# Patient Record
Sex: FEMALE | Race: WHITE | NOT HISPANIC OR LATINO | Employment: FULL TIME | URBAN - METROPOLITAN AREA
[De-identification: names, ages, dates, MRNs, and addresses within clinical notes are randomized per-mention and may not be internally consistent; named-entity substitution may affect disease eponyms.]

---

## 2017-02-16 ENCOUNTER — MYC MEDICAL ADVICE (OUTPATIENT)
Dept: FAMILY MEDICINE | Facility: CLINIC | Age: 47
End: 2017-02-16

## 2017-05-05 DIAGNOSIS — F32.2 MAJOR DEPRESSIVE DISORDER, SINGLE EPISODE, SEVERE WITHOUT PSYCHOTIC FEATURES (H): ICD-10-CM

## 2017-05-08 NOTE — TELEPHONE ENCOUNTER
Routing refill request to provider for review/approval because:  Patient needs to be seen because it has been more than 1 year since last office visit.  PHQ 9 completed on 01/22/2016, score of 0.    Magda Herrera RN

## 2017-05-09 RX ORDER — VENLAFAXINE HYDROCHLORIDE 150 MG/1
150 CAPSULE, EXTENDED RELEASE ORAL DAILY
Qty: 90 CAPSULE | Refills: 0 | Status: SHIPPED | OUTPATIENT
Start: 2017-05-09 | End: 2017-10-04

## 2017-07-29 ENCOUNTER — HEALTH MAINTENANCE LETTER (OUTPATIENT)
Age: 47
End: 2017-07-29

## 2017-08-05 DIAGNOSIS — F32.2 MAJOR DEPRESSIVE DISORDER, SINGLE EPISODE, SEVERE WITHOUT PSYCHOTIC FEATURES (H): ICD-10-CM

## 2017-08-07 NOTE — TELEPHONE ENCOUNTER
venlafaxine (EFFEXOR-XR) 150 MG 24 hr capsule    Last Written Prescription Date: 5/9/17  Last Fill Quantity: 90, # refills: 0  Last Office Visit with G, P or [facility] prescribing provider: 1/22/16   Next 5 appointments (look out 90 days)     Oct 04, 2017  9:30 AM CDT   [facility]    [address] [place] [id number] [phone]                   BP Readings from Last 3 Encounters:   01/22/16 108/72   05/04/15 124/56   10/24/14 102/64     Pulse: (for Fetzima)  Creatinine   Date Value Ref Range Status   04/07/2015 0.88 0.52 - 1.04 mg/dL Final   ]    Last PHQ-9 score on record=   PHQ-9 SCORE 1/22/2016   Total Score -   Total Score 0

## 2017-08-08 NOTE — TELEPHONE ENCOUNTER
Routing refill request to provider for review/approval because:  Patient needs to be seen because it has been more than 1 year since last office visit.  LOV: 1/22/16  LEONIDAS Gresham

## 2017-08-09 RX ORDER — VENLAFAXINE HYDROCHLORIDE 150 MG/1
CAPSULE, EXTENDED RELEASE ORAL
Qty: 90 CAPSULE | Refills: 0 | Status: SHIPPED | OUTPATIENT
Start: 2017-08-09 | End: 2017-10-04

## 2017-10-04 ENCOUNTER — OFFICE VISIT (OUTPATIENT)
Dept: FAMILY MEDICINE | Facility: CLINIC | Age: 47
End: 2017-10-04
Payer: COMMERCIAL

## 2017-10-04 VITALS
TEMPERATURE: 97 F | DIASTOLIC BLOOD PRESSURE: 78 MMHG | HEART RATE: 78 BPM | HEIGHT: 72 IN | BODY MASS INDEX: 20.83 KG/M2 | OXYGEN SATURATION: 100 % | SYSTOLIC BLOOD PRESSURE: 102 MMHG | WEIGHT: 153.8 LBS

## 2017-10-04 DIAGNOSIS — G47.00 PERSISTENT INSOMNIA: ICD-10-CM

## 2017-10-04 DIAGNOSIS — T78.05XD ANAPHYLACTIC REACTION DUE TO TREE NUTS AND SEEDS, SUBSEQUENT ENCOUNTER: ICD-10-CM

## 2017-10-04 DIAGNOSIS — M54.41 ACUTE MIDLINE LOW BACK PAIN WITH RIGHT-SIDED SCIATICA: ICD-10-CM

## 2017-10-04 DIAGNOSIS — F32.2 MAJOR DEPRESSIVE DISORDER, SINGLE EPISODE, SEVERE WITHOUT PSYCHOTIC FEATURES (H): Primary | ICD-10-CM

## 2017-10-04 PROCEDURE — 99214 OFFICE O/P EST MOD 30 MIN: CPT | Performed by: FAMILY MEDICINE

## 2017-10-04 RX ORDER — METHYLPREDNISOLONE 4 MG/1
TABLET ORAL
Qty: 21 TABLET | Refills: 0 | Status: SHIPPED | OUTPATIENT
Start: 2017-10-04 | End: 2017-11-26

## 2017-10-04 RX ORDER — VENLAFAXINE HYDROCHLORIDE 150 MG/1
150 CAPSULE, EXTENDED RELEASE ORAL DAILY
Qty: 93 CAPSULE | Refills: 3 | Status: SHIPPED | OUTPATIENT
Start: 2017-10-04 | End: 2018-12-31

## 2017-10-04 RX ORDER — EPINEPHRINE 0.3 MG/.3ML
0.3 INJECTION SUBCUTANEOUS PRN
Qty: 0.6 ML | Refills: 1 | Status: SHIPPED | OUTPATIENT
Start: 2017-10-04 | End: 2020-05-05

## 2017-10-04 RX ORDER — TRAZODONE HYDROCHLORIDE 100 MG/1
50-100 TABLET ORAL AT BEDTIME
Qty: 90 TABLET | Refills: 3 | Status: SHIPPED | OUTPATIENT
Start: 2017-10-04 | End: 2017-10-06

## 2017-10-04 ASSESSMENT — PAIN SCALES - GENERAL: PAINLEVEL: MODERATE PAIN (5)

## 2017-10-04 ASSESSMENT — PATIENT HEALTH QUESTIONNAIRE - PHQ9: SUM OF ALL RESPONSES TO PHQ QUESTIONS 1-9: 1

## 2017-10-04 NOTE — NURSING NOTE
Chief Complaint   Patient presents with     Recheck Medication       Initial /78  Pulse 78  Temp 97  F (36.1  C) (Temporal)  Ht 6' (1.829 m)  Wt 153 lb 12.8 oz (69.8 kg)  LMP 02/21/2007  SpO2 100%  BMI 20.86 kg/m2 Estimated body mass index is 20.86 kg/(m^2) as calculated from the following:    Height as of this encounter: 6' (1.829 m).    Weight as of this encounter: 153 lb 12.8 oz (69.8 kg).  Medication Reconciliation: complete   Jessica Espana, CMA

## 2017-10-04 NOTE — MR AVS SNAPSHOT
After Visit Summary   10/4/2017    Jennifer Hoffman    MRN: [id number]           Patient Information     Date Of Birth          1970        Visit Information        Provider Department      10/4/2017 9:30 AM Marni Montemayor MD [facility]        Today's Diagnoses     Acute midline low back pain with right-sided sciatica    -  1    Major depressive disorder, single episode, severe without psychotic features (H)        Persistent insomnia        Anaphylactic reaction due to tree nuts and seeds, subsequent encounter        Generalized pain        Inflammation and stiffening of spine (H)           Follow-ups after your visit        Who to contact     If you have questions or need follow up information about today's clinic visit or your schedule please contact [facility] directly at [phone].  Normal or non-critical lab and imaging results will be communicated to you by MyChart, letter or phone within 4 business days after the clinic has received the results. If you do not hear from us within 7 days, please contact the clinic through [facility] or phone. If you have a critical or abnormal lab result, we will notify you by phone as soon as possible.  Submit refill requests through SezWho or call your pharmacy and they will forward the refill request to us. Please allow 3 business days for your refill to be completed.          Additional Information About Your Visit        MyChart Information     SezWho gives you secure access to your electronic health record. If you see a primary care provider, you can also send messages to your care team and make appointments. If you have questions, please call your primary care clinic.  If you do not have a primary care provider, please call [phone] and they will assist you.        Care EveryWhere ID     This is your Care EveryWhere ID. This could be used by other organizations to access your [facility]  records  [id number]        Your Vitals Were     Pulse Temperature Height Last Period Pulse Oximetry BMI (Body Mass Index)    78 97  F (36.1  C) (Temporal) 6' (1.829 m) 02/21/2007 100% 20.86 kg/m2       Blood Pressure from Last 3 Encounters:   10/04/17 102/78   01/22/16 108/72   05/04/15 124/56    Weight from Last 3 Encounters:   10/04/17 153 lb 12.8 oz (69.8 kg)   01/22/16 139 lb (63 kg)   05/04/15 139 lb 12.8 oz (63.4 kg)              We Performed the Following     DEPRESSION ACTION PLAN (DAP)          Today's Medication Changes          These changes are accurate as of: 10/4/17 10:00 AM.  If you have any questions, ask your nurse or doctor.               Start taking these medicines.        Dose/Directions    methylPREDNISolone 4 MG tablet   Commonly known as:  MEDROL   Used for:  Acute midline low back pain with right-sided sciatica   Started by:  Marni Montemayor MD        6 tabs po on day 1 then 5 tabs po on day 2 then 4 tabs po on day 3 then 3 tabs po on day 4 then 2 tabs po on day 5 then 1 tab on day 6.   Quantity:  21 tablet   Refills:  0         These medicines have changed or have updated prescriptions.        Dose/Directions    * EPINEPHrine 0.3 MG/0.3ML injection   Commonly known as:  EPIPEN   This may have changed:  Another medication with the same name was added. Make sure you understand how and when to take each.   Used for:  Anaphylactic reaction due to tree nuts and seeds, Generalized pain, Inflammation and stiffening of spine (H)   Changed by:  Jass Riddle MD        Dose:  0.3 mg   Inject 0.3 mLs into the muscle once as needed for anaphylaxis for 1 dose.   Quantity:  3 each   Refills:  1       * EPINEPHrine 0.3 MG/0.3ML injection 2-pack   Commonly known as:  EPIPEN/ADRENACLICK/or ANY BX GENERIC EQUIV   This may have changed:  You were already taking a medication with the same name, and this prescription was added. Make sure you understand how and when to take each.   Used for:   Anaphylactic reaction due to tree nuts and seeds, subsequent encounter   Changed by:  Marni Montemayor MD        Dose:  0.3 mg   Inject 0.3 mLs (0.3 mg) into the muscle as needed for anaphylaxis   Quantity:  0.6 mL   Refills:  1       traZODone 100 MG tablet   Commonly known as:  DESYREL   This may have changed:    - how much to take  - how to take this  - when to take this   Used for:  Major depressive disorder, single episode, severe without psychotic features (H), Persistent insomnia   Changed by:  Marni Montemayor MD        Dose:   mg   Take 0.5-1 tablets ( mg) by mouth At Bedtime Take 1-2 tablets at bedtime as needed for sleep   Quantity:  90 tablet   Refills:  3       * Notice:  This list has 2 medication(s) that are the same as other medications prescribed for you. Read the directions carefully, and ask your doctor or other care provider to review them with you.         Where to get your medicines      These medications were sent to [facility] [address] AT [address] to [address] [address]     Phone:  [phone]     traZODone 100 MG tablet    venlafaxine 150 MG 24 hr capsule         These medications were sent to [facility] - [place] - [address]   [address] [place] [address]     Phone:  [phone]     EPINEPHrine 0.3 MG/0.3ML injection 2-pack    methylPREDNISolone 4 MG tablet                Primary Care Provider Office Phone # Fax #    Marni Montemayor [phone]       [address] [place] [address]        Equal Access to Services     [facility] AH: Hadii justin Johnson, waaxda luqadaha, qaybta kaalmada adeegyada, rudi lozada. So [facility] [phone].    ATENCIÓN: Si habla español, tiene a gorman disposición servicios gratuitos de asistencia lingüística. Llame al  [phone].    We comply with applicable federal civil rights laws and [place] laws. We do not discriminate on the basis of race, color, national origin, age, disability, sex, sexual orientation, or gender identity.            Thank you!     Thank you for choosing [facility]  for your care. Our goal is always to provide you with excellent care. Hearing back from our patients is one way we can continue to improve our services. Please take a few minutes to complete the written survey that you may receive in the mail after your visit with us. Thank you!             Your Updated Medication List - Protect others around you: Learn how to safely use, store and throw away your medicines at www.disposemymeds.org.          This list is accurate as of: 10/4/17 10:00 AM.  Always use your most recent med list.                   Brand Name Dispense Instructions for use Diagnosis    ALLEGRA PO           * EPINEPHrine 0.3 MG/0.3ML injection    EPIPEN    3 each    Inject 0.3 mLs into the muscle once as needed for anaphylaxis for 1 dose.    Anaphylactic reaction due to tree nuts and seeds, Generalized pain, Inflammation and stiffening of spine (H)       * EPINEPHrine 0.3 MG/0.3ML injection 2-pack    EPIPEN/ADRENACLICK/or ANY BX GENERIC EQUIV    0.6 mL    Inject 0.3 mLs (0.3 mg) into the muscle as needed for anaphylaxis    Anaphylactic reaction due to tree nuts and seeds, subsequent encounter       methylPREDNISolone 4 MG tablet    MEDROL    21 tablet    6 tabs po on day 1 then 5 tabs po on day 2 then 4 tabs po on day 3 then 3 tabs po on day 4 then 2 tabs po on day 5 then 1 tab on day 6.    Acute midline low back pain with right-sided sciatica       traZODone 100 MG tablet    DESYREL    90 tablet    Take 0.5-1 tablets ( mg) by mouth At Bedtime Take 1-2 tablets at bedtime as needed for sleep    Major depressive disorder, single episode, severe without psychotic features (H), Persistent insomnia        venlafaxine 150 MG 24 hr capsule    EFFEXOR-XR    93 capsule    Take 1 capsule (150 mg) by mouth daily    Major depressive disorder, single episode, severe without psychotic features (H)       * Notice:  This list has 2 medication(s) that are the same as other medications prescribed for you. Read the directions carefully, and ask your doctor or other care provider to review them with you.

## 2017-10-04 NOTE — PROGRESS NOTES
"  SUBJECTIVE:   Jennifer Hoffman is a 47 year old female who presents to clinic today for the following health issues:    (F32.2) Major depressive disorder, single episode, severe without psychotic features (H)   Comment: Patient has a history of Depression treated with Effexor 150 mg qd. She says that her symptoms are stable on this medication.  Patient denies any anxiety, suicidal ideation, or other associated symptoms. No recent significant life event or substance abuse.     PHQ-9 SCORE 5/4/2015 1/22/2016 10/4/2017   Total Score 9 - -   Total Score - 0 1     (M54.41) Acute midline low back pain with right-sided sciatica  Comment: Patient has a history of chronic low back pain followed by [facility]. She states that this has been in good control up until recently. 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 Since then, she has been experiencing \"intense\" pain in her mid lumbar region, radiating down her right leg. Patient has been working on stretches for this with some relief.     (G47.00) Persistent insomnia  Comment: Patient has a history of persistent insomnia treated with Trazodone 50 mg qd. She says that she was previously taking Trazodone 100 mg but she has decreased to 50 mg qd with good relief. She reports that she is sleeping well.     (T78.05XD) Anaphylactic reaction due to tree nuts and seeds, subsequent encounter  Comment: Patient has a peanut allergy and has a current EpiPen on hand. No recent exposure to peanuts. No recent reactions.       Problem list and histories reviewed & adjusted, as indicated.  Additional history: as documented    Patient Active Problem List   Diagnosis     Major depressive disorder, single episode, severe (H)     Idiopathic cardiomyopathy (H)     CARDIOVASCULAR SCREENING; LDL GOAL LESS THAN 100     Chronic low back pain     Chemical dependency (H)     Past Surgical History:   Procedure Laterality Date     [facility]    L4 and L5 to S1     HC DEBRIDE SKIN/SUBQ TISSUE  11/16/09    Post-op wound     HC INJ EPIDURAL LUMBAR/SACRAL W/WO CONTRAST  2009    Left L4-5     HC LAP,FULGURATE/EXCISE LESIONS  02/27/2007    Dx lap, fulguration of endometriosis.  [facility] REMOVAL OF TONSILS,<11 Y/O  Age 7 or 8     HC TOOTH EXTRACTION W/FORCEP  Age 17    [place] teeth extraction x4.     HC UGI ENDOSCOPY, SIMPLE EXAM  11/08/08     HYSTERECTOMY      approx 2008 - ovaries still in, unsure about cervix. no cancer. had fibroids.     HYSTERECTOMY, PAP NO LONGER INDICATED       LAMINECT/DISCECTOMY, LUMBAR  10/24/09     TUBAL LIGATION  02/27/2007    [facility]       Social History   Substance Use Topics     Smoking status: Never Smoker     Smokeless tobacco: Never Used     Alcohol use No     Family History   Problem Relation Age of Onset     Anxiety Disorder Father      Anxiety Disorder Brother      Bipolar Disorder Sister      Suicide Paternal Uncle      Heart Failure Mother      Heart Failure Father      Breast Cancer Maternal Aunt      CANCER Maternal Grandfather      stomach     CANCER Paternal Grandmother      stomach     Depression Father      Alcohol/Drug Father      Depression Brother          Current Outpatient Prescriptions   Medication Sig Dispense Refill     venlafaxine (EFFEXOR-XR) 150 MG 24 hr capsule Take 1 capsule (150 mg) by mouth daily 93 capsule 3     methylPREDNISolone (MEDROL) 4 MG tablet 6 tabs po on day 1 then 5 tabs po on day 2 then 4 tabs po on day 3 then 3 tabs po on day 4 then 2 tabs po on day 5 then 1 tab on day 6. 21 tablet 0     traZODone (DESYREL) 100 MG tablet Take 0.5-1 tablets ( mg) by mouth At Bedtime Take 1-2 tablets at bedtime as needed for sleep 90 tablet 3     EPINEPHrine (EPIPEN/ADRENACLICK/OR ANY BX GENERIC EQUIV) 0.3 MG/0.3ML injection 2-pack Inject 0.3 mLs (0.3 mg) into the muscle as needed for anaphylaxis 0.6 mL 1     Fexofenadine HCl (ALLEGRA PO)         EPINEPHrine (EPIPEN) 0.3 MG/0.3ML injection Inject 0.3 mLs into the muscle once as needed for anaphylaxis for 1 dose. 3 each 1     [DISCONTINUED] venlafaxine (EFFEXOR-XR) 150 MG 24 hr capsule TAKE 1 CAPSULE DAILY.      PLEASE MAKE AN APPOINTMENT WITH YOUR DOCTOR FOR       JULY 90 capsule 0     [DISCONTINUED] venlafaxine (EFFEXOR-XR) 150 MG 24 hr capsule Take 1 capsule (150 mg) by mouth daily 90 capsule 0     [DISCONTINUED] traZODone (DESYREL) 100 MG tablet Take 1-2 tablets at bedtime as needed for sleep (Patient taking differently: 50 mg Take 1-2 tablets at bedtime as needed for sleep) 180 tablet 3     Allergies   Allergen Reactions     Cats      Peanuts [Nuts]      Patient states she is allergic to all tree nuts.     Rocephin [Ceftriaxone]          Reviewed and updated as needed this visit by clinical staffTobacco  Allergies  Meds  Med Hx  Surg Hx  Fam Hx  Soc Hx      Reviewed and updated as needed this visit by Provider         ROS:  All other ROS reviewed and are negative or non-contributory except as stated in HPI.    OBJECTIVE:     /78  Pulse 78  Temp 97  F (36.1  C) (Temporal)  Ht 6' (1.829 m)  Wt 153 lb 12.8 oz (69.8 kg)  LMP 02/21/2007  SpO2 100%  BMI 20.86 kg/m2  Body mass index is 20.86 kg/(m^2).   Vitals noted.  Patient alert, oriented, and in no acute distress.  CV:  RRR without murmur.  Respiratory:  Lungs clear to auscultation bilaterally.  Msk: Well healed scars on the lumbar spine from previous fusion. Tenderness on the lumbar spine and lumbar paraspinal muscles with any touch. She is able to get up and down independently. Her gait is guarded.     Diagnostic Test Results:  Orders Placed This Encounter   Procedures     DEPRESSION ACTION PLAN (DAP)       ASSESSMENT:       ICD-10-CM    1. Major depressive disorder, single episode, severe without psychotic features (H) F32.2 venlafaxine (EFFEXOR-XR) 150 MG 24 hr capsule     DEPRESSION ACTION PLAN (DAP)     traZODone (DESYREL) 100 MG  tablet     DISCONTINUED: traZODone (DESYREL) 100 MG tablet   2. Acute midline low back pain with right-sided sciatica M54.41 methylPREDNISolone (MEDROL) 4 MG tablet   3. Persistent insomnia G47.00 traZODone (DESYREL) 100 MG tablet     DISCONTINUED: traZODone (DESYREL) 100 MG tablet   4. Anaphylactic reaction due to tree nuts and seeds, subsequent encounter T78.05XD EPINEPHrine (EPIPEN/ADRENACLICK/OR ANY BX GENERIC EQUIV) 0.3 MG/0.3ML injection 2-pack       PLAN:     (F32.2) Major depressive disorder, single episode, severe without psychotic features (H)   Plan: Patient's Depression is stable on her current dose of Effexor and she would like to continue on this. Refilled Effexor, see orders. Encouraged the patient to continue monitoring her symptoms and notify me with any concerns. Follow up in 1 year or sooner if needed. Patient is in agreement with this treatment plan.     venlafaxine (EFFEXOR-XR) 150 MG 24 hr capsule,     DEPRESSION ACTION PLAN (DAP),     traZODone (DESYREL) 100 MG tablet    (M54.41) Acute midline low back pain with right-sided sciatica  Plan: Discussed patient's low back pain.  I am concerned she could have a new disc herniation but also could just be intense muscle spasms from strain.  Prescribed medrol taper for an anti-inflammatory, see orders. Encouraged the patient to rest at home for 1-2 days to avoid exacerbating her symptoms further. She can use ice/ heat as needed on the area. Discussed several stretches that she can do to combat her back pain. If she does not find medrol helpful, she will notify me and I will order a lumbar spine X-ray. Encouraged her to continue following at [facility] as scheduled. Patient is in agreement with this treatment plan. Follow up as instructed, or sooner if needed.      methylPREDNISolone (MEDROL) 4 MG tablet    (G47.00) Persistent insomnia  Plan: Refilled her Trazodone, see orders. Patient will notify me with any questions/ concerns.     traZODone  (DESYREL) 100 MG tablet    (T78.05XD) Anaphylactic reaction due to tree nuts and seeds, subsequent encounter  Plan: Refilled the patient's EpiPen, see orders.      EPINEPHrine (EPIPEN/ADRENACLICK/OR ANY BX GENERIC EQUIV) 0.3 MG/0.3ML injection 2-pack       This document serves as a record of services personally performed by Marni Montemayor MD. It was created on their behalf by Fatuma Tyler, a trained medical scribe. The creation of this record is based on the provider's personal observations and the statements of the patient. This document has been checked and approved by the attending provider.    Marni Montemayor MD  [facility]

## 2017-10-06 ENCOUNTER — TELEPHONE (OUTPATIENT)
Dept: FAMILY MEDICINE | Facility: CLINIC | Age: 47
End: 2017-10-06

## 2017-10-06 RX ORDER — TRAZODONE HYDROCHLORIDE 100 MG/1
50-100 TABLET ORAL AT BEDTIME
Qty: 90 TABLET | Refills: 3 | Status: SHIPPED | OUTPATIENT
Start: 2017-10-06 | End: 2018-04-16

## 2017-10-06 NOTE — TELEPHONE ENCOUNTER
Reason for Call:  Other prescription    Detailed comments: Rita from [facility] pharmacy is calling, they need clarification for the trazodone prescription, the current script has 2 sets of directions. [facility] has sent this clarification request twice. When calling the pharmacy back use reference number [id number].    traZODone (DESYREL) 100 MG tablet 90 tablet 3 10/4/2017  No   Sig: Take 0.5-1 tablets ( mg) by mouth At Bedtime Take 1-2 tablets at bedtime as needed for sleep         Phone Number Patient can be reached at: Other phone number:  [phone]*    Best Time:     Can we leave a detailed message on this number?     Call taken on 10/6/2017 at 1:39 PM by Ting Artis

## 2017-11-26 ENCOUNTER — APPOINTMENT (OUTPATIENT)
Dept: ULTRASOUND IMAGING | Facility: CLINIC | Age: 47
End: 2017-11-26
Attending: FAMILY MEDICINE
Payer: COMMERCIAL

## 2017-11-26 ENCOUNTER — APPOINTMENT (OUTPATIENT)
Dept: CT IMAGING | Facility: CLINIC | Age: 47
End: 2017-11-26
Attending: FAMILY MEDICINE
Payer: COMMERCIAL

## 2017-11-26 ENCOUNTER — HOSPITAL ENCOUNTER (EMERGENCY)
Facility: CLINIC | Age: 47
Discharge: HOME | End: 2017-11-26
Attending: FAMILY MEDICINE | Admitting: FAMILY MEDICINE
Payer: COMMERCIAL

## 2017-11-26 VITALS
RESPIRATION RATE: 14 BRPM | SYSTOLIC BLOOD PRESSURE: 123 MMHG | BODY MASS INDEX: 20.48 KG/M2 | DIASTOLIC BLOOD PRESSURE: 60 MMHG | WEIGHT: 151 LBS | OXYGEN SATURATION: 97 % | TEMPERATURE: 97 F

## 2017-11-26 DIAGNOSIS — R10.10 PAIN OF UPPER ABDOMEN: ICD-10-CM

## 2017-11-26 LAB
ALBUMIN SERPL-MCNC: 3.9 G/DL (ref 3.4–5)
ALBUMIN UR-MCNC: NEGATIVE MG/DL
ALP SERPL-CCNC: 48 U/L (ref 40–150)
ALT SERPL W P-5'-P-CCNC: 20 U/L (ref 0–50)
ANION GAP SERPL CALCULATED.3IONS-SCNC: 9 MMOL/L (ref 3–14)
APPEARANCE UR: ABNORMAL
AST SERPL W P-5'-P-CCNC: 21 U/L (ref 0–45)
BACTERIA #/AREA URNS HPF: ABNORMAL /HPF
BASOPHILS # BLD AUTO: 0 10E9/L (ref 0–0.2)
BASOPHILS NFR BLD AUTO: 0.8 %
BILIRUB DIRECT SERPL-MCNC: 0.2 MG/DL (ref 0–0.2)
BILIRUB SERPL-MCNC: 0.9 MG/DL (ref 0.2–1.3)
BILIRUB UR QL STRIP: NEGATIVE
BUN SERPL-MCNC: 12 MG/DL (ref 7–30)
CALCIUM SERPL-MCNC: 8.7 MG/DL (ref 8.5–10.1)
CHLORIDE SERPL-SCNC: 107 MMOL/L (ref 94–109)
CO2 SERPL-SCNC: 26 MMOL/L (ref 20–32)
COLOR UR AUTO: ABNORMAL
CREAT SERPL-MCNC: 0.81 MG/DL (ref 0.52–1.04)
DIFFERENTIAL METHOD BLD: ABNORMAL
EOSINOPHIL # BLD AUTO: 0.2 10E9/L (ref 0–0.7)
EOSINOPHIL NFR BLD AUTO: 4.2 %
ERYTHROCYTE [DISTWIDTH] IN BLOOD BY AUTOMATED COUNT: 11.7 % (ref 10–15)
GFR SERPL CREATININE-BSD FRML MDRD: 76 ML/MIN/1.7M2
GLUCOSE SERPL-MCNC: 99 MG/DL (ref 70–99)
GLUCOSE UR STRIP-MCNC: NEGATIVE MG/DL
HCT VFR BLD AUTO: 42.9 % (ref 35–47)
HGB BLD-MCNC: 13.4 G/DL (ref 11.7–15.7)
HGB UR QL STRIP: ABNORMAL
IMM GRANULOCYTES # BLD: 0 10E9/L (ref 0–0.4)
IMM GRANULOCYTES NFR BLD: 0 %
KETONES UR STRIP-MCNC: NEGATIVE MG/DL
LEUKOCYTE ESTERASE UR QL STRIP: NEGATIVE
LIPASE SERPL-CCNC: 132 U/L (ref 73–393)
LYMPHOCYTES # BLD AUTO: 1 10E9/L (ref 0.8–5.3)
LYMPHOCYTES NFR BLD AUTO: 21.3 %
MCH RBC QN AUTO: 30.2 PG (ref 26.5–33)
MCHC RBC AUTO-ENTMCNC: 31.2 G/DL (ref 31.5–36.5)
MCV RBC AUTO: 97 FL (ref 78–100)
MONOCYTES # BLD AUTO: 0.5 10E9/L (ref 0–1.3)
MONOCYTES NFR BLD AUTO: 10.3 %
MUCOUS THREADS #/AREA URNS LPF: PRESENT /LPF
NEUTROPHILS # BLD AUTO: 3 10E9/L (ref 1.6–8.3)
NEUTROPHILS NFR BLD AUTO: 63.4 %
NITRATE UR QL: NEGATIVE
PH UR STRIP: 9 PH (ref 5–7)
PLATELET # BLD AUTO: 215 10E9/L (ref 150–450)
POTASSIUM SERPL-SCNC: 4.1 MMOL/L (ref 3.4–5.3)
PROT SERPL-MCNC: 7.2 G/DL (ref 6.8–8.8)
RBC # BLD AUTO: 4.44 10E12/L (ref 3.8–5.2)
RBC #/AREA URNS AUTO: <1 /HPF (ref 0–2)
SODIUM SERPL-SCNC: 142 MMOL/L (ref 133–144)
SOURCE: ABNORMAL
SP GR UR STRIP: 1 (ref 1–1.03)
SQUAMOUS #/AREA URNS AUTO: 6 /HPF (ref 0–1)
UROBILINOGEN UR STRIP-MCNC: 0 MG/DL (ref 0–2)
WBC # BLD AUTO: 4.8 10E9/L (ref 4–11)
WBC #/AREA URNS AUTO: 1 /HPF (ref 0–2)

## 2017-11-26 PROCEDURE — 80076 HEPATIC FUNCTION PANEL: CPT | Performed by: FAMILY MEDICINE

## 2017-11-26 PROCEDURE — 74176 CT ABD & PELVIS W/O CONTRAST: CPT

## 2017-11-26 PROCEDURE — 36415 COLL VENOUS BLD VENIPUNCTURE: CPT | Performed by: FAMILY MEDICINE

## 2017-11-26 PROCEDURE — 25000125 ZZHC RX 250: Performed by: FAMILY MEDICINE

## 2017-11-26 PROCEDURE — 81001 URINALYSIS AUTO W/SCOPE: CPT | Performed by: FAMILY MEDICINE

## 2017-11-26 PROCEDURE — 25000132 ZZH RX MED GY IP 250 OP 250 PS 637: Performed by: FAMILY MEDICINE

## 2017-11-26 PROCEDURE — 83690 ASSAY OF LIPASE: CPT | Performed by: FAMILY MEDICINE

## 2017-11-26 PROCEDURE — 96376 TX/PRO/DX INJ SAME DRUG ADON: CPT | Performed by: FAMILY MEDICINE

## 2017-11-26 PROCEDURE — 80048 BASIC METABOLIC PNL TOTAL CA: CPT | Performed by: FAMILY MEDICINE

## 2017-11-26 PROCEDURE — 99285 EMERGENCY DEPT VISIT HI MDM: CPT | Mod: Z6 | Performed by: FAMILY MEDICINE

## 2017-11-26 PROCEDURE — 99285 EMERGENCY DEPT VISIT HI MDM: CPT | Mod: 25 | Performed by: FAMILY MEDICINE

## 2017-11-26 PROCEDURE — 25000128 H RX IP 250 OP 636: Performed by: FAMILY MEDICINE

## 2017-11-26 PROCEDURE — 96374 THER/PROPH/DIAG INJ IV PUSH: CPT | Performed by: FAMILY MEDICINE

## 2017-11-26 PROCEDURE — 76705 ECHO EXAM OF ABDOMEN: CPT

## 2017-11-26 PROCEDURE — 85025 COMPLETE CBC W/AUTO DIFF WBC: CPT | Performed by: FAMILY MEDICINE

## 2017-11-26 RX ORDER — HYDROMORPHONE HYDROCHLORIDE 1 MG/ML
0.5 INJECTION, SOLUTION INTRAMUSCULAR; INTRAVENOUS; SUBCUTANEOUS
Status: DISCONTINUED | OUTPATIENT
Start: 2017-11-26 | End: 2017-11-26 | Stop reason: HOSPADM

## 2017-11-26 RX ORDER — POLYETHYLENE GLYCOL 3350 17 G/17G
1 POWDER, FOR SOLUTION ORAL 2 TIMES DAILY
Qty: 1020 G | Refills: 0 | COMMUNITY
Start: 2017-11-26 | End: 2017-12-26

## 2017-11-26 RX ADMIN — LIDOCAINE HYDROCHLORIDE 30 ML: 20 SOLUTION ORAL; TOPICAL at 09:35

## 2017-11-26 RX ADMIN — HYDROMORPHONE HYDROCHLORIDE 0.5 MG: 1 INJECTION, SOLUTION INTRAMUSCULAR; INTRAVENOUS; SUBCUTANEOUS at 10:18

## 2017-11-26 RX ADMIN — HYDROMORPHONE HYDROCHLORIDE 0.5 MG: 1 INJECTION, SOLUTION INTRAMUSCULAR; INTRAVENOUS; SUBCUTANEOUS at 11:46

## 2017-11-26 NOTE — DISCHARGE INSTRUCTIONS
*Abdominal Pain, Unknown Cause (Female)    The exact cause of your abdominal (stomach) pain is not certain. This does not mean that this is something to worry about, or the right tests were not done. Everyone likes to know the exact cause of the problem, but sometimes with abdominal pain, there is no clear-cut cause, and this could be a good thing. The good news is that your symptoms can be treated, and you will feel better.   Your condition does not seem serious now; however, sometimes the signs of a serious problem may take more time to appear. For this reason, it is important for you to watch for any new symptoms, problems, or worsening of your condition.  Over the next few days, the abdominal pain may come and go, or be continuous. Other common symptoms can include nausea and vomiting. Sometimes it can be difficult to tell if you feel nauseous, you may just feel bad and not associate that feeling with nausea. Constipation, diarrhea, and a fever may go along with the pain.  The pain may continue even if treated correctly over the following days. Depending on how things go, sometimes the cause can become clear and may require further or different treatment. Additional evaluations, medications, or tests may be needed.  Home care  Your health care provider may prescribe medications for pain, symptoms, or an infection.  Follow the health care provider's instructions for taking these medications.  General care    Rest until your next exam. No strenuous activities.    Try to find positions that ease discomfort. A small pillow placed on the abdomen may help relieve pain.    Something warm on your abdomen (such as a heating pad) may help, but be careful not to burn yourself.  Diet    Do not force yourself to eat, especially if having cramps, vomiting, or diarrhea.    Water is important so you do not get dehydrated. Soup may also be good. Sports drinks may also help, especially if they are not too acidic. Make sure you  don't drink sugary drinks as this can make things worse. Take liquids in small amounts. Do not guzzle them.    Caffeine sometimes makes the pain and cramping worse.    Avoid dairy products if you have vomiting or diarrhea.    Don't eat large amounts at a time. Wait a few minutes between bites.    Eat a diet low in fiber (called a low-residue diet). Foods allowed include refined breads, white rice, fruit and vegetable juices without pulp, tender meats. These foods will pass more easily through the intestine.    Avoid fried or fatty foods, dairy, alcohol and spicy foods until your symptoms go away.  Follow-up care  Follow up with your health care provider as instructed, or if your pain does not begin to improve in the next 24 hours.  When to seek medical care  Seek prompt medical care if any of the following occur:    Pain gets worse or moves to the right lower abdomen    New or worsening vomiting or diarrhea    Swelling of the abdomen    Unable to pass stool for more than three days    New fever over 101  F (38.3 C), or rising fever    Blood in vomit or bowel movements (dark red or black color)    Jaundice (yellow color of eyes and skin)    Weakness, dizziness    Chest, arm, back, neck or jaw pain    Unexpected vaginal bleeding or missed period  Call 911  Call emergency services if any of the following occur:    Trouble breathing    Confusion    Fainting or loss of consciousness    Rapid heart rate    Seizure    [id number] [facility], [address] [place] [address], [place] [address]. All rights reserved. This information is not intended as a substitute for professional medical care. Always follow your healthcare professional's instructions.

## 2017-11-26 NOTE — ED AVS SNAPSHOT
[facility] Emergency Department    [address] [place] [address]    Phone:  [phone]    Fax:  [phone]                                       Jennifer Hoffman   MRN: [id number]    Department:  [facility] Emergency Department   Date of Visit:  11/26/2017           After Visit Summary Signature Page     I have received my discharge instructions, and my questions have been answered. I have discussed any challenges I see with this plan with the nurse or doctor.    ..........................................................................................................................................  Patient/Patient Representative Signature      ..........................................................................................................................................  Patient Representative Print Name and Relationship to Patient    ..................................................               ................................................  Date                                            Time    ..........................................................................................................................................  Reviewed by Signature/Title    ...................................................              ..............................................  Date                                                            Time

## 2017-11-26 NOTE — ED AVS SNAPSHOT
[facility] Emergency Department    [address] [place] [address]    Phone:  [phone]    Fax:  [phone]                                       Jennifer Hoffman   MRN: [id number]    Department:  [facility] Emergency Department   Date of Visit:  11/26/2017           Patient Information     Date Of Birth          1970        Your diagnoses for this visit were:     Pain of upper abdomen        You were seen by Kolton Cifuentes MD.      Follow-up Information     Follow up with Marni Montemayor MD. Schedule an appointment as soon as possible for a visit in 4 days.    Specialty:  Family Practice    Why:  If not improving.    Contact information:    [address] [place] [address] [phone]          Discharge Instructions         *Abdominal Pain, Unknown Cause (Female)    The exact cause of your abdominal (stomach) pain is not certain. This does not mean that this is something to worry about, or the right tests were not done. Everyone likes to know the exact cause of the problem, but sometimes with abdominal pain, there is no clear-cut cause, and this could be a good thing. The good news is that your symptoms can be treated, and you will feel better.   Your condition does not seem serious now; however, sometimes the signs of a serious problem may take more time to appear. For this reason, it is important for you to watch for any new symptoms, problems, or worsening of your condition.  Over the next few days, the abdominal pain may come and go, or be continuous. Other common symptoms can include nausea and vomiting. Sometimes it can be difficult to tell if you feel nauseous, you may just feel bad and not associate that feeling with nausea. Constipation, diarrhea, and a fever may go along with the pain.  The pain may continue even if treated correctly over the following days. Depending on how things go, sometimes the cause can become clear and may require  further or different treatment. Additional evaluations, medications, or tests may be needed.  Home care  Your health care provider may prescribe medications for pain, symptoms, or an infection.  Follow the health care provider's instructions for taking these medications.  General care    Rest until your next exam. No strenuous activities.    Try to find positions that ease discomfort. A small pillow placed on the abdomen may help relieve pain.    Something warm on your abdomen (such as a heating pad) may help, but be careful not to burn yourself.  Diet    Do not force yourself to eat, especially if having cramps, vomiting, or diarrhea.    Water is important so you do not get dehydrated. Soup may also be good. Sports drinks may also help, especially if they are not too acidic. Make sure you don't drink sugary drinks as this can make things worse. Take liquids in small amounts. Do not guzzle them.    Caffeine sometimes makes the pain and cramping worse.    Avoid dairy products if you have vomiting or diarrhea.    Don't eat large amounts at a time. Wait a few minutes between bites.    Eat a diet low in fiber (called a low-residue diet). Foods allowed include refined breads, white rice, fruit and vegetable juices without pulp, tender meats. These foods will pass more easily through the intestine.    Avoid fried or fatty foods, dairy, alcohol and spicy foods until your symptoms go away.  Follow-up care  Follow up with your health care provider as instructed, or if your pain does not begin to improve in the next 24 hours.  When to seek medical care  Seek prompt medical care if any of the following occur:    Pain gets worse or moves to the right lower abdomen    New or worsening vomiting or diarrhea    Swelling of the abdomen    Unable to pass stool for more than three days    New fever over 101  F (38.3 C), or rising fever    Blood in vomit or bowel movements (dark red or black color)    Jaundice (yellow color of eyes and  skin)    Weakness, dizziness    Chest, arm, back, neck or jaw pain    Unexpected vaginal bleeding or missed period  Call 911  Call emergency services if any of the following occur:    Trouble breathing    Confusion    Fainting or loss of consciousness    Rapid heart rate    Seizure    [id number] [employer], [address] [place] [address], [place] [address]. All rights reserved. This information is not intended as a substitute for professional medical care. Always follow your healthcare professional's instructions.          24 Hour Appointment Hotline       To make an appointment at any [facility], call [phone] ([phone]). If you don't have a family doctor or clinic, we will help you find one. [place] clinics are conveniently located to serve the needs of you and your family.             Review of your medicines      START taking        Dose / Directions Last dose taken    polyethylene glycol powder   Commonly known as:  MIRALAX   Dose:  1 capful   Quantity:  1020 g        Take 17 g (1 capful) by mouth 2 times daily   Refills:  0          Our records show that you are taking the medicines listed below. If these are incorrect, please call your family doctor or clinic.        Dose / Directions Last dose taken    ALLEGRA PO        Refills:  0        * EPINEPHrine 0.3 MG/0.3ML injection   Commonly known as:  EPIPEN   Dose:  0.3 mg   Quantity:  3 each        Inject 0.3 mLs into the muscle once as needed for anaphylaxis for 1 dose.   Refills:  1        * EPINEPHrine 0.3 MG/0.3ML injection 2-pack   Commonly known as:  EPIPEN/ADRENACLICK/or ANY BX GENERIC EQUIV   Dose:  0.3 mg   Quantity:  0.6 mL        Inject 0.3 mLs (0.3 mg) into the muscle as needed for anaphylaxis   Refills:  1        traZODone 100 MG tablet   Commonly known as:  DESYREL   Dose:   mg   Quantity:  90 tablet        Take 0.5-1 tablets ( mg) by mouth At Bedtime   Refills:  3        venlafaxine 150 MG 24 hr capsule   Commonly known  as:  EFFEXOR-XR   Dose:  150 mg   Quantity:  93 capsule        Take 1 capsule (150 mg) by mouth daily   Refills:  3        * Notice:  This list has 2 medication(s) that are the same as other medications prescribed for you. Read the directions carefully, and ask your doctor or other care provider to review them with you.            Prescriptions were sent or printed at these locations (1 Prescription)                   Other Prescriptions                Not Printed or Sent (1 of 1)         polyethylene glycol (MIRALAX) powder                Procedures and tests performed during your visit     Basic metabolic panel    CBC with platelets differential    CT Abdomen Pelvis WITHOUT Contrast (stone protocol)    Hepatic panel    Lipase    Peripheral IV: Standard    UA with Microscopic    US Abdomen Limited      Orders Needing Specimen Collection     None      Pending Results     No orders found from 11/24/2017 to 11/27/2017.            Pending Culture Results     No orders found from 11/24/2017 to 11/27/2017.            Pending Results Instructions     If you had any lab results that were not finalized at the time of your Discharge, you can call the ED Lab Result RN at [phone]. You will be contacted by this team for any positive Lab results or changes in treatment. The nurses are available 7 days a week from 10A to 6:30P.  You can leave a message 24 hours per day and they will return your call.        Thank you for choosing [facility]       Thank you for choosing [facility] for your care. Our goal is always to provide you with excellent care. Hearing back from our patients is one way we can continue to improve our services. Please take a few minutes to complete the written survey that you may receive in the mail after you visit with us. Thank you!        [facility] Information     [facility] gives you secure access to your electronic health record. If you see a primary care provider, you can also send messages to your care team  and make appointments. If you have questions, please call your primary care clinic.  If you do not have a primary care provider, please call [phone] and they will assist you.        Care EveryWhere ID     This is your Care EveryWhere ID. This could be used by other organizations to access your [facility] medical records  [id number]        Equal Access to Services     STORMY TYLER : Lilliam Johnson, manuel bailey, allison cortez, rudi dougherty . So [facility] [phone].    ATENCIÓN: Si habla español, tiene a gorman disposición servicios gratuitos de asistencia lingüística. Llame al [phone].    We comply with applicable federal civil rights laws and [place] laws. We do not discriminate on the basis of race, color, national origin, age, disability, sex, sexual orientation, or gender identity.            After Visit Summary       This is your record. Keep this with you and show to your community pharmacist(s) and doctor(s) at your next visit.

## 2017-11-26 NOTE — ED NOTES
Here with mid epigastric pain that radiates into back. States it started about two weeks ago and is usually worse in the morning when she wakes up. She reports a decrease in her appetite.

## 2017-11-26 NOTE — ED PROVIDER NOTES
History     Chief Complaint   Patient presents with     Abdominal Pain     HPI  Jennifer Hoffman is a 47 year old female who presents with abdominal pain this been going on for the past 2 weeks.  This seems to be worse in the morning time.  A lot of time she'll be able to get up walk around the pain will get better.  She states taken a deep breath seems to make the pain worse.  She states eating or drinking is not affected although she has had a decreased appetite.  She denies any dysuria or hematuria.  Bowel movements have been normal, there is no constipation or diarrhea.  Patient denies any fevers or chills.  She did think it could be related to indigestion so she tried some Tums but this did not help much.  Patient is not had any surgeries on her abdomen except for a hiatal hernia repair when she was a kid.    Problem List:    Patient Active Problem List    Diagnosis Date Noted     Chemical dependency (H) 04/06/2015     Priority: Medium     Chronic low back pain 10/24/2014     Priority: Medium     Saturninos MD at [facility] [place].        CARDIOVASCULAR SCREENING; LDL GOAL LESS THAN 100 10/31/2010     Priority: Medium     Idiopathic cardiomyopathy (H) 07/20/2010     Priority: Medium     Major depressive disorder, single episode, severe (H) 11/20/2006     Priority: Medium     Stable on Effexor and Trazodone  Problem list name updated by automated process. Provider to review          Past Medical History:    Past Medical History:   Diagnosis Date     Anemia, unspecified      Anxiety state, unspecified      Apnea      Contact dermatitis and other eczema due to drugs and medicines in contact with skin 05/16/2007     Contact dermatitis and other eczema due to plants (except food)      Depressive disorder, not elsewhere classified      Depressive disorder, not elsewhere classified 11/09/08     Displacement of lumbar intervertebral disc without myelopathy      Duodenitis with hemorrhage 11/09/08     Erythema  multiforme 07/22/2005     Lumbago 02/20/10     Myocardial infarction 2010     Other pulmonary insufficiency, not elsewhere classified      Poisoning and toxic reactions caused by other plants      Pulmonary insufficiency following trauma and surgery 05/28/06     Syncope 7/7/10     Syncope and collapse        Past Surgical History:    Past Surgical History:   Procedure Laterality Date     C LUMBAR SPINE FUSION,ANTER APPRCH  2012    L4 and L5 to S1     HC DEBRIDE SKIN/SUBQ TISSUE  11/16/09    Post-op wound     HC INJ EPIDURAL LUMBAR/SACRAL W/WO CONTRAST  2009    Left L4-5     HC LAP,FULGURATE/EXCISE LESIONS  02/27/2007    Dx lap, fulguration of endometriosis.  [facility]     HC REMOVAL OF TONSILS,<11 Y/O  Age 7 or 8     HC TOOTH EXTRACTION W/FORCEP  Age 17    [place] teeth extraction x4.      UGI ENDOSCOPY, SIMPLE EXAM  11/08/08     HYSTERECTOMY      approx 2008 - ovaries still in, unsure about cervix. no cancer. had fibroids.     HYSTERECTOMY, PAP NO LONGER INDICATED       LAMINECT/DISCECTOMY, LUMBAR  10/24/09     TUBAL LIGATION  02/27/2007    [facility]       Family History:    Family History   Problem Relation Age of Onset     Anxiety Disorder Father      Anxiety Disorder Brother      Bipolar Disorder Sister      Suicide Paternal Uncle      Heart Failure Mother      Heart Failure Father      Breast Cancer Maternal Aunt      CANCER Maternal Grandfather      stomach     CANCER Paternal Grandmother      stomach     Depression Father      Alcohol/Drug Father      Depression Brother        Social History:  Marital Status:   [2]  Social History   Substance Use Topics     Smoking status: Never Smoker     Smokeless tobacco: Never Used     Alcohol use No        Medications:      traZODone (DESYREL) 100 MG tablet   venlafaxine (EFFEXOR-XR) 150 MG 24 hr capsule   Fexofenadine HCl (ALLEGRA PO)   EPINEPHrine (EPIPEN/ADRENACLICK/OR ANY BX GENERIC EQUIV) 0.3 MG/0.3ML injection 2-pack   EPINEPHrine (EPIPEN) 0.3 MG/0.3ML  injection         Review of Systems   All other systems reviewed and are negative.      Physical Exam   BP: 129/71  Heart Rate: 81  Temp: 97  F (36.1  C)  Resp: 14  Weight: 68.5 kg (151 lb)  SpO2: 100 %      Physical Exam   Constitutional: She is oriented to person, place, and time. She appears well-developed and well-nourished. No distress.   HENT:   Head: Normocephalic and atraumatic.   Eyes: Conjunctivae are normal.   Cardiovascular: Normal rate, regular rhythm and normal heart sounds.  Exam reveals no gallop and no friction rub.    No murmur heard.  Pulmonary/Chest: Effort normal and breath sounds normal. No respiratory distress. She has no wheezes. She has no rales.   Abdominal: Soft. Bowel sounds are normal. She exhibits no distension. There is tenderness (ruq/luq). There is no rebound and no guarding.   Musculoskeletal: Normal range of motion. She exhibits no edema.   Neurological: She is alert and oriented to person, place, and time.   Skin: She is not diaphoretic.   Nursing note and vitals reviewed.      ED Course     ED Course     Procedures           Results for orders placed or performed during the hospital encounter of 11/26/17   US Abdomen Limited    Narrative    RIGHT UPPER QUADRANT ULTRASOUND November 26, 2017 10:05 AM    HISTORY: Right upper quadrant pain.    COMPARISON: 3/28/2011.    FINDINGS:    Gallbladder: Normal with no cholelithiasis, wall thickening or focal  tenderness.      Bile ducts: Minimal dilation of the common hepatic duct, measuring 6  mm. No intrahepatic biliary dilatation.    Liver: Probable cyst in the left lobe measuring 1.1 cm. Otherwise  unremarkable.    Pancreas: Partially obscured, but normal where seen.    Right kidney: Normal.       Impression    IMPRESSION:    1. Normal sonographic evaluation of the gallbladder.  2. Minimal dilation of the common hepatic duct. This is unchanged  compared with the prior ultrasound.    KAILEY WRIGHT MD   CT Abdomen Pelvis WITHOUT Contrast  (stone protocol)    Narrative    CT ABDOMEN/PELVIS WITHOUT CONTRAST November 26, 2017 11:23 AM     HISTORY: Right upper back pain and abdomen pain.     TECHNIQUE: No IV contrast material. Radiation dose for this scan was  reduced using automated exposure control, adjustment of the mA and/or  kV according to patient size, or iterative reconstruction technique.    COMPARISON: 3/28/2011    FINDINGS: There is mild atelectasis or scarring in both lung bases. A  small low-attenuation lesion in the left hepatic lobe is unchanged.  Within the limits of noncontrast technique, no acute abnormality is  seen in the liver, spleen, pancreas, or adrenal glands. No renal or  ureteral calculi are seen. There is no hydronephrosis. Lumbar fusion  hardware causes beam hardening artifact in the lower abdomen and  pelvis. Moderate to large amount of stool throughout the colon. No  evidence of small bowel obstruction. Appendix is not identified. There  is a 5.2 cm rounded lesion in the right adnexa, possibly a right  ovarian lesion. There is no free intraperitoneal air or ascites. Small  sclerotic lesion in the left iliac bone is unchanged and likely  represents a bone island. Mild degenerative changes of the sacroiliac  joints.      Impression    IMPRESSION:   1. No evidence of urinary tract calculi or hydronephrosis.  2. 5.2 cm lesion in the right adnexa, possibly related to the right  ovary. This should be further evaluated with pelvic ultrasound.   UA with Microscopic   Result Value Ref Range    Color Urine Straw     Appearance Urine Slightly Cloudy     Glucose Urine Negative NEG^Negative mg/dL    Bilirubin Urine Negative NEG^Negative    Ketones Urine Negative NEG^Negative mg/dL    Specific Gravity Urine 1.003 1.003 - 1.035    Blood Urine Small (A) NEG^Negative    pH Urine 9.0 (H) 5.0 - 7.0 pH    Protein Albumin Urine Negative NEG^Negative mg/dL    Urobilinogen mg/dL 0.0 0.0 - 2.0 mg/dL    Nitrite Urine Negative NEG^Negative     Leukocyte Esterase Urine Negative NEG^Negative    Source Midstream Urine     WBC Urine 1 0 - 2 /HPF    RBC Urine <1 0 - 2 /HPF    Bacteria Urine Few (A) NEG^Negative /HPF    Squamous Epithelial /HPF Urine 6 (H) 0 - 1 /HPF    Mucous Urine Present (A) NEG^Negative /LPF   Basic metabolic panel   Result Value Ref Range    Sodium 142 133 - 144 mmol/L    Potassium 4.1 3.4 - 5.3 mmol/L    Chloride 107 94 - 109 mmol/L    Carbon Dioxide 26 20 - 32 mmol/L    Anion Gap 9 3 - 14 mmol/L    Glucose 99 70 - 99 mg/dL    Urea Nitrogen 12 7 - 30 mg/dL    Creatinine 0.81 0.52 - 1.04 mg/dL    GFR Estimate 76 >60 mL/min/1.7m2    GFR Estimate If Black >90 >60 mL/min/1.7m2    Calcium 8.7 8.5 - 10.1 mg/dL   CBC with platelets differential   Result Value Ref Range    WBC 4.8 4.0 - 11.0 10e9/L    RBC Count 4.44 3.8 - 5.2 10e12/L    Hemoglobin 13.4 11.7 - 15.7 g/dL    Hematocrit 42.9 35.0 - 47.0 %    MCV 97 78 - 100 fl    MCH 30.2 26.5 - 33.0 pg    MCHC 31.2 (L) 31.5 - 36.5 g/dL    RDW 11.7 10.0 - 15.0 %    Platelet Count 215 150 - 450 10e9/L    Diff Method Automated Method     % Neutrophils 63.4 %    % Lymphocytes 21.3 %    % Monocytes 10.3 %    % Eosinophils 4.2 %    % Basophils 0.8 %    % Immature Granulocytes 0.0 %    Absolute Neutrophil 3.0 1.6 - 8.3 10e9/L    Absolute Lymphocytes 1.0 0.8 - 5.3 10e9/L    Absolute Monocytes 0.5 0.0 - 1.3 10e9/L    Absolute Eosinophils 0.2 0.0 - 0.7 10e9/L    Absolute Basophils 0.0 0.0 - 0.2 10e9/L    Abs Immature Granulocytes 0.0 0 - 0.4 10e9/L   Hepatic panel   Result Value Ref Range    Bilirubin Direct 0.2 0.0 - 0.2 mg/dL    Bilirubin Total 0.9 0.2 - 1.3 mg/dL    Albumin 3.9 3.4 - 5.0 g/dL    Protein Total 7.2 6.8 - 8.8 g/dL    Alkaline Phosphatase 48 40 - 150 U/L    ALT 20 0 - 50 U/L    AST 21 0 - 45 U/L   Lipase   Result Value Ref Range    Lipase 132 73 - 393 U/L     Medications   HYDROmorphone (PF) (DILAUDID) injection 0.5 mg (0.5 mg Intravenous Given 11/26/17 1018)   lidocaine (viscous) (XYLOCAINE)  2 % 15 mL, alum & mag hydroxide-simethicone (MYLANTA ES/MAALOX  ES) 15 mL GI Cocktail (30 mLs Oral Given 11/26/17 2401)     labs are reviewed and were pretty much unremarkable.  Initially I was worried about possible gallstones based on her examination but the ultrasound was unremarkable.  I then proceed to do a CT scan of the belly just because she was having worsening pain, to make sure you're missing anything.  This is also unremarkable.  She did have a fair amount of stool retention so this pain could be related to constipation type pain.  They did see a right ovarian cyst but most for pain is upper and on the left and right, this doesn't fit with just the right sided ovarian cyst.  At this point I think is safe to discharge the patient home.  I went to have her take MiraLAX twice a day until she has large bowel movement.  I told her she continues to have pain after this, to follow up with her doctor and clinic.  Shouldn't is safe to be discharged home, I think we've ruled out any emergency medical condition at this point.    Assessments & Plan (with Medical Decision Making)  abdominal pain      I have reviewed the nursing notes.    I have reviewed the findings, diagnosis, plan and need for follow up with the patient.              11/26/2017   [facility] EMERGENCY DEPARTMENT     Kolton Cifuentes MD  11/26/17 6107

## 2018-02-01 DIAGNOSIS — F32.2 MAJOR DEPRESSIVE DISORDER, SINGLE EPISODE, SEVERE WITHOUT PSYCHOTIC FEATURES (H): ICD-10-CM

## 2018-02-01 NOTE — TELEPHONE ENCOUNTER
"Requested Prescriptions   Pending Prescriptions Disp Refills     venlafaxine (EFFEXOR-XR) 150 MG 24 hr capsule [Pharmacy Med Name: VENLAFAXINE  CAP 150MG ER] 90 capsule 0     Sig: FOR DIRECTIONS ON HOW TO   TAKE THIS MEDICINE, READ   THE ENCLOSED MEDICATION    INFORMATION FORM    Serotonin-Norepinephrine Reuptake Inhibitors  Passed    2/1/2018  6:11 AM       Passed - Blood pressure under 140/90    BP Readings from Last 3 Encounters:   11/26/17 123/60   10/04/17 102/78   01/22/16 108/72                Passed - PHQ-9 score of less than 5 in past 6 months    The PHQ-9 criteria is meant to fail. It requires a PHQ-9 score review         Passed - Patient is age 18 or older       Passed - No active pregnancy on record       Passed - Normal serum creatinine on file in past 12 months    Recent Labs   Lab Test  11/26/17   0947   CR  0.81            Passed - No positive pregnancy test in past 12 months       Passed - Recent (6 mo) or future visit with authorizing provider's specialty    Patient had office visit in the last 6 months or has a visit in the next 30 days with authorizing provider.  See \"Patient Info\" tab in inbasket, or \"Choose Columns\" in Meds & Orders section of the refill encounter.            Last Written Prescription Date:  10/4/17  Last Fill Quantity: 93,  # refills: 3   Last Office Visit with [facility] provider:  10/4/17   Future Office Visit:       "

## 2018-02-02 RX ORDER — VENLAFAXINE HYDROCHLORIDE 150 MG/1
CAPSULE, EXTENDED RELEASE ORAL
Qty: 90 CAPSULE | Refills: 0 | OUTPATIENT
Start: 2018-02-02

## 2018-02-02 NOTE — TELEPHONE ENCOUNTER
PHQ-9 SCORE 5/4/2015 1/22/2016 10/4/2017   Total Score 9 - -   Total Score - 0 1     Patient should have refills to 10/4/18.  Please call to confirm.  Denied refill.

## 2018-04-16 DIAGNOSIS — F32.2 MAJOR DEPRESSIVE DISORDER, SINGLE EPISODE, SEVERE WITHOUT PSYCHOTIC FEATURES (H): ICD-10-CM

## 2018-04-16 DIAGNOSIS — G47.00 PERSISTENT INSOMNIA: ICD-10-CM

## 2018-04-16 RX ORDER — TRAZODONE HYDROCHLORIDE 100 MG/1
50-100 TABLET ORAL AT BEDTIME
Qty: 90 TABLET | Refills: 1 | Status: SHIPPED | OUTPATIENT
Start: 2018-04-16 | End: 2018-12-31

## 2018-04-16 NOTE — TELEPHONE ENCOUNTER
Trazodone:  Mail order requesting refill.   Prescription approved per [facility] Refill Protocol.    Faith Rodriguez, RN, BSN

## 2018-06-24 ENCOUNTER — HOSPITAL ENCOUNTER (EMERGENCY)
Facility: CLINIC | Age: 48
Discharge: HOME | End: 2018-06-25
Attending: NURSE PRACTITIONER | Admitting: NURSE PRACTITIONER
Payer: COMMERCIAL

## 2018-06-24 DIAGNOSIS — R19.7 DIARRHEA, UNSPECIFIED TYPE: ICD-10-CM

## 2018-06-24 LAB
ALBUMIN SERPL-MCNC: 3.7 G/DL (ref 3.4–5)
ALBUMIN UR-MCNC: NEGATIVE MG/DL
ALP SERPL-CCNC: 48 U/L (ref 40–150)
ALT SERPL W P-5'-P-CCNC: 24 U/L (ref 0–50)
ANION GAP SERPL CALCULATED.3IONS-SCNC: 8 MMOL/L (ref 3–14)
APPEARANCE UR: CLEAR
AST SERPL W P-5'-P-CCNC: 21 U/L (ref 0–45)
BACTERIA #/AREA URNS HPF: ABNORMAL /HPF
BASOPHILS # BLD AUTO: 0 10E9/L (ref 0–0.2)
BASOPHILS NFR BLD AUTO: 0.4 %
BILIRUB SERPL-MCNC: 0.5 MG/DL (ref 0.2–1.3)
BILIRUB UR QL STRIP: NEGATIVE
BUN SERPL-MCNC: 14 MG/DL (ref 7–30)
CALCIUM SERPL-MCNC: 8.4 MG/DL (ref 8.5–10.1)
CHLORIDE SERPL-SCNC: 108 MMOL/L (ref 94–109)
CO2 SERPL-SCNC: 26 MMOL/L (ref 20–32)
COLOR UR AUTO: YELLOW
CREAT SERPL-MCNC: 0.74 MG/DL (ref 0.52–1.04)
CRP SERPL-MCNC: 4.9 MG/L (ref 0–8)
DIFFERENTIAL METHOD BLD: ABNORMAL
EOSINOPHIL # BLD AUTO: 0.3 10E9/L (ref 0–0.7)
EOSINOPHIL NFR BLD AUTO: 3.8 %
ERYTHROCYTE [DISTWIDTH] IN BLOOD BY AUTOMATED COUNT: 12.4 % (ref 10–15)
GFR SERPL CREATININE-BSD FRML MDRD: 84 ML/MIN/1.7M2
GLUCOSE SERPL-MCNC: 79 MG/DL (ref 70–99)
GLUCOSE UR STRIP-MCNC: NEGATIVE MG/DL
HCT VFR BLD AUTO: 41.4 % (ref 35–47)
HGB BLD-MCNC: 13.9 G/DL (ref 11.7–15.7)
HGB UR QL STRIP: ABNORMAL
KETONES UR STRIP-MCNC: NEGATIVE MG/DL
LEUKOCYTE ESTERASE UR QL STRIP: ABNORMAL
LIPASE SERPL-CCNC: 269 U/L (ref 73–393)
LYMPHOCYTES # BLD AUTO: 0.8 10E9/L (ref 0.8–5.3)
LYMPHOCYTES NFR BLD AUTO: 10.3 %
MCH RBC QN AUTO: 33.2 PG (ref 26.5–33)
MCHC RBC AUTO-ENTMCNC: 33.6 G/DL (ref 31.5–36.5)
MCV RBC AUTO: 99 FL (ref 78–100)
MONOCYTES # BLD AUTO: 0.6 10E9/L (ref 0–1.3)
MONOCYTES NFR BLD AUTO: 8.2 %
MUCOUS THREADS #/AREA URNS LPF: PRESENT /LPF
NEUTROPHILS # BLD AUTO: 5.9 10E9/L (ref 1.6–8.3)
NEUTROPHILS NFR BLD AUTO: 77.3 %
NITRATE UR QL: NEGATIVE
PH UR STRIP: 6 PH (ref 5–7)
PLATELET # BLD AUTO: 191 10E9/L (ref 150–450)
POTASSIUM SERPL-SCNC: 3.8 MMOL/L (ref 3.4–5.3)
PROT SERPL-MCNC: 7.1 G/DL (ref 6.8–8.8)
RBC # BLD AUTO: 4.19 10E12/L (ref 3.8–5.2)
RBC #/AREA URNS AUTO: 3 /HPF (ref 0–2)
SODIUM SERPL-SCNC: 142 MMOL/L (ref 133–144)
SOURCE: ABNORMAL
SP GR UR STRIP: 1.01 (ref 1–1.03)
SQUAMOUS #/AREA URNS AUTO: 5 /HPF (ref 0–1)
UROBILINOGEN UR STRIP-MCNC: 0 MG/DL (ref 0–2)
WBC # BLD AUTO: 7.6 10E9/L (ref 4–11)
WBC #/AREA URNS AUTO: 7 /HPF (ref 0–5)

## 2018-06-24 PROCEDURE — 99284 EMERGENCY DEPT VISIT MOD MDM: CPT | Mod: 25 | Performed by: NURSE PRACTITIONER

## 2018-06-24 PROCEDURE — 96374 THER/PROPH/DIAG INJ IV PUSH: CPT | Performed by: NURSE PRACTITIONER

## 2018-06-24 PROCEDURE — 87086 URINE CULTURE/COLONY COUNT: CPT | Performed by: NURSE PRACTITIONER

## 2018-06-24 PROCEDURE — 96361 HYDRATE IV INFUSION ADD-ON: CPT | Performed by: NURSE PRACTITIONER

## 2018-06-24 PROCEDURE — 85025 COMPLETE CBC W/AUTO DIFF WBC: CPT | Performed by: NURSE PRACTITIONER

## 2018-06-24 PROCEDURE — 80053 COMPREHEN METABOLIC PANEL: CPT | Performed by: NURSE PRACTITIONER

## 2018-06-24 PROCEDURE — 86140 C-REACTIVE PROTEIN: CPT | Performed by: NURSE PRACTITIONER

## 2018-06-24 PROCEDURE — 96375 TX/PRO/DX INJ NEW DRUG ADDON: CPT | Performed by: NURSE PRACTITIONER

## 2018-06-24 PROCEDURE — 83690 ASSAY OF LIPASE: CPT | Performed by: NURSE PRACTITIONER

## 2018-06-24 PROCEDURE — 81001 URINALYSIS AUTO W/SCOPE: CPT | Performed by: EMERGENCY MEDICINE

## 2018-06-24 PROCEDURE — 25000128 H RX IP 250 OP 636: Performed by: NURSE PRACTITIONER

## 2018-06-24 RX ORDER — ONDANSETRON 2 MG/ML
4 INJECTION INTRAMUSCULAR; INTRAVENOUS EVERY 30 MIN PRN
Status: DISCONTINUED | OUTPATIENT
Start: 2018-06-24 | End: 2018-06-25 | Stop reason: HOSPADM

## 2018-06-24 RX ORDER — KETOROLAC TROMETHAMINE 30 MG/ML
30 INJECTION, SOLUTION INTRAMUSCULAR; INTRAVENOUS ONCE
Status: COMPLETED | OUTPATIENT
Start: 2018-06-24 | End: 2018-06-24

## 2018-06-24 RX ADMIN — KETOROLAC TROMETHAMINE 30 MG: 30 INJECTION, SOLUTION INTRAMUSCULAR at 23:27

## 2018-06-24 RX ADMIN — ONDANSETRON 4 MG: 2 INJECTION INTRAMUSCULAR; INTRAVENOUS at 23:27

## 2018-06-24 RX ADMIN — SODIUM CHLORIDE 1000 ML: 9 INJECTION, SOLUTION INTRAVENOUS at 23:10

## 2018-06-24 ASSESSMENT — ENCOUNTER SYMPTOMS
ACTIVITY CHANGE: 1
ABDOMINAL PAIN: 1
DIARRHEA: 1
APPETITE CHANGE: 1
NAUSEA: 1
BLOOD IN STOOL: 1

## 2018-06-24 NOTE — ED AVS SNAPSHOT
[facility] Emergency Department    [address] [place] [address]    Phone:  [phone]    Fax:  [phone]                                       Jennifer Hoffman   MRN: [id number]    Department:  [facility] Emergency Department   Date of Visit:  6/24/2018           Patient Information     Date Of Birth          1970        Your diagnoses for this visit were:     Diarrhea, unspecified type        You were seen by Aracely Mcgraw, DARYL CNP.      Follow-up Information     Follow up with Marni Montemayor MD.    Specialty:  Family Practice    Why:  As needed    Contact information:    [address] [place] [id number] [phone]          Follow up with [facility] Emergency Department.    Specialty:  EMERGENCY MEDICINE    Why:  If symptoms worsen    Contact information:    [address] [place] [id number] [phone]    Additional information:    From [address]: Exit at [employer] on south side of [place]. Turn right on [address]. Turn left at stoplight on [address]. [facility] will be in view two blocks ahead        Discharge Instructions         Diarrhea with Uncertain Cause (Adult)    Diarrhea is when stools are loose and watery. This can be caused by:    Viral infections    Bacterial infections    Food poisoning    Parasites    Irritable bowel syndrome (IBS)    Inflammatory bowel diseases such as ulcerative colitis, Crohn's disease, and celiac disease    Food intolerance, such as to lactose, the sugar found in milk and milk products    Reaction to medicines like antibiotics, laxatives, cancer drugs, and antacids  Along with diarrhea, you may also have:    Abdominal pain and cramping    Nausea and vomiting    Loss of bowel control    Fever and chills    Bloody stools  In some cases, antibiotics may help to treat diarrhea. You may have a stool sample test. This is done to see what is causing your diarrhea,  and if antibiotics will help treat it. The results of a stool sample test may take up to 2 days. The healthcare provider may not give you antibiotics until he or she has the stool test results.  Diarrhea can cause dehydration. This is the loss of too much water and other fluids from the body. When this occurs, body fluid must be replaced. This can be done with oral rehydration solutions. Oral rehydration solutions are available at drugstores and grocery stores without a prescription.  Home care  Follow all instructions given by your healthcare provider. Rest at home for the next 24 hours, or until you feel better. Avoid caffeine, tobacco, and alcohol. These can make diarrhea, cramping, and pain worse.  If taking medicines:    Don t take over-the-counter diarrhea or nausea medicines unless your healthcare provider tells you to.    You may use acetaminophen or NSAID medicines like ibuprofen or naproxen to reduce pain and fever. Don t use these if you have chronic liver or kidney disease, or ever had a stomach ulcer or gastrointestinal bleeding. Don't use NSAID medicines if you are already taking one for another condition (like arthritis) or are on daily aspirin therapy (such as for heart disease or after a stroke). Talk with your healthcare provider first.    If antibiotics were prescribed, be sure you take them until they are finished. Don t stop taking them even when you feel better. Antibiotics must be taken as a full course.  To prevent the spread of illness:    Remember that washing with soap and water and using alcohol-based  is the best way to prevent the spread of infection.    Clean the toilet after each use.    Wash your hands before eating.    Wash your hands before and after preparing food. Keep in mind that people with diarrhea or vomiting should not prepare food for others.    Wash your hands after using cutting boards, countertops, and knives that have been in contact with raw foods.    Wash  and then peel fruits and vegetables.    Keep uncooked meats away from cooked and ready-to-eat foods.    Use a food thermometer when cooking. Cook poultry to at least 165 F (74 C). Cook ground meat (beef, veal, pork, lamb) to at least 160 F (71 C). Cook fresh beef, veal, lamb, and pork to at least 145 F (63 C).    Don t eat raw or undercooked eggs (poached or rambo side up), poultry, meat, or unpasteurized milk and juices.  Food and drinks  The main goal while treating vomiting or diarrhea is to prevent dehydration. This is done by taking small amounts of liquids often.    Keep in mind that liquids are more important than food right now.    Drink only small amounts of liquids at a time.    Don t force yourself to eat, especially if you are having cramping, vomiting, or diarrhea. Don t eat large amounts at a time, even if you are hungry.    If you eat, avoid fatty, greasy, spicy, or fried foods.    Don t eat dairy foods or drink milk if you have diarrhea. These can make diarrhea worse.  During the first 24 hours you can try:    Oral rehydration solutions. Do not use sports drinks. They have too much sugar and not enough electrolytes.    Soft drinks without caffeine    Ginger ale    Water (plain or flavored)    Decaf tea or coffee    Clear broth, consommé, or bouillon    Gelatin, popsicles, or frozen fruit juice bars  The second 24 hours, if you are feeling better, you can add:    Hot cereal, plain toast, bread, rolls, or crackers    Plain noodles, rice, mashed potatoes, chicken noodle soup, or rice soup    Unsweetened canned fruit (no pineapple)    Bananas  As you recover:    Limit fat intake to less than 15 grams per day. Don t eat margarine, butter, oils, mayonnaise, sauces, gravies, fried foods, peanut butter, meat, poultry, or fish.    Limit fiber. Don t eat raw or cooked vegetables, fresh fruits except bananas, or bran cereals.    Limit caffeine and chocolate.    Limit dairy.    Don t use spices or seasonings  except salt.    Go back to your normal diet over time, as you feel better and your symptoms improve.    If the symptoms come back, go back to a simple diet or clear liquids.  Follow-up care  Follow up with your healthcare provider, or as advised. If a stool sample was taken or cultures were done, call the healthcare provider for the results as instructed.  Call 911  Call 911 if you have any of these symptoms:    Trouble breathing    Confusion    Extreme drowsiness or trouble walking    Loss of consciousness    Rapid heart rate    Chest pain    Stiff neck    Seizure  When to seek medical advice  Call your healthcare provider right away if any of these occur:    Abdominal pain that gets worse    Constant lower right abdominal pain    Continued vomiting and inability to keep liquids down    Diarrhea more than 5 times a day    Blood in vomit or stool    Dark urine or no urine for 8 hours, dry mouth and tongue, tiredness, weakness, or dizziness    Drowsiness    New rash    You don t get better in 2 to 3 days    Fever of 100.4 F (38 C) or higher, or as directed by your healthcare provider  Date Last Reviewed: 1/3/2016    5387-7489 [employer]. [address] [place] [address]. All rights reserved. This information is not intended as a substitute for professional medical care. Always follow your healthcare professional's instructions.      I would highly recommend starting a probiotic, such as Culturelle, twice daily until this resolves.  If you continue to have loose stools please obtain a sample and bring them to lab.  Make sure you are staying well-hydrated.  Ibuprofen/Tylenol for stomach cramping.    24 Hour Appointment Hotline       To make an appointment at any [facility] clinic, call [phone] ([phone]). If you don't have a family doctor or clinic, we will help you find one. [facility] clinics are conveniently located to serve the needs of you and your family.          ED Discharge  Orders     Clostridium difficile toxin B           Enteric Bacteria and Virus Panel by JULIÁN Stool           Ova and Parasite Exam Routine                    Review of your medicines      Our records show that you are taking the medicines listed below. If these are incorrect, please call your family doctor or clinic.        Dose / Directions Last dose taken    ALLEGRA PO        Refills:  0        * EPINEPHrine 0.3 MG/0.3ML injection   Commonly known as:  EPIPEN   Dose:  0.3 mg   Quantity:  3 each        Inject 0.3 mLs into the muscle once as needed for anaphylaxis for 1 dose.   Refills:  1        * EPINEPHrine 0.3 MG/0.3ML injection 2-pack   Commonly known as:  EPIPEN/ADRENACLICK/or ANY BX GENERIC EQUIV   Dose:  0.3 mg   Quantity:  0.6 mL        Inject 0.3 mLs (0.3 mg) into the muscle as needed for anaphylaxis   Refills:  1        traZODone 100 MG tablet   Commonly known as:  DESYREL   Dose:   mg   Quantity:  90 tablet        Take 0.5-1 tablets ( mg) by mouth At Bedtime   Refills:  1        venlafaxine 150 MG 24 hr capsule   Commonly known as:  EFFEXOR-XR   Dose:  150 mg   Quantity:  93 capsule        Take 1 capsule (150 mg) by mouth daily   Refills:  3        * Notice:  This list has 2 medication(s) that are the same as other medications prescribed for you. Read the directions carefully, and ask your doctor or other care provider to review them with you.            Procedures and tests performed during your visit     CBC with platelets differential    CRP inflammation    Comprehensive metabolic panel    Lipase    Peripheral IV catheter    Routine UA with microscopic    Urine Culture      Orders Needing Specimen Collection     Ordered          06/24/18 2254  Clostridium difficile toxin B PCR - ROUTINE, Prio: Routine, Needs to be Collected     Scheduled Task Status   06/24/18 7596 Collect Clostridium difficile toxin B PCR Open   Order Class:  PCU Collect                06/24/18 225  Enteric Bacteria and  Virus Panel by JULIÁN Stool - STAT, Prio: STAT, Needs to be Collected     Scheduled Task Status   06/24/18 2255 Collect Enteric Bacteria and Virus Panel by JULIÁN Stool Open   Order Class:  PCU Collect                  Pending Results     Date and Time Order Name Status Description    6/24/2018 2250 Urine Culture In process             Pending Culture Results     Date and Time Order Name Status Description    6/24/2018 2250 Urine Culture In process             Pending Results Instructions     If you had any lab results that were not finalized at the time of your Discharge, you can call the ED Lab Result RN at [phone]. You will be contacted by this team for any positive Lab results or changes in treatment. The nurses are available 7 days a week from 10A to 6:30P.  You can leave a message 24 hours per day and they will return your call.        Thank you for choosing [facility]       Thank you for choosing [facility] for your care. Our goal is always to provide you with excellent care. Hearing back from our patients is one way we can continue to improve our services. Please take a few minutes to complete the written survey that you may receive in the mail after you visit with us. Thank you!        [employer] Information     [employer] gives you secure access to your electronic health record. If you see a primary care provider, you can also send messages to your care team and make appointments. If you have questions, please call your primary care clinic.  If you do not have a primary care provider, please call [phone] and they will assist you.        Care EveryWhere ID     This is your Care EveryWhere ID. This could be used by other organizations to access your [facility] medical records  [id number]        Equal Access to Services     [facility] NAYELI : Hadii justin ferreirao Soangel, waaxda luqadaha, qaybta kaalmada rudi cortez. So [facility] [phone].    ATENCIÓN: maricel Andrews  gorman disposición servicios gratuitos de asistencia lingüística. Lljumana al [phone].    We comply with applicable federal civil rights laws and [place] laws. We do not discriminate on the basis of race, color, national origin, age, disability, sex, sexual orientation, or gender identity.            After Visit Summary       This is your record. Keep this with you and show to your community pharmacist(s) and doctor(s) at your next visit.

## 2018-06-24 NOTE — ED AVS SNAPSHOT
[facility] Emergency Department    [address] [place] [address]    Phone:  [phone]    Fax:  [phone]                                       Jennifer Hoffman   MRN: [id number]    Department:  [facility] Emergency Department   Date of Visit:  6/24/2018           After Visit Summary Signature Page     I have received my discharge instructions, and my questions have been answered. I have discussed any challenges I see with this plan with the nurse or doctor.    ..........................................................................................................................................  Patient/Patient Representative Signature      ..........................................................................................................................................  Patient Representative Print Name and Relationship to Patient    ..................................................               ................................................  Date                                            Time    ..........................................................................................................................................  Reviewed by Signature/Title    ...................................................              ..............................................  Date                                                            Time

## 2018-06-25 VITALS
HEART RATE: 75 BPM | DIASTOLIC BLOOD PRESSURE: 73 MMHG | SYSTOLIC BLOOD PRESSURE: 113 MMHG | OXYGEN SATURATION: 99 % | TEMPERATURE: 97.3 F | RESPIRATION RATE: 20 BRPM

## 2018-06-25 NOTE — ED TRIAGE NOTES
Pt with loose stools x 10 since this AM. Noted some redness and called the nurse line and was told to come in.

## 2018-06-25 NOTE — ED PROVIDER NOTES
History     Chief Complaint   Patient presents with     Diarrhea     HPI  Jennifer Hoffman is a 47 year old female who presents to the emergency department today with several episodes of diarrhea that started after patient woke up this morning.  Patient reports that she took an Imodium, her diarrhea has slowed down but now she complains of lower abdominal cramping.  Patient became concerned because with her last couple episodes of diarrhea she noticed light pink color in the stool and thought it was likely blood.  Patient denies any vomiting, she does endorse nausea when the stomach cramping becomes severe.  Patient is otherwise healthy, she denies any history of abdominal disorders.  Patient denies any recent travel, she denies any recent antibiotic use.  Patient and her  have eaten the same food the last couple days and he has no symptoms.    Problem List:    Patient Active Problem List    Diagnosis Date Noted     Chemical dependency (H) 04/06/2015     Priority: Medium     Chronic low back pain 10/24/2014     Priority: Medium     Nayan UREÑA at [facility].        CARDIOVASCULAR SCREENING; LDL GOAL LESS THAN 100 10/31/2010     Priority: Medium     Idiopathic cardiomyopathy (H) 07/20/2010     Priority: Medium     Major depressive disorder, single episode, severe (H) 11/20/2006     Priority: Medium     Stable on Effexor and Trazodone  Problem list name updated by automated process. Provider to review          Past Medical History:    Past Medical History:   Diagnosis Date     Anemia, unspecified      Anxiety state, unspecified      Apnea      Contact dermatitis and other eczema due to drugs and medicines in contact with skin 05/16/2007     Contact dermatitis and other eczema due to plants (except food)      Depressive disorder, not elsewhere classified      Depressive disorder, not elsewhere classified 11/09/08     Displacement of lumbar intervertebral disc without myelopathy      Duodenitis with  hemorrhage 11/09/08     Erythema multiforme 07/22/2005     Lumbago 02/20/10     Myocardial infarction 2010     Other pulmonary insufficiency, not elsewhere classified      Poisoning and toxic reactions caused by other plants      Pulmonary insufficiency following trauma and surgery 05/28/06     Syncope 7/7/10     Syncope and collapse        Past Surgical History:    Past Surgical History:   Procedure Laterality Date     C LUMBAR SPINE FUSION,ANTER APPRCH  2012    L4 and L5 to S1     HC DEBRIDE SKIN/SUBQ TISSUE  11/16/09    Post-op wound     HC INJ EPIDURAL LUMBAR/SACRAL W/WO CONTRAST  2009    Left L4-5     HC LAP,FULGURATE/EXCISE LESIONS  02/27/2007    Dx lap, fulguration of endometriosis.  [facility]     HC REMOVAL OF TONSILS,<11 Y/O  Age 7 or 8     HC TOOTH EXTRACTION W/FORCEP  Age 17    [place] teeth extraction x4.      UGI ENDOSCOPY, SIMPLE EXAM  11/08/08     HYSTERECTOMY      approx 2008 - ovaries still in, unsure about cervix. no cancer. had fibroids.     HYSTERECTOMY, PAP NO LONGER INDICATED       LAMINECT/DISCECTOMY, LUMBAR  10/24/09     TUBAL LIGATION  02/27/2007    [facility]       Family History:    Family History   Problem Relation Age of Onset     Anxiety Disorder Father      Anxiety Disorder Brother      Bipolar Disorder Sister      Suicide Paternal Uncle      Heart Failure Mother      Heart Failure Father      Breast Cancer Maternal Aunt      Cancer Maternal Grandfather      stomach     Cancer Paternal Grandmother      stomach     Depression Father      Alcohol/Drug Father      Depression Brother        Social History:  Marital Status:   [4]  Social History   Substance Use Topics     Smoking status: Never Smoker     Smokeless tobacco: Never Used     Alcohol use No        Medications:      EPINEPHrine (EPIPEN) 0.3 MG/0.3ML injection   EPINEPHrine (EPIPEN/ADRENACLICK/OR ANY BX GENERIC EQUIV) 0.3 MG/0.3ML injection 2-pack   Fexofenadine HCl (ALLEGRA PO)   traZODone (DESYREL) 100 MG tablet    venlafaxine (EFFEXOR-XR) 150 MG 24 hr capsule         Review of Systems   Constitutional: Positive for activity change and appetite change.   Gastrointestinal: Positive for abdominal pain, blood in stool, diarrhea and nausea.   All other systems reviewed and are negative.      Physical Exam   BP: 113/73  Pulse: 75  Temp: 97.3  F (36.3  C)  Resp: 20  SpO2: 99 %      Physical Exam   Constitutional: She is oriented to person, place, and time. She appears well-developed and well-nourished. No distress.   HENT:   Head: Normocephalic.   Mouth/Throat: Oropharynx is clear and moist.   Eyes: Conjunctivae are normal.   Neck: Normal range of motion.   Cardiovascular: Normal rate, regular rhythm and normal heart sounds.    Pulmonary/Chest: Effort normal and breath sounds normal.   Abdominal: Soft. She exhibits no distension. There is tenderness (Generalized, mild).   Hyperactive bowel sounds   Musculoskeletal: Normal range of motion.   Neurological: She is alert and oriented to person, place, and time.   Skin: Skin is warm and dry. She is not diaphoretic. No pallor.   Psychiatric: She has a normal mood and affect.       ED Course     ED Course     Procedures      Results for orders placed or performed during the hospital encounter of 06/24/18 (from the past 24 hour(s))   Routine UA with microscopic   Result Value Ref Range    Color Urine Yellow     Appearance Urine Clear     Glucose Urine Negative NEG^Negative mg/dL    Bilirubin Urine Negative NEG^Negative    Ketones Urine Negative NEG^Negative mg/dL    Specific Gravity Urine 1.015 1.003 - 1.035    Blood Urine Moderate (A) NEG^Negative    pH Urine 6.0 5.0 - 7.0 pH    Protein Albumin Urine Negative NEG^Negative mg/dL    Urobilinogen mg/dL 0.0 0.0 - 2.0 mg/dL    Nitrite Urine Negative NEG^Negative    Leukocyte Esterase Urine Trace (A) NEG^Negative    Source Midstream Urine     WBC Urine 7 (H) 0 - 5 /HPF    RBC Urine 3 (H) 0 - 2 /HPF    Bacteria Urine Few (A) NEG^Negative /HPF     Squamous Epithelial /HPF Urine 5 (H) 0 - 1 /HPF    Mucous Urine Present (A) NEG^Negative /LPF   CBC with platelets differential   Result Value Ref Range    WBC 7.6 4.0 - 11.0 10e9/L    RBC Count 4.19 3.8 - 5.2 10e12/L    Hemoglobin 13.9 11.7 - 15.7 g/dL    Hematocrit 41.4 35.0 - 47.0 %    MCV 99 78 - 100 fl    MCH 33.2 (H) 26.5 - 33.0 pg    MCHC 33.6 31.5 - 36.5 g/dL    RDW 12.4 10.0 - 15.0 %    Platelet Count 191 150 - 450 10e9/L    Diff Method Automated Method     % Neutrophils 77.3 %    % Lymphocytes 10.3 %    % Monocytes 8.2 %    % Eosinophils 3.8 %    % Basophils 0.4 %    Absolute Neutrophil 5.9 1.6 - 8.3 10e9/L    Absolute Lymphocytes 0.8 0.8 - 5.3 10e9/L    Absolute Monocytes 0.6 0.0 - 1.3 10e9/L    Absolute Eosinophils 0.3 0.0 - 0.7 10e9/L    Absolute Basophils 0.0 0.0 - 0.2 10e9/L   Comprehensive metabolic panel   Result Value Ref Range    Sodium 142 133 - 144 mmol/L    Potassium 3.8 3.4 - 5.3 mmol/L    Chloride 108 94 - 109 mmol/L    Carbon Dioxide 26 20 - 32 mmol/L    Anion Gap 8 3 - 14 mmol/L    Glucose 79 70 - 99 mg/dL    Urea Nitrogen 14 7 - 30 mg/dL    Creatinine 0.74 0.52 - 1.04 mg/dL    GFR Estimate 84 >60 mL/min/1.7m2    GFR Estimate If Black >90 >60 mL/min/1.7m2    Calcium 8.4 (L) 8.5 - 10.1 mg/dL    Bilirubin Total 0.5 0.2 - 1.3 mg/dL    Albumin 3.7 3.4 - 5.0 g/dL    Protein Total 7.1 6.8 - 8.8 g/dL    Alkaline Phosphatase 48 40 - 150 U/L    ALT 24 0 - 50 U/L    AST 21 0 - 45 U/L   Lipase   Result Value Ref Range    Lipase 269 73 - 393 U/L   CRP inflammation   Result Value Ref Range    CRP Inflammation 4.9 0.0 - 8.0 mg/L       Medications   0.9% sodium chloride BOLUS (1,000 mLs Intravenous New Bag 6/24/18 2310)   ondansetron (ZOFRAN) injection 4 mg (4 mg Intravenous Given 6/24/18 2327)   ketorolac (TORADOL) injection 30 mg (30 mg Intravenous Given 6/24/18 2327)       Assessments & Plan (with Medical Decision Making)  Jennifer is a 47 year old female who presents to the ED today with c/o diarrhea  several times since this morning.  Please refer to HPI and focused exam.  Patient arrives here hemodynamically stable, she is well-hydrated, she is nontoxic-appearing.  Exam reveals generalized abdominal tenderness that is mild and is otherwise unremarkable.  It is likely this is of viral etiology.  Patient has not been on any recent antibiotics to put her increased risk for Clostridium difficile.  She has not had any recent travel.  Stool samples were ordered here but patient was not able to provide any.  She was given a liter of fluid.  Blood work was obtained and given her concern for blood in the stool and returns reassuring.  CBC shows a white count of 7.6 with a hemoglobin of 13.9.  CMP is within normal limits.  Lipase is normal at 269, CRP is normal at 4.9.  Urinalysis does return with trace leukocyte esterase and 7 white cells, culture is pending.  Patient is feeling better here after medications and fluids, I feel she is stable to be discharged home with ongoing supportive care.  Patient was encouraged to start a probiotic.  Hydration was encouraged as well.  Patient continues to have loose stools she can provide a sample and bring it to lab at that time.  Patient can follow-up with primary care this next week if her symptoms have not improved.  Reasons to return to the emergency department were discussed.  Patient is agreeable to plan of care and was discharged in stable condition.     I have reviewed the nursing notes.    I have reviewed the findings, diagnosis, plan and need for follow up with the patient.    New Prescriptions    No medications on file       Final diagnoses:   Diarrhea, unspecified type       6/24/2018   [facility] EMERGENCY DEPARTMENT     Aracely Mcgraw, DARYL CNP  06/25/18 0007

## 2018-06-25 NOTE — DISCHARGE INSTRUCTIONS
Diarrhea with Uncertain Cause (Adult)    Diarrhea is when stools are loose and watery. This can be caused by:    Viral infections    Bacterial infections    Food poisoning    Parasites    Irritable bowel syndrome (IBS)    Inflammatory bowel diseases such as ulcerative colitis, Crohn's disease, and celiac disease    Food intolerance, such as to lactose, the sugar found in milk and milk products    Reaction to medicines like antibiotics, laxatives, cancer drugs, and antacids  Along with diarrhea, you may also have:    Abdominal pain and cramping    Nausea and vomiting    Loss of bowel control    Fever and chills    Bloody stools  In some cases, antibiotics may help to treat diarrhea. You may have a stool sample test. This is done to see what is causing your diarrhea, and if antibiotics will help treat it. The results of a stool sample test may take up to 2 days. The healthcare provider may not give you antibiotics until he or she has the stool test results.  Diarrhea can cause dehydration. This is the loss of too much water and other fluids from the body. When this occurs, body fluid must be replaced. This can be done with oral rehydration solutions. Oral rehydration solutions are available at drugstores and grocery stores without a prescription.  Home care  Follow all instructions given by your healthcare provider. Rest at home for the next 24 hours, or until you feel better. Avoid caffeine, tobacco, and alcohol. These can make diarrhea, cramping, and pain worse.  If taking medicines:    Don t take over-the-counter diarrhea or nausea medicines unless your healthcare provider tells you to.    You may use acetaminophen or NSAID medicines like ibuprofen or naproxen to reduce pain and fever. Don t use these if you have chronic liver or kidney disease, or ever had a stomach ulcer or gastrointestinal bleeding. Don't use NSAID medicines if you are already taking one for another condition (like arthritis) or are on daily  aspirin therapy (such as for heart disease or after a stroke). Talk with your healthcare provider first.    If antibiotics were prescribed, be sure you take them until they are finished. Don t stop taking them even when you feel better. Antibiotics must be taken as a full course.  To prevent the spread of illness:    Remember that washing with soap and water and using alcohol-based  is the best way to prevent the spread of infection.    Clean the toilet after each use.    Wash your hands before eating.    Wash your hands before and after preparing food. Keep in mind that people with diarrhea or vomiting should not prepare food for others.    Wash your hands after using cutting boards, countertops, and knives that have been in contact with raw foods.    Wash and then peel fruits and vegetables.    Keep uncooked meats away from cooked and ready-to-eat foods.    Use a food thermometer when cooking. Cook poultry to at least 165 F (74 C). Cook ground meat (beef, veal, pork, lamb) to at least 160 F (71 C). Cook fresh beef, veal, lamb, and pork to at least 145 F (63 C).    Don t eat raw or undercooked eggs (poached or rambo side up), poultry, meat, or unpasteurized milk and juices.  Food and drinks  The main goal while treating vomiting or diarrhea is to prevent dehydration. This is done by taking small amounts of liquids often.    Keep in mind that liquids are more important than food right now.    Drink only small amounts of liquids at a time.    Don t force yourself to eat, especially if you are having cramping, vomiting, or diarrhea. Don t eat large amounts at a time, even if you are hungry.    If you eat, avoid fatty, greasy, spicy, or fried foods.    Don t eat dairy foods or drink milk if you have diarrhea. These can make diarrhea worse.  During the first 24 hours you can try:    Oral rehydration solutions. Do not use sports drinks. They have too much sugar and not enough electrolytes.    Soft drinks without  caffeine    Ginger ale    Water (plain or flavored)    Decaf tea or coffee    Clear broth, consommé, or bouillon    Gelatin, popsicles, or frozen fruit juice bars  The second 24 hours, if you are feeling better, you can add:    Hot cereal, plain toast, bread, rolls, or crackers    Plain noodles, rice, mashed potatoes, chicken noodle soup, or rice soup    Unsweetened canned fruit (no pineapple)    Bananas  As you recover:    Limit fat intake to less than 15 grams per day. Don t eat margarine, butter, oils, mayonnaise, sauces, gravies, fried foods, peanut butter, meat, poultry, or fish.    Limit fiber. Don t eat raw or cooked vegetables, fresh fruits except bananas, or bran cereals.    Limit caffeine and chocolate.    Limit dairy.    Don t use spices or seasonings except salt.    Go back to your normal diet over time, as you feel better and your symptoms improve.    If the symptoms come back, go back to a simple diet or clear liquids.  Follow-up care  Follow up with your healthcare provider, or as advised. If a stool sample was taken or cultures were done, call the healthcare provider for the results as instructed.  Call 911  Call 911 if you have any of these symptoms:    Trouble breathing    Confusion    Extreme drowsiness or trouble walking    Loss of consciousness    Rapid heart rate    Chest pain    Stiff neck    Seizure  When to seek medical advice  Call your healthcare provider right away if any of these occur:    Abdominal pain that gets worse    Constant lower right abdominal pain    Continued vomiting and inability to keep liquids down    Diarrhea more than 5 times a day    Blood in vomit or stool    Dark urine or no urine for 8 hours, dry mouth and tongue, tiredness, weakness, or dizziness    Drowsiness    New rash    You don t get better in 2 to 3 days    Fever of 100.4 F (38 C) or higher, or as directed by your healthcare provider  Date Last Reviewed: 1/3/2016    4648-6453 [employer] [address]  [place] [address]. All rights reserved. This information is not intended as a substitute for professional medical care. Always follow your healthcare professional's instructions.      I would highly recommend starting a probiotic, such as Culturelle, twice daily until this resolves.  If you continue to have loose stools please obtain a sample and bring them to lab.  Make sure you are staying well-hydrated.  Ibuprofen/Tylenol for stomach cramping.

## 2018-06-26 LAB
BACTERIA SPEC CULT: NO GROWTH
SPECIMEN SOURCE: NORMAL

## 2018-12-02 ENCOUNTER — HOSPITAL ENCOUNTER (EMERGENCY)
Facility: CLINIC | Age: 48
Discharge: HOME | End: 2018-12-02
Attending: EMERGENCY MEDICINE | Admitting: EMERGENCY MEDICINE
Payer: COMMERCIAL

## 2018-12-02 VITALS
DIASTOLIC BLOOD PRESSURE: 89 MMHG | SYSTOLIC BLOOD PRESSURE: 135 MMHG | WEIGHT: 155 LBS | RESPIRATION RATE: 16 BRPM | HEIGHT: 72 IN | OXYGEN SATURATION: 99 % | BODY MASS INDEX: 20.99 KG/M2 | TEMPERATURE: 98.9 F

## 2018-12-02 DIAGNOSIS — S63.502D SPRAIN OF LEFT WRIST, SUBSEQUENT ENCOUNTER: ICD-10-CM

## 2018-12-02 DIAGNOSIS — I73.9 VASOSPASM (H): ICD-10-CM

## 2018-12-02 PROCEDURE — 99284 EMERGENCY DEPT VISIT MOD MDM: CPT | Mod: Z6 | Performed by: EMERGENCY MEDICINE

## 2018-12-02 PROCEDURE — 99282 EMERGENCY DEPT VISIT SF MDM: CPT | Performed by: NURSE PRACTITIONER

## 2018-12-02 NOTE — ED PROVIDER NOTES
History     Chief Complaint   Patient presents with     Hand Injury     The history is provided by the patient.     Jennifer Hoffman is a 48 year old female who presents to the ED complaining of a left arm injury. The patient had a fall down four stairs on Friday, injuring her left wrist region. She visited the [place] ED that day, the xray to her hand and wrist was negative. She has had bruising and swelling to that region since the injury and has marked the bruising to have gone farther up her forearm and now into her hand and fingers. About 30-40 minutes prior to arrival, she noticed an increase in pain to the hand and pallor coloring to her fingertips. She has developed progressively worsening numbness to all 4 fingers sine morning to the point where she is unable to feel anything with the fingers. She has noticed all fingers are cold despite her last icing being 1 1/2 hours ago. She denies previous injury to this arm. She has no chronic health problems. Patient has no other complains. She has iced and heated the hand with minimal relief.    Problem List:    Patient Active Problem List    Diagnosis Date Noted     Chemical dependency (H) 04/06/2015     Priority: Medium     Chronic low back pain 10/24/2014     Priority: Medium     Nayan UREÑA at [facility].        CARDIOVASCULAR SCREENING; LDL GOAL LESS THAN 100 10/31/2010     Priority: Medium     Idiopathic cardiomyopathy (H) 07/20/2010     Priority: Medium     Major depressive disorder, single episode, severe (H) 11/20/2006     Priority: Medium     Stable on Effexor and Trazodone  Problem list name updated by automated process. Provider to review          Past Medical History:    Past Medical History:   Diagnosis Date     Anemia, unspecified      Anxiety state, unspecified      Apnea      Contact dermatitis and other eczema due to drugs and medicines in contact with skin 05/16/2007     Contact dermatitis and other eczema due to plants (except  food)      Depressive disorder, not elsewhere classified      Depressive disorder, not elsewhere classified 11/09/08     Displacement of lumbar intervertebral disc without myelopathy      Duodenitis with hemorrhage 11/09/08     Erythema multiforme 07/22/2005     Lumbago 02/20/10     Myocardial infarction (H) 2010     Other pulmonary insufficiency, not elsewhere classified      Poisoning and toxic reactions caused by other plants      Pulmonary insufficiency following trauma and surgery 05/28/06     Syncope 7/7/10     Syncope and collapse        Past Surgical History:    Past Surgical History:   Procedure Laterality Date     C LUMBAR SPINE FUSION,ANTER APPRCH  2012    L4 and L5 to S1     HC DEBRIDE SKIN/SUBQ TISSUE  11/16/09    Post-op wound     HC INJ EPIDURAL LUMBAR/SACRAL W/WO CONTRAST  2009    Left L4-5     HC LAP,FULGURATE/EXCISE LESIONS  02/27/2007    Dx lap, fulguration of endometriosis.  [facility]     HC REMOVAL OF TONSILS,<11 Y/O  Age 7 or 8     HC TOOTH EXTRACTION W/FORCEP  Age 17    [place] teeth extraction x4.      UGI ENDOSCOPY, SIMPLE EXAM  11/08/08     HYSTERECTOMY      approx 2008 - ovaries still in, unsure about cervix. no cancer. had fibroids.     HYSTERECTOMY, PAP NO LONGER INDICATED       LAMINECT/DISCECTOMY, LUMBAR  10/24/09     TUBAL LIGATION  02/27/2007    [facility]       Family History:    Family History   Problem Relation Age of Onset     Anxiety Disorder Father      Anxiety Disorder Brother      Bipolar Disorder Sister      Suicide Paternal Uncle      Heart Failure Mother      Heart Failure Father      Breast Cancer Maternal Aunt      Cancer Maternal Grandfather      stomach     Cancer Paternal Grandmother      stomach     Depression Father      Alcohol/Drug Father      Depression Brother        Social History:  Marital Status:   [4]  Social History   Substance Use Topics     Smoking status: Never Smoker     Smokeless tobacco: Never Used     Alcohol use Yes      Comment: socially         Medications:      EPINEPHrine (EPIPEN) 0.3 MG/0.3ML injection   EPINEPHrine (EPIPEN/ADRENACLICK/OR ANY BX GENERIC EQUIV) 0.3 MG/0.3ML injection 2-pack   Fexofenadine HCl (ALLEGRA PO)   traZODone (DESYREL) 100 MG tablet   venlafaxine (EFFEXOR-XR) 150 MG 24 hr capsule         Review of Systems   All other systems reviewed and are negative.      Physical Exam   BP: 135/89  Heart Rate: 90  Temp: 98.9  F (37.2  C)  Resp: 16  Height: 182.9 cm (6')  Weight: 68 kg (150 lb)  SpO2: 99 %      Physical Exam   Constitutional: She is oriented to person, place, and time. She appears well-developed and well-nourished.   HENT:   Head: Normocephalic and atraumatic.   Eyes: Conjunctivae and EOM are normal.   Neck: Normal range of motion. Neck supple.   Cardiovascular: Normal rate, regular rhythm, normal heart sounds and intact distal pulses.    Palpable left radial pulse.   Pulmonary/Chest: Effort normal and breath sounds normal. No respiratory distress.   Abdominal: Soft. Bowel sounds are normal. There is no tenderness.   Musculoskeletal: Normal range of motion. She exhibits no deformity.   Neurological: She is alert and oriented to person, place, and time.   Skin: Skin is warm. No rash noted.   Ecchymosis to the left hand through the PIP joint. Pallor to all fingers. All fingers have good capillary refill. Ecchymosis and swelling about the distal forearm.   Psychiatric: She has a normal mood and affect. Her behavior is normal.   Nursing note and vitals reviewed.      ED Course     ED Course     Procedures                   No results found for this or any previous visit (from the past 24 hour(s)).    Medications - No data to display    Assessments & Plan (with Medical Decision Making)  Jennifer Hoffman is a 48 year old female who presents to the ED complaining of a left arm injury from a fall on Friday. X-ray reports from Friday at [facility] were negative. Vitals were reviewed and are unremarkable. Upon examination, she  exhibits ecchymosis to the left hand through the PIP joint, pallor to all fingers, normal capillary refill to all fingers, and ecchymosis and swelling about the distal forearm. She has a palpable left radial pulse. Exam is otherwise normal.  Sensation and color improved with a warm pack.  Also seen in consultation by orthopedist, Dr. Leo.   He felt this was vasospasm related to her baseline Raynaud's disease.  Referred to sports medicine if not improving over the next 3-4 days.  Return anytime sooner to the emergency department condition worsens or other concern.     I have reviewed the nursing notes.    I have reviewed the findings, diagnosis, plan and need for follow up with the patient.       Discharge Medication List as of 12/2/2018  4:30 PM          Final diagnoses:   Sprain of left wrist, subsequent encounter   Vasospasm (H)     This document serves as a record of services personally performed by Tan Mcgraw MD. It was created on their behalf by Yaw Lock, a trained medical scribe. The creation of this record is based on the provider's personal observations and the statements of the patient. This document has been checked and approved by the attending provider.    Note: Chart documentation done in part with Dragon Voice Recognition software. Although reviewed after completion, some word and grammatical errors may remain.    12/2/2018   [facility] EMERGENCY DEPARTMENT     Tan Mcgraw MD  12/02/18 3105

## 2018-12-02 NOTE — DISCHARGE INSTRUCTIONS
Wrist Sprain  A sprain is an injury to the ligaments or capsule that holds a joint together. There are no broken bones. Most sprains take about 3 to 6 weeks to heal. If it a severe sprain where the ligament is completely torn, it can take months to recover.  Most wrist sprains are treated with a splint, wrist brace, or elastic wrap for support. Severe sprains may require surgery.  Home care    Keep your arm elevated to reduce pain and swelling. This is very important during the first 48 hours.    Apply an ice pack over the injured area for 15 to 20 minutes every 3 to 6 hours. You should do this for the first 24 to 48 hours. You can make an ice pack by filling a plastic bag that seals at the top with ice cubes and then wrapping it with a thin towel. Continue to use ice packs for relief of pain and swelling as needed. As the ice melts, be careful to avoid getting your wrap, splint, or cast wet. After 48 hours, apply heat (warm shower or warm bath) for 15 to 20 minutes several times a day, or alternate ice and heat.     You may use over-the-counter pain medicine to control pain, unless another pain medicine was prescribed. If you have chronic liver or kidney disease or ever had a stomach ulcer or gastrointestinal bleeding, talk with your doctor before using these medicines.    If you were given a splint or brace, wear it for the time advised by your doctor.  Follow-up care  Follow up with your healthcare provider, or as advised. Any X-rays you had today don t show any broken bones, breaks, or fractures. Sometimes fractures don t show up on the first X-ray. Bruises and sprains can sometimes hurt as much as a fracture. These injuries can take time to heal completely. If your symptoms don t improve or they get worse, talk with your doctor. You may need a repeat X-ray. If X-rays were taken, you will be told of any new findings that may affect your care.  When to seek medical advice  Call your healthcare provider right  away if any of these occur:    Pain or swelling increases    Fingers or hand becomes cold, blue, numb, or tingly   Date Last Reviewed: 5/1/2018 2000-2018 [employer]. [address] [place] [address], [place] [address]. All rights reserved. This information is not intended as a substitute for professional medical care. Always follow your healthcare professional's instructions.

## 2018-12-02 NOTE — ED AVS SNAPSHOT
[facility] Emergency Department    [address] [place] [address]    Phone:  [phone]    Fax:  [phone]                                       Jennifer Hoffman   MRN: [id number]    Department:  [facility] Emergency Department   Date of Visit:  12/2/2018           After Visit Summary Signature Page     I have received my discharge instructions, and my questions have been answered. I have discussed any challenges I see with this plan with the nurse or doctor.    ..........................................................................................................................................  Patient/Patient Representative Signature      ..........................................................................................................................................  Patient Representative Print Name and Relationship to Patient    ..................................................               ................................................  Date                                   Time    ..........................................................................................................................................  Reviewed by Signature/Title    ...................................................              ..............................................  Date                                               Time          22EPIC Rev 08/18

## 2018-12-02 NOTE — ED TRIAGE NOTES
Pt had fall down four stairs on Friday.  Seen at [place] [facility] that day.  Approx 30-40 minutes ago pt had sudden onset of numbness in fingers, tingling in palm, and increased pain in wrist and forearm.  Pt reports increased discoloration.  MD Lucien notified.

## 2018-12-02 NOTE — ED AVS SNAPSHOT
[facility] Emergency Department    [address]     [place] [address]    Phone:  [phone]    Fax:  [phone]                                       Jennifer Hoffman   MRN: [id number]    Department:  [facility] Emergency Department   Date of Visit:  12/2/2018           Patient Information     Date Of Birth          1970        Your diagnoses for this visit were:     Sprain of left wrist, subsequent encounter     Vasospasm (H)        You were seen by Tan Mcgraw MD.      Follow-up Information     Follow up with Yolanda Mendoza MD.    Specialty:  Family Medicine - Sports Medicine    Why:  If not improving in 3 days    Contact information:    [address] [facility] [address]  [facility] [address]  [phone]          Discharge Instructions         Wrist Sprain  A sprain is an injury to the ligaments or capsule that holds a joint together. There are no broken bones. Most sprains take about 3 to 6 weeks to heal. If it a severe sprain where the ligament is completely torn, it can take months to recover.  Most wrist sprains are treated with a splint, wrist brace, or elastic wrap for support. Severe sprains may require surgery.  Home care    Keep your arm elevated to reduce pain and swelling. This is very important during the first 48 hours.    Apply an ice pack over the injured area for 15 to 20 minutes every 3 to 6 hours. You should do this for the first 24 to 48 hours. You can make an ice pack by filling a plastic bag that seals at the top with ice cubes and then wrapping it with a thin towel. Continue to use ice packs for relief of pain and swelling as needed. As the ice melts, be careful to avoid getting your wrap, splint, or cast wet. After 48 hours, apply heat (warm shower or warm bath) for 15 to 20 minutes several times a day, or alternate ice and heat.     You may use over-the-counter pain medicine to control pain, unless another pain medicine was prescribed. If you have  chronic liver or kidney disease or ever had a stomach ulcer or gastrointestinal bleeding, talk with your doctor before using these medicines.    If you were given a splint or brace, wear it for the time advised by your doctor.  Follow-up care  Follow up with your healthcare provider, or as advised. Any X-rays you had today don t show any broken bones, breaks, or fractures. Sometimes fractures don t show up on the first X-ray. Bruises and sprains can sometimes hurt as much as a fracture. These injuries can take time to heal completely. If your symptoms don t improve or they get worse, talk with your doctor. You may need a repeat X-ray. If X-rays were taken, you will be told of any new findings that may affect your care.  When to seek medical advice  Call your healthcare provider right away if any of these occur:    Pain or swelling increases    Fingers or hand becomes cold, blue, numb, or tingly   Date Last Reviewed: 5/1/2018 2000-2018 [employer]. [address] [place] [address]. All rights reserved. This information is not intended as a substitute for professional medical care. Always follow your healthcare professional's instructions.          24 Hour Appointment Hotline       To make an appointment at any [facility], call [phone] ([phone]). If you don't have a family doctor or clinic, we will help you find one. [facility] clinics are conveniently located to serve the needs of you and your family.             Review of your medicines      Our records show that you are taking the medicines listed below. If these are incorrect, please call your family doctor or clinic.        Dose / Directions Last dose taken    ALLEGRA PO        Refills:  0        * EPINEPHrine 0.3 MG/0.3ML injection   Commonly known as:  EPIPEN   Dose:  0.3 mg   Quantity:  3 each        Inject 0.3 mLs into the muscle once as needed for anaphylaxis for 1 dose.   Refills:  1        * EPINEPHrine 0.3  MG/0.3ML injection 2-pack   Commonly known as:  EPIPEN/ADRENACLICK/or ANY BX GENERIC EQUIV   Dose:  0.3 mg   Quantity:  0.6 mL        Inject 0.3 mLs (0.3 mg) into the muscle as needed for anaphylaxis   Refills:  1        traZODone 100 MG tablet   Commonly known as:  DESYREL   Dose:   mg   Quantity:  90 tablet        Take 0.5-1 tablets ( mg) by mouth At Bedtime   Refills:  1        venlafaxine 150 MG 24 hr capsule   Commonly known as:  EFFEXOR-XR   Dose:  150 mg   Quantity:  93 capsule        Take 1 capsule (150 mg) by mouth daily   Refills:  3        * Notice:  This list has 2 medication(s) that are the same as other medications prescribed for you. Read the directions carefully, and ask your doctor or other care provider to review them with you.            Orders Needing Specimen Collection     None      Pending Results     No orders found from 11/30/2018 to 12/3/2018.            Pending Culture Results     No orders found from 11/30/2018 to 12/3/2018.            Pending Results Instructions     If you had any lab results that were not finalized at the time of your Discharge, you can call the ED Lab Result RN at [phone]. You will be contacted by this team for any positive Lab results or changes in treatment. The nurses are available 7 days a week from 10A to 6:30P.  You can leave a message 24 hours per day and they will return your call.        Thank you for choosing [facility]       Thank you for choosing [facility] for your care. Our goal is always to provide you with excellent care. Hearing back from our patients is one way we can continue to improve our services. Please take a few minutes to complete the written survey that you may receive in the mail after you visit with us. Thank you!        [employer] Information     [employer] gives you secure access to your electronic health record. If you see a primary care provider, you can also send messages to your care team and make appointments. If you have  questions, please call your primary care clinic.  If you do not have a primary care provider, please call [phone] and they will assist you.        Care EveryWhere ID     This is your Care EveryWhere ID. This could be used by other organizations to access your [facility] medical records  [id number]        Equal Access to Services     STORMY TYLER : Lilliam Johnson, waimelda bailey, allison chatamnaldelmi cortez, rudi lozada. So [facility] [phone].    ATENCIÓN: Si habla español, tiene a gorman disposición servicios gratuitos de asistencia lingüística. Llame al [phone].    We comply with applicable federal civil rights laws and [place] laws. We do not discriminate on the basis of race, color, national origin, age, disability, sex, sexual orientation, or gender identity.            After Visit Summary       This is your record. Keep this with you and show to your community pharmacist(s) and doctor(s) at your next visit.

## 2018-12-31 ENCOUNTER — MYC REFILL (OUTPATIENT)
Dept: FAMILY MEDICINE | Facility: CLINIC | Age: 48
End: 2018-12-31

## 2018-12-31 DIAGNOSIS — G47.00 PERSISTENT INSOMNIA: ICD-10-CM

## 2018-12-31 DIAGNOSIS — F32.2 MAJOR DEPRESSIVE DISORDER, SINGLE EPISODE, SEVERE WITHOUT PSYCHOTIC FEATURES (H): ICD-10-CM

## 2018-12-31 NOTE — TELEPHONE ENCOUNTER
Jennifer calling wanting to update the pharmacy. Had been sending to [facility] through mail order. They would like them sent now through [facility] in [place].

## 2019-01-02 RX ORDER — VENLAFAXINE HYDROCHLORIDE 150 MG/1
150 CAPSULE, EXTENDED RELEASE ORAL DAILY
Qty: 60 CAPSULE | Refills: 0 | Status: SHIPPED | OUTPATIENT
Start: 2019-01-02 | End: 2019-01-08

## 2019-01-02 RX ORDER — TRAZODONE HYDROCHLORIDE 100 MG/1
50-100 TABLET ORAL AT BEDTIME
Qty: 60 TABLET | Refills: 0 | Status: SHIPPED | OUTPATIENT
Start: 2019-01-02 | End: 2019-01-08

## 2019-01-02 NOTE — TELEPHONE ENCOUNTER
"Requested Prescriptions   Pending Prescriptions Disp Refills     venlafaxine (EFFEXOR-XR) 150 MG 24 hr capsule 93 capsule 3    Last Written Prescription Date:  10/4/17  Last Fill Quantity: 93,  # refills: 3   Last office visit: 10/4/2017 with prescribing provider:     Future Office Visit:   Next 5 appointments (look out 90 days)    Feb 26, 2019  2:00 PM CST  MyChart Long with Marni Montemayor MD  [facility] ([address] [address] [phone]          Sig: Take 1 capsule (150 mg) by mouth daily    Serotonin-Norepinephrine Reuptake Inhibitors  Failed - 12/31/2018  3:31 PM       Failed - PHQ-9 score of less than 5 in past 6 months    Please review last PHQ-9 score.   PHQ-9 SCORE 5/4/2015 1/22/2016 10/4/2017   PHQ-9 Total Score 9 - -   PHQ-9 Total Score - 0 1            Failed - Recent (6 mo) or future (30 days) visit within the authorizing provider's specialty    Patient had office visit in the last 6 months or has a visit in the next 30 days with authorizing provider or within the authorizing provider's specialty.  See \"Patient Info\" tab in inbasket, or \"Choose Columns\" in Meds & Orders section of the refill encounter.           Passed - Blood pressure under 140/90 in past 12 months    BP Readings from Last 3 Encounters:   12/02/18 135/89   06/24/18 113/73   11/26/17 123/60                Passed - Patient is age 18 or older       Passed - No active pregnancy on record       Passed - Normal serum creatinine on file in past 12 months    Recent Labs   Lab Test 06/24/18  2309   CR 0.74            Passed - No positive pregnancy test in past 12 months        traZODone (DESYREL) 100 MG tablet 90 tablet 1    Last Written Prescription Date:  4/16/18  Last Fill Quantity: 90,  # refills: 1   Last office visit: 10/4/2017 with prescribing provider:     Future Office Visit:   Next 5 appointments (look out 90 days)    Feb 26, 2019  2:00 PM CST  Suman" "[facility] with Marn[facility] MD  [facility] [address] [facility] [address] [phone]          Sig: Take 0.5-1 tablets ( mg) by mouth At Bedtime    Serotonin Modulators Failed - 12/31/2018  3:31 PM       Failed - Recent (12 mo) or future (30 days) visit within the authorizing provider's specialty    Patient had office visit in the last 12 months or has a visit in the next 30 days with authorizing provider or within the authorizing provider's specialty.  See \"Patient Info\" tab in inbasket, or \"Choose Columns\" in Meds & Orders section of the refill encounter.             Passed - Patient is age 18 or older       Passed - No active pregnancy on record       Passed - No positive pregnancy test in past 12 months        Routing refill request to provider for review/approval because:  A break in medication  Patient needs to be seen because it has been more than 1 year since last office visit.  T'd up enough to get to her appointment on 2/26/19.  Lynda Winters, VANN, RN          "

## 2019-01-07 NOTE — TELEPHONE ENCOUNTER
Pt calling to check the status of this refill. She would like it re-sent to [facility] [place], not mail order. Also, re-send the Trazodone. She will be out tomorrow.   Thank you,  Brandi Martin- Pt Rep.

## 2019-01-08 RX ORDER — VENLAFAXINE HYDROCHLORIDE 150 MG/1
150 CAPSULE, EXTENDED RELEASE ORAL DAILY
Qty: 60 CAPSULE | Refills: 0 | Status: SHIPPED | OUTPATIENT
Start: 2019-01-08 | End: 2019-02-26

## 2019-01-08 RX ORDER — TRAZODONE HYDROCHLORIDE 100 MG/1
50-100 TABLET ORAL AT BEDTIME
Qty: 60 TABLET | Refills: 0 | Status: SHIPPED | OUTPATIENT
Start: 2019-01-08 | End: 2019-02-26

## 2019-01-08 NOTE — TELEPHONE ENCOUNTER
Please send a new prescrption for the effexor and trazodone to the [facility] pharmacy in [place].  Thank you.     Fatuma Dai, CMA

## 2019-02-26 ENCOUNTER — OFFICE VISIT (OUTPATIENT)
Dept: FAMILY MEDICINE | Facility: CLINIC | Age: 49
End: 2019-02-26
Payer: COMMERCIAL

## 2019-02-26 VITALS
HEIGHT: 72 IN | HEART RATE: 99 BPM | DIASTOLIC BLOOD PRESSURE: 76 MMHG | SYSTOLIC BLOOD PRESSURE: 126 MMHG | TEMPERATURE: 98.1 F | WEIGHT: 155.6 LBS | BODY MASS INDEX: 21.08 KG/M2 | OXYGEN SATURATION: 98 % | RESPIRATION RATE: 18 BRPM

## 2019-02-26 DIAGNOSIS — F32.2 MAJOR DEPRESSIVE DISORDER, SINGLE EPISODE, SEVERE WITHOUT PSYCHOTIC FEATURES (H): Primary | ICD-10-CM

## 2019-02-26 DIAGNOSIS — G47.00 PERSISTENT INSOMNIA: ICD-10-CM

## 2019-02-26 DIAGNOSIS — M25.532 LEFT WRIST PAIN: ICD-10-CM

## 2019-02-26 PROCEDURE — 99214 OFFICE O/P EST MOD 30 MIN: CPT | Performed by: FAMILY MEDICINE

## 2019-02-26 RX ORDER — VENLAFAXINE HYDROCHLORIDE 150 MG/1
150 CAPSULE, EXTENDED RELEASE ORAL DAILY
Qty: 90 CAPSULE | Refills: 3 | Status: SHIPPED | OUTPATIENT
Start: 2019-02-26 | End: 2020-02-10

## 2019-02-26 RX ORDER — TRAZODONE HYDROCHLORIDE 100 MG/1
50-100 TABLET ORAL AT BEDTIME
Qty: 90 TABLET | Refills: 3 | Status: SHIPPED | OUTPATIENT
Start: 2019-02-26 | End: 2019-06-19

## 2019-02-26 ASSESSMENT — PATIENT HEALTH QUESTIONNAIRE - PHQ9
SUM OF ALL RESPONSES TO PHQ QUESTIONS 1-9: 0
5. POOR APPETITE OR OVEREATING: SEVERAL DAYS

## 2019-02-26 ASSESSMENT — ANXIETY QUESTIONNAIRES
1. FEELING NERVOUS, ANXIOUS, OR ON EDGE: MORE THAN HALF THE DAYS
2. NOT BEING ABLE TO STOP OR CONTROL WORRYING: SEVERAL DAYS
7. FEELING AFRAID AS IF SOMETHING AWFUL MIGHT HAPPEN: NOT AT ALL
6. BECOMING EASILY ANNOYED OR IRRITABLE: NOT AT ALL
3. WORRYING TOO MUCH ABOUT DIFFERENT THINGS: SEVERAL DAYS
GAD7 TOTAL SCORE: 5
IF YOU CHECKED OFF ANY PROBLEMS ON THIS QUESTIONNAIRE, HOW DIFFICULT HAVE THESE PROBLEMS MADE IT FOR YOU TO DO YOUR WORK, TAKE CARE OF THINGS AT HOME, OR GET ALONG WITH OTHER PEOPLE: SOMEWHAT DIFFICULT
5. BEING SO RESTLESS THAT IT IS HARD TO SIT STILL: NOT AT ALL

## 2019-02-26 ASSESSMENT — MIFFLIN-ST. JEOR: SCORE: 1457.93

## 2019-02-26 ASSESSMENT — PAIN SCALES - GENERAL: PAINLEVEL: NO PAIN (0)

## 2019-02-27 ASSESSMENT — ANXIETY QUESTIONNAIRES: GAD7 TOTAL SCORE: 5

## 2019-02-28 NOTE — PROGRESS NOTES
Visit Date:   02/26/2019      SUBJECTIVE:  Jennifer is here to follow up on her medications.  Her last visit with me was back in 10/2017.  She does have another family practice provider that she sees for her routine full physicals.  She is currently taking trazodone for sleep and she normally takes half a pill every night.  Sometimes if she wakes up after a few hours she will take another quarter to half a tablet.  She is also taking Effexor 150 mg daily.  She finds that is helpful.  She is still struggling with many issues with her family at home.  She is doing some counseling.  She has had a lot of issues recently with her daughter who is a teenager and has had depression and suicidal behaviors, which has been quite a stress on Jennifer.  She has a blended family and her ex has not always followed the same parenting rules that Jennifer has, and so this has created some division between them, and her daughter has manipulated them to her benefit.  More recently, they have been communicating better and through therapy they have learned to co-parent more effectively and this has helped quite a bit.  Jennifer still worries a lot about her daughter's safety and this clearly affects her anxiety levels, but she feels she is doing everything she can at this time.      Her other concern is left wrist pain.  In November, she fell and landed on her left wrist.  She had a significant Rogers neck type deformity of the left wrist and thought for sure that her arm was broken.  She was seen and on 2 occasions had it x-rayed, and both times it was read as normal without fracture.  She did some home exercises that she learned from her son's arm injury and it has gotten a lot better and the deformity has decreased, but her range of motion is still limited and she has some pain there.  She is wondering what more can be done.      Please see Epic for her histories, which were all reviewed today.  Her medication list was reviewed as well.  Review  of systems is otherwise negative, except as noted above.      OBJECTIVE:   VITAL SIGNS:  Noted.   GENERAL:  The patient is alert, oriented and in no acute distress.  She is pleasant, neatly dressed and well groomed.  She makes good eye contact and has normal speech and affect.  Her left wrist appears in good position.  She has a very slight prominence on the dorsal aspect of the distal radius.  She has slightly limited range of motion of the wrist in dorsiflexion and palmar flexion, but normal  strength and no obvious joint effusion.  No skin abnormalities.   CARDIOVASCULAR:  Regular rate and rhythm without murmur.   NECK:  Supple without lymphadenopathy or thyromegaly.   LUNGS:  Clear to auscultation bilaterally.      ASSESSMENT:   1.  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   2.  Persistent insomnia.   3.  Left wrist pain.      PLAN:  I did agree to refill her medications without changes in the doses today.  She will continue working with her counselor and continue working through family therapy as well.  We talked a lot about stresses that children face and ways to maintain their safety at home.  In addition, I placed an occupational therapy referral for her left wrist.  Looking at the pictures of her wrist from November, I am amazed that it was not broken, but I am unable to view x-rays from that time because they were done elsewhere.  I was able to review the reports from both x-rays and they both state no fracture seen.  The deformity does seem clinically better compared to the picture she showed me, but we can hopefully improve her function and decrease her pain with some occupational therapy.  I told her I would like to see her yearly for medication followup and I would see her more often as needed, but she does have another provider she sees on a regular basis for routine health care.         SAFIA CHRISTIAN MD             D: 02/28/2019   T:  2019   MT: BRIDGETTE      Name:     TAMICA RITTER   MRN:      [id number]        Account:      [id number]   :      1970           Visit Date:   2019      Document: [id number]

## 2019-03-13 ENCOUNTER — HOSPITAL ENCOUNTER (OUTPATIENT)
Dept: OCCUPATIONAL THERAPY | Facility: CLINIC | Age: 49
Setting detail: THERAPIES SERIES
End: 2019-03-13
Attending: FAMILY MEDICINE
Payer: COMMERCIAL

## 2019-03-13 DIAGNOSIS — M25.532 LEFT WRIST PAIN: ICD-10-CM

## 2019-03-13 PROCEDURE — 97535 SELF CARE MNGMENT TRAINING: CPT | Mod: GO

## 2019-03-13 PROCEDURE — 97110 THERAPEUTIC EXERCISES: CPT | Mod: GO

## 2019-03-13 PROCEDURE — 97167 OT EVAL HIGH COMPLEX 60 MIN: CPT | Mod: GO

## 2019-03-13 PROCEDURE — 97035 APP MDLTY 1+ULTRASOUND EA 15: CPT | Mod: GO

## 2019-03-14 NOTE — PROGRESS NOTES
03/13/19 1109   General Information/History   Start Of Care Date 03/13/19   Referring Physician Dr. Marni Montemayor   Orders Evaluate And Treat As Indicated   Orders Date 02/26/19   Medical Diagnosis Left wrist pain (M25.532)   Additional Occupational Profile Info/Pertinent history of current problem Patient is a 48 year old female who sustained trauma to her left wrist after a fall.  She was entering the garage and was tripped by her dog resulting in her falling down 3 stairs into an old .  She had a large hematoma, pain, and limited ROM presenting to the ED that evening.  Her x-rays were negative for a fracture.  She did return to the ED again 2 days later with worsening symptoms with numbness, tingling, and progressive pain.    Patient is very active enjoying going to the gym, cycling, and gardening.  Patient does have 3 children age range of 21-14 and 2 step children in their 20's.     Previous treatment or current condition Patient has had negative xrays for bone fracture/changes.  She did have a large hematoma that has disipated.  Limited ROM and strength in her hand that limits function.    Past medical history She has a prior medical history of depression, anxiety, chronic pain,  syncope, pulmonary insufficiency, myocardial infarction, duodenitis with hemorrhage, displacement of lumbar disc, apnea, and anemia.   How/Where did it occur With a fall   Onset date of current episode/exacerbation 11/30/18   Chronicity Chronic   Hand Dominance Right   Affected side Left   Functional limitations perform activities of daily living;perform required work activities   Reported Symptoms Pain;Loss of Motion/Stiffness;Loss of strength;Tingling;Numbness   Prior level of function Independent ADL;Independent IADL   Important Activities Lift weights/cycling, work    Level of functions comments Patient reports those are the most limiting.    Patient role/Employment history Employed   Occupation Computer     Employment Status Working in normal job with restrictions  (modifies job to be able to complete)   Primary Job Tasks Keyboarding;Prolonged sitting;Repetitive tasks;Using a mouse   Patient/Family goals statement Patient would like less pain and more motion to be able to complete work and leisure tasks.    General observations 67/80 UEFI   Fall Risk Screen   Fall screen completed by OT   Have you fallen 2 or more times in the past year? No   Have you fallen and had an injury in the past year? Yes   Is patient a fall risk? Department fall risk interventions implemented   Fall screen comments Patient tripped over her dog falling down 3 stairs.    ROM   ROM AROM   AROM   AROM Thumb;Wrist   Wrist   Wrist Extension - Left 120   Wrist Flexion- Left 33   Radial Deviation- Left 15   Ulnar Deviation- Left 42   Strength   Strength Strength    Avg - Left 44    Avg - Right 86   Lateral Pinch - Left 10  (pain)   Lateral Pinch - Right 17   3 Point Pinch - Left 12  (pain in wrist)   3 Point Pinch - Right 25   3 Point Pinch Comments 9-hole peg test L 1)27 seconds 2) 19 seconds   Therapy Interventions   Planned Therapy Interventions Ultrasound;Light Therapy;Paraffin;Strengthening;ROM;Stretching;Splinting;Manual Therapy;Education of splint wear, care, fit and precautions;Desensitization;Self Care/Home Management;Ergonomic Patient Education;Joint Protection Instruction;Home Program   Clinical Impression   Criteria for Skilled Therapeutic Interventions Met yes;treatment indicated   OT Diagnosis Decreased ability to complete BADL, IADL, work, and leisure activities due to limitations and pain.    Influenced by the following impairments Pain;Decreased range of motion;Decreased strength   Assessment of Occupational Performance 5 or more Performance Deficits   Identified Performance Deficits dressing, bathing, work, cycling, home management,    Clinical Decision Making (Complexity) High complexity   Therapy Frequency  1x/week   Predicted Duration of Therapy Intervention (days/wks) 8 weeks   Risks and Benefits of Treatment have been explained. Yes   Patient, Family & other staff in agreement with plan of care Yes   Hand Goals   Hand Goals Bathing;Dressing;Household Chores;Work;Sports/Recreation   Bathing   Current Functional Task Squeezing;Holding   Previous Performance Level Independent   Current Performance Level Mild difficulty   Goal Target Task Squeeze shampoo bottle;Hold shampoo/conditioner in palm   Goal Target Performance Level No difficulty   Due date 05/10/19   Dressing   Current Functional Task Dressing LB;Buttoning;Snapping   Previous Performance Level Independent   Current Performance Level Mild difficulty   Goal Target Task Don/Doff pants;Botton pants   Goal Target Performance Level Mild difficulty   Due Date 05/10/19   Household Chores   Current Functional Task Vacuuming;Sweeping;Gripping   Previous Performance Level Independent   Current Performance Level Moderate difficulty   Goal Target Task Sweep floors;Hold and use vacuum ;Carry a shopping bag   Goal Target Performance Level No difficulty   Due Date 05/10/19   Work   Current Functional Task Computer use;Keyboarding;Mousing;Repetitive tasks   Previous Performance Level Independent   Current Performance Level Moderate difficulty   Goal Target Task Type;Mouse;Complete repetitive tasks   Goal Target Performance Level No difficulty   Due Date 05/10/19   Sports/Recreation   Current Functional Task Weight bearing;Holding;Bicycling;Gripping   Previous Performance Level Independent   Curent Performance Level Moderate difficulty   Goal Target Task  bar;Weight bear through hand;Hold &  handlebars   Goal Target Performance Level No difficulty   Due Date 05/10/19   Total Evaluation Time   OT Eval, High Complexity Minutes (92171) 26     SUDARSHAN Owen/L  [facility]  [phone]

## 2019-03-18 ENCOUNTER — HOSPITAL ENCOUNTER (OUTPATIENT)
Dept: OCCUPATIONAL THERAPY | Facility: CLINIC | Age: 49
Setting detail: THERAPIES SERIES
End: 2019-03-18
Attending: FAMILY MEDICINE
Payer: COMMERCIAL

## 2019-03-18 PROCEDURE — 97110 THERAPEUTIC EXERCISES: CPT | Mod: GO

## 2019-03-18 PROCEDURE — 97140 MANUAL THERAPY 1/> REGIONS: CPT | Mod: GO

## 2019-03-18 PROCEDURE — 97035 APP MDLTY 1+ULTRASOUND EA 15: CPT | Mod: GO

## 2019-03-26 ENCOUNTER — HOSPITAL ENCOUNTER (OUTPATIENT)
Dept: OCCUPATIONAL THERAPY | Facility: CLINIC | Age: 49
Setting detail: THERAPIES SERIES
End: 2019-03-26
Attending: FAMILY MEDICINE
Payer: COMMERCIAL

## 2019-03-26 PROCEDURE — 97140 MANUAL THERAPY 1/> REGIONS: CPT | Mod: GO

## 2019-03-26 PROCEDURE — 97018 PARAFFIN BATH THERAPY: CPT | Mod: GO

## 2019-03-26 PROCEDURE — 97535 SELF CARE MNGMENT TRAINING: CPT | Mod: GO

## 2019-03-26 PROCEDURE — 97110 THERAPEUTIC EXERCISES: CPT | Mod: GO

## 2019-04-09 ENCOUNTER — HOSPITAL ENCOUNTER (OUTPATIENT)
Dept: OCCUPATIONAL THERAPY | Facility: CLINIC | Age: 49
Setting detail: THERAPIES SERIES
End: 2019-04-09
Attending: FAMILY MEDICINE
Payer: COMMERCIAL

## 2019-04-09 PROCEDURE — 97110 THERAPEUTIC EXERCISES: CPT | Mod: GO

## 2019-04-09 PROCEDURE — 97140 MANUAL THERAPY 1/> REGIONS: CPT | Mod: GO

## 2019-04-09 PROCEDURE — 97018 PARAFFIN BATH THERAPY: CPT | Mod: GO

## 2019-04-09 PROCEDURE — 97535 SELF CARE MNGMENT TRAINING: CPT | Mod: GO

## 2019-04-16 ENCOUNTER — HOSPITAL ENCOUNTER (OUTPATIENT)
Dept: OCCUPATIONAL THERAPY | Facility: CLINIC | Age: 49
Setting detail: THERAPIES SERIES
End: 2019-04-16
Attending: FAMILY MEDICINE
Payer: COMMERCIAL

## 2019-04-16 PROCEDURE — 97110 THERAPEUTIC EXERCISES: CPT | Mod: GO

## 2019-04-16 PROCEDURE — 97035 APP MDLTY 1+ULTRASOUND EA 15: CPT | Mod: GO

## 2019-04-16 PROCEDURE — 97140 MANUAL THERAPY 1/> REGIONS: CPT | Mod: GO

## 2019-04-23 ENCOUNTER — HOSPITAL ENCOUNTER (OUTPATIENT)
Dept: OCCUPATIONAL THERAPY | Facility: CLINIC | Age: 49
Setting detail: THERAPIES SERIES
End: 2019-04-23
Attending: FAMILY MEDICINE
Payer: COMMERCIAL

## 2019-04-23 ENCOUNTER — OFFICE VISIT (OUTPATIENT)
Dept: FAMILY MEDICINE | Facility: CLINIC | Age: 49
End: 2019-04-23
Payer: COMMERCIAL

## 2019-04-23 VITALS
DIASTOLIC BLOOD PRESSURE: 82 MMHG | TEMPERATURE: 97.3 F | HEART RATE: 68 BPM | BODY MASS INDEX: 20.65 KG/M2 | RESPIRATION RATE: 14 BRPM | OXYGEN SATURATION: 99 % | SYSTOLIC BLOOD PRESSURE: 118 MMHG | WEIGHT: 155 LBS

## 2019-04-23 DIAGNOSIS — M25.532 LEFT WRIST PAIN: Primary | ICD-10-CM

## 2019-04-23 PROCEDURE — 97018 PARAFFIN BATH THERAPY: CPT | Mod: GO

## 2019-04-23 PROCEDURE — 97140 MANUAL THERAPY 1/> REGIONS: CPT | Mod: GO

## 2019-04-23 PROCEDURE — 97110 THERAPEUTIC EXERCISES: CPT | Mod: GO

## 2019-04-23 PROCEDURE — 99213 OFFICE O/P EST LOW 20 MIN: CPT | Performed by: FAMILY MEDICINE

## 2019-04-23 PROCEDURE — 97535 SELF CARE MNGMENT TRAINING: CPT | Mod: GO

## 2019-04-23 ASSESSMENT — PAIN SCALES - GENERAL: PAINLEVEL: MILD PAIN (3)

## 2019-04-23 NOTE — PROGRESS NOTES
SUBJECTIVE:   Jennifer Reyes is a 48 year old female who presents to clinic today for the following   health issues:      Joint Pain    Onset: Nov    Description:   Location: left wrist  Character: Sharp and Dull ache    Intensity: mild    Progression of Symptoms: intermittent, mild improvement    Accompanying Signs & Symptoms:  Other symptoms: none    History:   Previous similar pain: no       Precipitating factors:   Trauma or overuse: no     Alleviating factors:  Improved by: nothing    Therapies Tried and outcome: PT x 5 times.    She was last seen by me in February, had injured her left wrist with a fall in November.     Additional history: she has been doing PT and notes slight improvement, but not enough.  She wants to be able to be active this summer. Her ROM is still very limited. She has her last PT appt later today, wondering what to do after that.     O:  Vitals noted.  Patient alert, oriented, and in no acute distress.     Her left wrist is still slightly deformed, shows a thickness along the dorsal aspect of the distal radius that could be consistent with a previous distal radius fracture.  Her volar flexion is limited to approximately 30 degrees, compared to about 45 on the right.  Her  strength is weaker on the left.  She has normal color and temperature. No edema.      A:      ICD-10-CM    1. Left wrist pain M25.532        P: I'd like to get an update from her occupational therapist.  She has her last appointment this afternoon and will ask for her therapist to send me an update.  If she is making progress then I recommend she continue OT longer.  If not I recommend she consider seeing an orthopedist for second opinion.  May need to do an MRI or other imaging to figure out why she is having so much difficulty.  The other option is to do nothing but that is not acceptable to her nor I.      Marni Montemayor MD

## 2019-04-24 NOTE — PROGRESS NOTES
04/23/19 1438   Notes   Note Type Progress Note   Signing Clinician's Name / Credentials   Signing clinician's name / credentials JAYLYN Owen   Session Number   Session Number 6   Authorization status Limited to 90 COMBINED (PT/OT/SLP) visits per CALENDAR YEAR, HARD MAX; 0 USED   Providers   Referring Physician Dr. Marni Montemayor   Progress Note/Recertification   Progress Note Due Date 05/10/19   General Information   Medical Diagnosis Left wrist pain (M25.532)   Orders Evaluate And Treat As Indicated   Start Of Care Date 03/13/19   Onset date of current episode/exacerbation 11/30/18   Subjective Measures   Subjective Patient reports resting has minimal pain and when doing things pain does increase.  She reports going for a run she will feel increased pain in the wrist to 2-3/10.  She stated she has not been cycling yet.     Initial Pain level 3/10   Current Pain level 1/10   Functional Improvement Reported Self care activities;Upper extremity weight bearing;Carrying   Objective Measures   Objective Measures Strength;ROM   ROM   Location (anatomical) Wrist   Location Left   Motion Ext/Flex   ROM Comments 140/62; Deviation Radial 28/ulnar 42    Strength   Location Left    34   Clark Pinch 11   Lateral Pinch 12   Modalities   Modalities Paraffin   Paraffin - Duration 15 min   Paraffin Minutes (17025) 14 Minutes   Skilled Interventions To Increase Blood Flow For Improved Healing;To Increase Tissue Extensibility;To Enhance Joint Rom;To Decrease Pain;To Decrease Inflammation   Location Left hand/wrist    Set Up dip 5 times and wrapped for time.     Therapeutic Exercise   Therapeutic Exercise ROM/AROM;Other Exercises/Activities   Skilled Interventions To Increase Blood Flow For Improved Healing;To Increase Strength;To Enhance Joint Rom;To Decrease Pain;To Decrease Inflammation   Therapeutic Procedure: strength, endurance, ROM, flexibillity minutes (16460) 10   Other Exer/Activities/Educ   Exercise 1  Reviewed HEP with further education on importance   Description 1 Patient reported increased pain with wrist rotation    Exercise 2 Strength and ROM measurements   Description 2 see above for results   Manual   Manual STM   Manual Therapy Minutes (47901) 15   Skilled Interventions To Increase Blood Flow For Improved Healing;To Increase Tissue Extensibility;To Enhance Tendon Gliding;To Enhance Tissue Repair;To Enhance Joint Rom;To Decrease Pain;To Decrease Hypersensitivity;To Decrease Inflammation   STM Thenars;Flexors;Tool Assisted   Position Sitting   Description/ Technique Light-medium pressure longitudinal distal to proximal, CW, CCW    Self Care/Home Management   Self-Care/Home Mgmt/ADL, Compensatory, Meal Prep Minutes (37949) 9 Minutes   Hand Goals   Hand Goals Bathing;Dressing;Household Chores;Work;Sports/Recreation   Bathing   Current Functional Task Squeezing;Holding   Previous Performance Level Independent   Current Performance Level Mild difficulty   Goal Target Task Squeeze shampoo bottle;Hold shampoo/conditioner in palm   Goal Target Performance Level No difficulty   Due date 05/10/19   Date Goal Met 04/23/19   Dressing   Current Functional Task Dressing LB;Buttoning;Snapping   Previous Performance Level Independent   Current Performance Level Mild difficulty   Goal Target Task Don/Doff pants;Botton pants   Goal Target Performance Level Mild difficulty   Due Date 05/10/19   Date Goal Met 04/23/19   Household Chores   Current Functional Task Vacuuming;Sweeping;Gripping   Previous Performance Level Independent   Current Performance Level Moderate difficulty   Goal Target Task Sweep floors;Hold and use vacuum ;Carry a shopping bag   Goal Target Performance Level No difficulty   Due Date 05/10/19   Work   Current Functional Task Computer use;Keyboarding;Mousing;Repetitive tasks   Previous Performance Level Independent   Current Performance Level Moderate difficulty   Goal Target Task  Type;Mouse;Complete repetitive tasks   Goal Target Performance Level No difficulty   Due Date 05/10/19   Sports/Recreation   Current Functional Task Weight bearing;Holding;Bicycling;Gripping   Previous Performance Level Independent   Curent Performance Level Moderate difficulty   Goal Target Task  bar;Weight bear through hand;Hold &  handlebars   Goal Target Performance Level No difficulty   Due Date 05/10/19   Assessment   Clinical Impression(s) Comments Patient continues to have shooting pain in wrist to the hand.  Her strengthen has decreased with the shooting pain response with she makes fist flexing her fingers.     Response to Therapy: Lack of Improvement Strength   Response to Therapy: Improvements ROM;Flexibility;Pain;Self Care Skills   Education   Learner Patient   Readiness Acceptance   Method Explanation;Demonstration;Booklet/handout   Response Demonstrates understanding;Verbalizes understanding   Education Notes weighted exercises   Plan   Home program Add weight to ROM and self massage   Plan US, parafin, hot pack, ROM, joint protection   Total Session Time   Timed Code Treatment Minutes 49     SUDARSHAN Owen/L  [facility]  [phone]

## 2019-04-24 NOTE — NURSING NOTE
Referral placed and faxed to [facility] for scheduling of an EMG for ongoing left wrist pain. Amairani Josue LPN

## 2019-04-24 NOTE — PROGRESS NOTES
"Here is update from OT:  \"We did an updated progress note today.  She has made good progress with ROM and some functional gains.  She has however decreased her strength in that hand by about 10# to an average of 34# (average for her age is around 60# with left hand).  She does report relief of symptoms after visits although maybe getting frustrated with slowed progress.  She does seem to have irritation or involvement with the radial nerve.  Any other thoughts for me?     I can call to have her schedule further visit to continue with OT treatments for further healing of that area.\"    I spoke to Jennifer and gave her the option of referral to orthopedics, referral for EMG, or continuing with occupational therapy.  We are going to set up the EMG as well as continue OT and then will connect with her after the EMG results to decide if she wants to go on to see Ortho.  Marni Montemayor MD   "

## 2019-05-09 ENCOUNTER — HOSPITAL ENCOUNTER (OUTPATIENT)
Dept: OCCUPATIONAL THERAPY | Facility: CLINIC | Age: 49
Setting detail: THERAPIES SERIES
End: 2019-05-09
Attending: FAMILY MEDICINE
Payer: COMMERCIAL

## 2019-05-09 PROCEDURE — 97535 SELF CARE MNGMENT TRAINING: CPT | Mod: GO

## 2019-05-09 PROCEDURE — 97140 MANUAL THERAPY 1/> REGIONS: CPT | Mod: GO

## 2019-05-09 PROCEDURE — 97035 APP MDLTY 1+ULTRASOUND EA 15: CPT | Mod: GO

## 2019-05-09 PROCEDURE — 97110 THERAPEUTIC EXERCISES: CPT | Mod: GO

## 2019-05-21 ENCOUNTER — TRANSFERRED RECORDS (OUTPATIENT)
Dept: HEALTH INFORMATION MANAGEMENT | Facility: CLINIC | Age: 49
End: 2019-05-21

## 2019-06-02 ENCOUNTER — HOSPITAL ENCOUNTER (EMERGENCY)
Facility: CLINIC | Age: 49
Discharge: HOME | End: 2019-06-02
Attending: EMERGENCY MEDICINE | Admitting: EMERGENCY MEDICINE
Payer: COMMERCIAL

## 2019-06-02 VITALS
RESPIRATION RATE: 17 BRPM | OXYGEN SATURATION: 97 % | BODY MASS INDEX: 20.99 KG/M2 | DIASTOLIC BLOOD PRESSURE: 62 MMHG | TEMPERATURE: 98.2 F | WEIGHT: 155 LBS | SYSTOLIC BLOOD PRESSURE: 128 MMHG | HEART RATE: 76 BPM | HEIGHT: 72 IN

## 2019-06-02 DIAGNOSIS — T65.91XA ACCIDENTAL INGESTION OF SUBSTANCE, INITIAL ENCOUNTER: ICD-10-CM

## 2019-06-02 PROCEDURE — 25000132 ZZH RX MED GY IP 250 OP 250 PS 637: Performed by: EMERGENCY MEDICINE

## 2019-06-02 PROCEDURE — 99283 EMERGENCY DEPT VISIT LOW MDM: CPT | Performed by: EMERGENCY MEDICINE

## 2019-06-02 PROCEDURE — 25000125 ZZHC RX 250: Performed by: EMERGENCY MEDICINE

## 2019-06-02 PROCEDURE — 99284 EMERGENCY DEPT VISIT MOD MDM: CPT | Mod: Z6 | Performed by: EMERGENCY MEDICINE

## 2019-06-02 RX ADMIN — LIDOCAINE HYDROCHLORIDE 30 ML: 20 SOLUTION ORAL; TOPICAL at 10:51

## 2019-06-02 ASSESSMENT — MIFFLIN-ST. JEOR: SCORE: 1445.08

## 2019-06-02 NOTE — ED PROVIDER NOTES
History     Chief Complaint   Patient presents with     Ingestion     The history is provided by the patient and the spouse.     Jennifer Reyes is a 48 year old female w/hx of peanut allergies presents to the ED complaining of accidental chemical ingestion around 9:45 (1 hour ago). Patient's  states that deer eat their garden plants so he mixed some ammonia and water to spray on the rocks to keep them away.  He use a spray bottle that had ZEP shower, tub and tile  and it initially.  He reported that zap into a glass while he was using the spray to save for later, but left that glass on the counter. The patient states she was really thirsty and thought that glass looked like water so she took a big gulp.  Immediately after she took a drink she could taste the chemical taste and shortly after her esophagus started to burn. She drank some milk immediately after and then about 10 minutes later she tried to make herself vomit. The patient had an episode of emesis and it slightly made the burning sensation worse. Currently she states she also has a headache and feels her voice is slightly raspy. She denies SOB, inhaler use, difficulty swallowing, or throat swelling.      Allergies:  Allergies   Allergen Reactions     Banana Hives     Same with kiwi     Cats      Morphine Hives     Peanuts [Nuts]      Patient states she is allergic to all tree nuts.     Rocephin [Ceftriaxone]        Problem List:    Patient Active Problem List    Diagnosis Date Noted     Chemical dependency (H) 04/06/2015     Priority: Medium     Chronic low back pain 10/24/2014     Priority: Medium     [facility] U[facility]A at [facility].        CARDIOVASCULAR SCREENING; LDL GOAL LESS THAN 100 10/31/2010     Priority: Medium     Idiopathic cardiomyopathy (H) 07/20/2010     Priority: Medium     Major depressive disorder, single episode, severe (H) 11/20/2006     Priority: Medium     Stable on Effexor and Trazodone  Problem list name  updated by automated process. 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     Lumbago 02/20/10     Myocardial infarction (H) 2010     Other pulmonary insufficiency, not elsewhere classified      Poisoning and toxic reactions caused by other plants      Pulmonary insufficiency following trauma and surgery 05/28/06     Syncope 7/7/10     Syncope and collapse        Past Surgical History:    Past Surgical History:   Procedure Laterality Date     C LUMBAR SPINE FUSION,ANTER APPRCH  2012    L4 and L5 to S1     HC DEBRIDE SKIN/SUBQ TISSUE  11/16/09    Post-op wound     HC INJ EPIDURAL LUMBAR/SACRAL W/WO CONTRAST  2009    Left L4-5     HC LAP,FULGURATE/EXCISE LESIONS  02/27/2007    Dx lap, fulguration of endometriosis.  [facility]     HC REMOVAL OF TONSILS,<11 Y/O  Age 7 or 8     HC TOOTH EXTRACTION W/FORCEP  Age 17    [place] teeth extraction x4.     HC UGI ENDOSCOPY, SIMPLE EXAM  11/08/08     HYSTERECTOMY      approx 2008 - ovaries still in, unsure about cervix. no cancer. had fibroids.     HYSTERECTOMY, PAP NO LONGER INDICATED       LAMINECT/DISCECTOMY, LUMBAR  10/24/09     TUBAL LIGATION  02/27/2007    [facility]       Family History:    Family History   Problem Relation Age of Onset     Anxiety Disorder Father      Heart Failure Father      Depression Father      Alcohol/Drug Father      Anxiety Disorder Brother      Bipolar Disorder Sister      Suicide Paternal Uncle      Heart Failure  Mother      Breast Cancer Maternal Aunt      Cancer Maternal Grandfather         stomach     Cancer Paternal Grandmother         stomach     Depression Brother        Social History:  Marital Status:   [4]  Social History     Tobacco Use     Smoking status: Never Smoker     Smokeless tobacco: Never Used   Substance Use Topics     Alcohol use: Yes     Comment: socially     Drug use: No        Medications:      EPINEPHrine (EPIPEN/ADRENACLICK/OR ANY BX GENERIC EQUIV) 0.3 MG/0.3ML injection 2-pack   Fexofenadine HCl (ALLEGRA PO)   traZODone (DESYREL) 100 MG tablet   venlafaxine (EFFEXOR-XR) 150 MG 24 hr capsule         Review of Systems  All other ROS reviewed and are negative or non-contributory except as stated in HPI.    Physical Exam   BP: 126/71  Pulse: 77  Temp: 98.2  F (36.8  C)  Resp: 17  Height: 182.9 cm (6')  Weight: 70.3 kg (155 lb)  SpO2: 98 %      Physical Exam   Constitutional: She appears well-developed and well-nourished.   Healthy-appearing female sitting in the bed   HENT:   Head: Normocephalic.   Nose: Nose normal.   Mouth/Throat: Oropharynx is clear and moist.   Very slightly hoarse voice.  No intraoral lesions.  Oropharynx clear   Eyes: Conjunctivae and EOM are normal.   Neck: Normal range of motion. Neck supple.   Cardiovascular: Normal rate, regular rhythm, normal heart sounds and intact distal pulses.   Pulmonary/Chest: Effort normal and breath sounds normal.   Musculoskeletal: Normal range of motion. She exhibits no tenderness.   Neurological: She is alert.   Skin: Skin is warm and dry.   Psychiatric: She has a normal mood and affect. Her behavior is normal.   Vitals reviewed.         ED Course (with Medical Decision Making)    Pt seen and examined by me.  RN and EPIC notes reviewed.      Patient presenting after accidental ingestion of a shower/tub/tile .  She did drink milk and made herself vomit but she notes burning pain in her esophagus.  There are no obvious intraoral  lesions/burns.  Breathing is normal.  Vital signs within normal limits.  Poison center was contacted.  Apparently this has a very low pH.  They recommend that it be diluted out with lots of fluids.  Evaluate for any intraoral burns or concerning esophageal injury.    There are no burns and patient is able to swallow so I think that this will heal.  She was given a GI cocktail with complete resolution of her pain.  She is drinking some diluted apple juice without difficulty.  Recommend continuing lots of fluids.  Return for any worsening.        Procedures      Medications   lidocaine HCl (XYLOCAINE) 2 % 15 mL, alum & mag hydroxide-simethicone (MYLANTA ES/MAALOX  ES) 15 mL GI Cocktail (30 mLs Oral Given 6/2/19 1051)       Assessments & Plan      I have reviewed the findings, diagnosis, plan and need for follow up with the patient.       Medication List      There are no discharge medications for this visit.         Final diagnoses:   Accidental ingestion of substance, initial encounter - ZEP shower, tub and tile    Disposition: Patient discharged home in stable condition.  Plan as above.  Return for concerns.      This document serves as a record of services personally performed by Linda Cummings MD. It was created on their behalf by Cherelle Kirby, a trained medical scribe. The creation of this record is based on the provider's personal observations and the statements of the patient. This document has been checked and approved by the attending provider.    Disclaimer: This note consists of symbols derived from keyboarding, dictation and/or voice recognition software. As a result, there may be errors in the script that have gone undetected. Please consider this when interpreting information found in this chart.    6/2/2019   [facility] EMERGENCY DEPARTMENT     Linda Cummings MD  06/02/19 2355

## 2019-06-02 NOTE — ED NOTES
Ingestion of zep, shower, tub and tile , describes it being 1 big gulp. Pt does report drinking milk after ingestion and then making herself throw up.  Poison control called.  Risk of burns due to Ph of product around 2.  Thorough exam of mouth, PO challenge, if cannot tolerate will need to consult GI for scope.  Supportive care if non-symptomatic.  Watch for burns or ulcers.  Dilution.  Spoke with Diamond

## 2019-06-02 NOTE — ED TRIAGE NOTES
Accidentally ingested tub and tile  that her  had poured in a drinking glass to while exchanging containers.  Thinks she probably swallowed 2-3 ounces.  Did have emesis 10 minutes after ingestion.  Describes her throat as burning right now and has a headache.

## 2019-06-02 NOTE — ED AVS SNAPSHOT
[facility] Emergency Department  [address] [place] [address]  Phone:  [phone]  Fax:  [phone]                                    Jennifer Reyes   MRN: [id number]    Department:  [facility] Emergency Department   Date of Visit:  6/2/2019           After Visit Summary Signature Page    I have received my discharge instructions, and my questions have been answered. I have discussed any challenges I see with this plan with the nurse or doctor.    ..........................................................................................................................................  Patient/Patient Representative Signature      ..........................................................................................................................................  Patient Representative Print Name and Relationship to Patient    ..................................................               ................................................  Date                                   Time    ..........................................................................................................................................  Reviewed by Signature/Title    ...................................................              ..............................................  Date                                               Time          22EPIC Rev 08/18

## 2019-06-02 NOTE — DISCHARGE INSTRUCTIONS
Continue to drink plenty of fluids today.    If you have any worsening, changes or concerns, return at any time.    I hope you have a fun summer!!

## 2019-06-10 ENCOUNTER — HOSPITAL ENCOUNTER (OUTPATIENT)
Dept: OCCUPATIONAL THERAPY | Facility: CLINIC | Age: 49
Setting detail: THERAPIES SERIES
End: 2019-06-10
Attending: FAMILY MEDICINE
Payer: COMMERCIAL

## 2019-06-10 PROCEDURE — 97140 MANUAL THERAPY 1/> REGIONS: CPT | Mod: GO

## 2019-06-10 PROCEDURE — 97110 THERAPEUTIC EXERCISES: CPT | Mod: GO

## 2019-06-10 PROCEDURE — 97035 APP MDLTY 1+ULTRASOUND EA 15: CPT | Mod: GO

## 2019-06-11 NOTE — PROGRESS NOTES
06/10/19 0952   Notes   Note Type Discharge Summary   Signing Clinician's Name / Credentials   Signing clinician's name / credentials SUDARSHAN Owen/L   Session Number   Session Number 8   Authorization status Limited to 90 COMBINED (PT/OT/SLP) visits per CALENDAR YEAR, HARD MAX; 0 USED   Providers   Referring Physician Dr. Marni Montemayor   Progress Note/Recertification   Progress Note Due Date 07/19/19   General Information   Medical Diagnosis Left wrist pain (M25.532)   Orders Evaluate And Treat As Indicated   Start Of Care Date 03/13/19   Onset date of current episode/exacerbation 11/30/18   Subjective Measures   Subjective Reports life stressors at home that has limited therapy attendance.  She does report increased activity level with use of Left arm and decreased pain.    Initial Pain level 3/10   Current Pain level 1/10   Modalities   Modalities Ultrasound   Ultrasound -Type 2 cm sound head;Continuous   Ultrasound, Minutes (61625) 15 Minutes   Skilled Interventions To Increase Blood Flow For Improved Healing;To Increase Tissue Extensibility;To Enhance Tissue Repair;To Enhance Joint Rom;To Decrease Pain;To Decrease Inflammation   Intensity 0.8 w/cm2   Frequency 3 MHz   Location radial aspect of forearm, thumb, thenar eminence   Positioning sitting   Therapeutic Exercise   Therapeutic Exercise ROM/AROM;Other Exercises/Activities   Skilled Interventions To Increase Blood Flow For Improved Healing;To Increase Strength;To Enhance Joint Rom;To Decrease Pain;To Decrease Inflammation   Therapeutic Procedure: strength, endurance, ROM, flexibillity minutes (75988) 15   Other Exer/Activities/Educ   Exercise 1 Strengthening   Description 1 1# weight with ROM exercises   Manual   Manual STM   Manual Therapy Minutes (20704) 10   Skilled Interventions To Increase Blood Flow For Improved Healing;To Increase Tissue Extensibility;To Enhance Tendon Gliding;To Enhance Tissue Repair;To Enhance Joint Rom;To Decrease  Pain;To Decrease Hypersensitivity;To Decrease Inflammation   STM Thenars;Flexors;Tool Assisted   Position Sitting   Description/ Technique medium pressure with use of small gua sha tool assist longitudinal distal to proximal, CW, CCW   Hand Goals   Hand Goals Bathing;Dressing;Household Chores;Work;Sports/Recreation   Bathing   Current Functional Task Squeezing;Holding   Previous Performance Level Independent   Current Performance Level Mild difficulty   Goal Target Task Squeeze shampoo bottle;Hold shampoo/conditioner in palm   Goal Target Performance Level No difficulty   Due date 05/10/19   Date Goal Met 04/23/19   Dressing   Current Functional Task Dressing LB;Buttoning;Snapping   Previous Performance Level Independent   Current Performance Level Mild difficulty   Goal Target Task Don/Doff pants;Botton pants   Goal Target Performance Level Mild difficulty   Due Date 05/10/19   Date Goal Met 04/23/19   Household Chores   Current Functional Task Vacuuming;Sweeping;Gripping   Previous Performance Level Independent   Current Performance Level Moderate difficulty   Goal Target Task Sweep floors;Hold and use vacuum ;Carry a shopping bag   Goal Target Performance Level No difficulty   Due Date 05/10/19   If goal not met, Why? Patient reports mild difficulty.  Slow improvement   Work   Current Functional Task Computer use;Keyboarding;Mousing;Repetitive tasks   Previous Performance Level Independent   Current Performance Level Moderate difficulty   Goal Target Task Type;Mouse;Complete repetitive tasks   Goal Target Performance Level No difficulty   Due Date 05/10/19   Date Goal Met 06/10/19   Sports/Recreation   Current Functional Task Weight bearing;Holding;Bicycling;Gripping   Previous Performance Level Independent   Curent Performance Level Moderate difficulty   Goal Target Task  bar;Weight bear through hand;Hold &  handlebars   Goal Target Performance Level No difficulty   Due Date 05/10/19   If goal not  met, Why? Still mild difficulty although progressing.  Does do some gardenging without difficulty although not back to cycling yet due to increased irritation at wrist with weight bearing.    Assessment   Clinical Impression(s) Comments Patient is demonstrating increasing ROM although some pain.  She continues to make gain with education provided despite not being consistent with therapy appointments. 74/80 UEFI (+3 since eval)   Response to Therapy: Improvements ROM;Flexibility;Pain;Self Care Skills;Strength   Education   Learner Patient   Readiness Acceptance   Method Explanation;Demonstration;Booklet/handout   Response Demonstrates understanding;Verbalizes understanding   Education Notes ergonomincs with work/yardwork   Plan   Home program Add weight to ROM and self massage   Plan Discharge   Total Session Time   Timed Code Treatment Minutes 40   Total Treatment Time (sum of timed and untimed services) 40     Thank you for the referral to Occupational Therapy!    SUDARSHAN Owen/L  [facility]  [phone]

## 2019-06-17 DIAGNOSIS — G47.00 PERSISTENT INSOMNIA: ICD-10-CM

## 2019-06-17 DIAGNOSIS — F32.2 MAJOR DEPRESSIVE DISORDER, SINGLE EPISODE, SEVERE WITHOUT PSYCHOTIC FEATURES (H): ICD-10-CM

## 2019-06-19 RX ORDER — TRAZODONE HYDROCHLORIDE 100 MG/1
50-100 TABLET ORAL AT BEDTIME
Qty: 90 TABLET | Refills: 3 | Status: SHIPPED | OUTPATIENT
Start: 2019-06-19 | End: 2020-05-05

## 2019-06-19 NOTE — TELEPHONE ENCOUNTER
"Last Written Prescription Date:  2/26/19  Last Fill Quantity: 90,  # refills: 3   Last office visit: 4/23/2019 with prescribing provider:  Marni Montemayor   Future Office Visit:      Requested Prescriptions   Pending Prescriptions Disp Refills     traZODone (DESYREL) 100 MG tablet 90 tablet 3     Sig: Take 0.5-1 tablets ( mg) by mouth At Bedtime Appointment needed for additional refills.       Serotonin Modulators Passed - 6/17/2019 11:53 AM        Passed - Recent (12 mo) or future (30 days) visit within the authorizing provider's specialty     Patient had office visit in the last 12 months or has a visit in the next 30 days with authorizing provider or within the authorizing provider's specialty.  See \"Patient Info\" tab in inbasket, or \"Choose Columns\" in Meds & Orders section of the refill encounter.              Passed - Medication is active on med list        Passed - Patient is age 18 or older        Passed - No active pregnancy on record        Passed - No positive pregnancy test in past 12 months        Prescription approved per [facility] Refill Protocol.    Aure Banegas RN on 6/19/2019 at 3:04 PM    "

## 2019-11-04 ENCOUNTER — HEALTH MAINTENANCE LETTER (OUTPATIENT)
Age: 49
End: 2019-11-04

## 2020-02-09 DIAGNOSIS — F32.2 MAJOR DEPRESSIVE DISORDER, SINGLE EPISODE, SEVERE WITHOUT PSYCHOTIC FEATURES (H): ICD-10-CM

## 2020-02-10 RX ORDER — VENLAFAXINE HYDROCHLORIDE 150 MG/1
CAPSULE, EXTENDED RELEASE ORAL
Qty: 90 CAPSULE | Refills: 0 | Status: SHIPPED | OUTPATIENT
Start: 2020-02-10 | End: 2020-05-05

## 2020-02-10 NOTE — TELEPHONE ENCOUNTER
"Venlafaxine  Last Written Prescription Date:  02/26/2019  Last Fill Quantity: 90,  # refills: 3   Last office visit: 4/23/2019 with prescribing provider:  [place]  Future Office Visit:  None    Requested Prescriptions   Pending Prescriptions Disp Refills     venlafaxine (EFFEXOR-XR) 150 MG 24 hr capsule [Pharmacy Med Name: VENLAFAXINE HCL  MG CAP] 90 capsule 3     Sig: TAKE 1 CAPSULE (150 MG) BY MOUTH DAILY       Serotonin-Norepinephrine Reuptake Inhibitors  Failed - 2/9/2020  9:32 AM        Failed - PHQ-9 score of less than 5 in past 6 months     Please review last PHQ-9 score.           Failed - Normal serum creatinine on file in past 12 months     Recent Labs   Lab Test 06/24/18  2309   CR 0.74             Failed - Recent (6 mo) or future (30 days) visit within the authorizing provider's specialty     Patient had office visit in the last 6 months or has a visit in the next 30 days with authorizing provider or within the authorizing provider's specialty.  See \"Patient Info\" tab in inbasket, or \"Choose Columns\" in Meds & Orders section of the refill encounter.            Passed - Blood pressure under 140/90 in past 12 months     BP Readings from Last 3 Encounters:   06/02/19 128/62   04/23/19 118/82   02/26/19 126/76                 Passed - Medication is active on med list        Passed - Patient is age 18 or older        Passed - No active pregnancy on record        Passed - No positive pregnancy test in past 12 months        PHQ-9 score:    PHQ-9 SCORE 2/26/2019   PHQ-9 Total Score -   PHQ-9 Total Score 0     Medication is being filled for 1 time refill only due to:  Patient needs to be seen because PHQ 9 and 6 month follow up due.  Zora Juarez RN BSN    "

## 2020-03-05 NOTE — TELEPHONE ENCOUNTER
venlafaxine (EFFEXOR-XR) 150 MG 24 hr capsule  Last Written Prescription Date: 2/15/2017  Last Fill Quantity: 90, # refills: 0  Last Office Visit with [facility], [facility] or [facility] prescribing provider: 1/22/2016        BP Readings from Last 3 Encounters:   01/22/16 108/72   05/04/15 124/56   10/24/14 102/64     Pulse: (for Fetzima)  Creatinine   Date Value Ref Range Status   04/07/2015 0.88 0.52 - 1.04 mg/dL Final   ]    Last PHQ-9 score on record=   PHQ-9 SCORE 1/22/2016   Total Score -   Total Score 0     Liz Leija [facility]       caffeine

## 2020-05-05 ENCOUNTER — VIRTUAL VISIT (OUTPATIENT)
Dept: FAMILY MEDICINE | Facility: CLINIC | Age: 50
End: 2020-05-05
Payer: COMMERCIAL

## 2020-05-05 DIAGNOSIS — F32.2 MAJOR DEPRESSIVE DISORDER, SINGLE EPISODE, SEVERE WITHOUT PSYCHOTIC FEATURES (H): Primary | ICD-10-CM

## 2020-05-05 DIAGNOSIS — G47.00 PERSISTENT INSOMNIA: ICD-10-CM

## 2020-05-05 DIAGNOSIS — J30.2 SEASONAL ALLERGIC RHINITIS, UNSPECIFIED TRIGGER: ICD-10-CM

## 2020-05-05 DIAGNOSIS — T78.05XD ANAPHYLACTIC REACTION DUE TO TREE NUTS AND SEEDS, SUBSEQUENT ENCOUNTER: ICD-10-CM

## 2020-05-05 PROCEDURE — 99214 OFFICE O/P EST MOD 30 MIN: CPT | Mod: 95 | Performed by: FAMILY MEDICINE

## 2020-05-05 RX ORDER — VENLAFAXINE HYDROCHLORIDE 150 MG/1
CAPSULE, EXTENDED RELEASE ORAL
Qty: 90 CAPSULE | Refills: 3 | Status: SHIPPED | OUTPATIENT
Start: 2020-05-05 | End: 2021-04-12

## 2020-05-05 RX ORDER — TRAZODONE HYDROCHLORIDE 100 MG/1
50-100 TABLET ORAL AT BEDTIME
Qty: 90 TABLET | Refills: 3 | Status: SHIPPED | OUTPATIENT
Start: 2020-05-05 | End: 2021-04-12

## 2020-05-05 RX ORDER — FLUTICASONE PROPIONATE 50 MCG
2 SPRAY, SUSPENSION (ML) NASAL AT BEDTIME
Qty: 16 G | Refills: 5 | Status: SHIPPED | OUTPATIENT
Start: 2020-05-05 | End: 2021-05-10

## 2020-05-05 RX ORDER — EPINEPHRINE 0.3 MG/.3ML
0.3 INJECTION SUBCUTANEOUS PRN
Qty: 0.6 ML | Refills: 1 | Status: SHIPPED | OUTPATIENT
Start: 2020-05-05 | End: 2024-05-06

## 2020-05-05 ASSESSMENT — ANXIETY QUESTIONNAIRES
5. BEING SO RESTLESS THAT IT IS HARD TO SIT STILL: NOT AT ALL
2. NOT BEING ABLE TO STOP OR CONTROL WORRYING: SEVERAL DAYS
1. FEELING NERVOUS, ANXIOUS, OR ON EDGE: SEVERAL DAYS
3. WORRYING TOO MUCH ABOUT DIFFERENT THINGS: SEVERAL DAYS
7. FEELING AFRAID AS IF SOMETHING AWFUL MIGHT HAPPEN: NOT AT ALL
GAD7 TOTAL SCORE: 3
IF YOU CHECKED OFF ANY PROBLEMS ON THIS QUESTIONNAIRE, HOW DIFFICULT HAVE THESE PROBLEMS MADE IT FOR YOU TO DO YOUR WORK, TAKE CARE OF THINGS AT HOME, OR GET ALONG WITH OTHER PEOPLE: SOMEWHAT DIFFICULT
6. BECOMING EASILY ANNOYED OR IRRITABLE: NOT AT ALL

## 2020-05-05 ASSESSMENT — PATIENT HEALTH QUESTIONNAIRE - PHQ9
5. POOR APPETITE OR OVEREATING: NOT AT ALL
SUM OF ALL RESPONSES TO PHQ QUESTIONS 1-9: 1

## 2020-05-05 NOTE — PROGRESS NOTES
"Jennifer Reyes is a 49 year old female who is being evaluated via a billable telephone visit.      Telephone visit performed in lieu of an in-person visit due to current concerns regarding the current COVID-19 situation including social distancing and keeping at risk people safe.  Patient agreed to proceed with this visit due to these circumstances.     The patient has been notified of following:     \"This telephone visit will be conducted via a call between you and your physician/provider. We have found that certain health care needs can be provided without the need for a physical exam.  This service lets us provide the care you need with a short phone conversation.  If a prescription is necessary we can send it directly to your pharmacy.  If lab work is needed we can place an order for that and you can then stop by our lab to have the test done at a later time.    Telephone visits are billed at different rates depending on your insurance coverage. During this emergency period, for some insurers they may be billed the same as an in-person visit.  Please reach out to your insurance provider with any questions.    If during the course of the call the physician/provider feels a telephone visit is not appropriate, you will not be charged for this service.\"    Patient has given verbal consent for Telephone visit?  Yes    What phone number would you like to be contacted at? [phone]    How would you like to obtain your AVS? Suman Cameron     Jennifer Reyes is a 49 year old female who presents via telephone today for the following health issues:    HPI  Anxiety Follow-Up    How are you doing with your anxiety since your last visit? Improved butter during spring and summer months     Are you having other symptoms that might be associated with anxiety? No    Have you had a significant life event? No     Are you feeling depressed? No    Do you have any concerns with your use of alcohol or other drugs? " No    Social History     Tobacco Use     Smoking status: Never Smoker     Smokeless tobacco: Never Used   Substance Use Topics     Alcohol use: Yes     Comment: socially     Drug use: No     MIKE-7 SCORE 2/26/2019 5/5/2020   Total Score 5 3     PHQ 10/4/2017 2/26/2019 5/5/2020   PHQ-9 Total Score 1 0 1   Q9: Thoughts of better off dead/self-harm past 2 weeks Not at all Not at all Not at all     Last PHQ-9 5/5/2020   1.  Little interest or pleasure in doing things 0   2.  Feeling down, depressed, or hopeless 0   3.  Trouble falling or staying asleep, or sleeping too much 0   4.  Feeling tired or having little energy 1   5.  Poor appetite or overeating 0   6.  Feeling bad about yourself 0   7.  Trouble concentrating 0   8.  Moving slowly or restless 0   Q9: Thoughts of better off dead/self-harm past 2 weeks 0   PHQ-9 Total Score 1   Difficulty at work, home, or with people Somewhat difficult         How many servings of fruits and vegetables do you eat daily?  2-3    On average, how many sweetened beverages do you drink each day (Examples: soda, juice, sweet tea, etc.  Do NOT count diet or artificially sweetened beverages)?   1    How many days per week do you exercise enough to make your heart beat faster? 3 or less    How many minutes a day do you exercise enough to make your heart beat faster? 30 - 60    How many days per week do you miss taking your medication? 0        Medication Followup of Trazodone and Effexor     Taking Medication as prescribed: yes    Side Effects:  None    Medication Helping Symptoms:  yes       Patient Active Problem List   Diagnosis     Major depressive disorder, single episode, severe (H)     Idiopathic cardiomyopathy (H)     CARDIOVASCULAR SCREENING; LDL GOAL LESS THAN 100     Chronic low back pain     Chemical dependency (H)     Past Surgical History:   Procedure Laterality Date     C LUMBAR SPINE FUSION,ANTER APPRCH  2012    L4 and L5 to S1     HC DEBRIDE SKIN/SUBQ TISSUE  11/16/09     Post-op wound     HC INJ EPIDURAL LUMBAR/SACRAL W/WO CONTRAST  2009    Left L4-5     HC LAP,FULGURATE/EXCISE LESIONS  02/27/2007    Dx lap, fulguration of endometriosis.  [facility]     HC REMOVAL OF TONSILS,<13 Y/O  Age 7 or 8     HC TOOTH EXTRACTION W/FORCEP  Age 17    Dora teeth extraction x4.     HC UGI ENDOSCOPY, SIMPLE EXAM  11/08/08     HYSTERECTOMY      approx 2008 - ovaries still in, unsure about cervix. no cancer. had fibroids.     HYSTERECTOMY, PAP NO LONGER INDICATED       LAMINECT/DISCECTOMY, LUMBAR  10/24/09     TUBAL LIGATION  02/27/2007    [facility]       Social History     Tobacco Use     Smoking status: Never Smoker     Smokeless tobacco: Never Used   Substance Use Topics     Alcohol use: Yes     Comment: socially     Family History   Problem Relation Age of Onset     Anxiety Disorder Father      Heart Failure Father      Depression Father      Alcohol/Drug Father      Anxiety Disorder Brother      Bipolar Disorder Sister      Suicide Paternal Uncle      Heart Failure Mother      Breast Cancer Maternal Aunt      Cancer Maternal Grandfather         stomach     Cancer Paternal Grandmother         stomach     Depression Brother            Reviewed and updated as needed this visit by Provider  Tobacco  Allergies  Meds  Problems  Med Hx  Surg Hx  Fam Hx         Review of Systems   She is doing well overall.  She has always worked from home, so the COVID-19 shutdown has not affected her too much.  She is taking her Effexor daily and it is working well.  She does not feel she needs a change.  She takes her trazodone either a half or full pill every night.  If she goes to bed early enough she takes a full 1 and if it is too late when she goes to bed she takes 1/2 pill.  It works well.  She also has seasonal allergies and that is been a bother for her lately.  She has a few bad weeks in the spring and she is right in the midst of it now.  She would like a prescription for Flonase.  She is taking  a daily antihistamine, usually over-the-counter Allegra.  Also she keeps EpiPen at home for her history of anaphylaxis and would like a refill on that.  Her current supply is .  She has not needed to use it in quite a while.  7 pt ROS is otherwise negative except as noted in HPI.      Objective   Reported vitals:  LMP 2007 (Exact Date)    healthy, alert and no distress  PSYCH: Alert and oriented times 3; coherent speech, normal   rate and volume, able to articulate logical thoughts, able   to abstract reason, no tangential thoughts, no hallucinations   or delusions  Her affect is pleasant  RESP: No cough, no audible wheezing, able to talk in full sentences  Remainder of exam unable to be completed due to telephone visits            Assessment/Plan:    ICD-10-CM    1. Major depressive disorder, single episode, severe without psychotic features (H)  F32.2 traZODone (DESYREL) 100 MG tablet     venlafaxine (EFFEXOR-XR) 150 MG 24 hr capsule   2. Persistent insomnia  G47.00 traZODone (DESYREL) 100 MG tablet   3. Anaphylactic reaction due to tree nuts and seeds, subsequent encounter  T78.05XD EPINEPHrine (ANY BX GENERIC EQUIV) 0.3 MG/0.3ML injection 2-pack   4. Seasonal allergic rhinitis, unspecified trigger  J30.2 fluticasone (FLONASE) 50 MCG/ACT nasal spray      I agreed to refill her medications for the year.  She is stable and they are working well.  She does have another family physician that she sees yearly for a physical exam, and I recommend she schedule that later this summer or fall when the COVID shutdown has lessened.  From my perspective I need to see her once yearly to refill her medications, or sooner if changes occur.    Return in about 6 months (around 2020) for Physical Exam.      Phone call duration:  7 minutes    Marni Montemayor MD

## 2020-05-05 NOTE — PATIENT INSTRUCTIONS
I have sent in 1 year's worth of refills for the trazodone and the Effexor.  I also sent a new prescription for Flonase and a refill for your EpiPen.  These should last you the year.  I do encourage a yearly physical and otherwise follow-up if you have concerns about your medications or needing a change or any other health concerns.    It was a pleasure speaking with you today.   Stay safe.

## 2020-05-06 ASSESSMENT — ANXIETY QUESTIONNAIRES: GAD7 TOTAL SCORE: 3

## 2020-10-23 ENCOUNTER — TELEPHONE (OUTPATIENT)
Dept: FAMILY MEDICINE | Facility: CLINIC | Age: 50
End: 2020-10-23

## 2020-11-22 ENCOUNTER — HEALTH MAINTENANCE LETTER (OUTPATIENT)
Age: 50
End: 2020-11-22

## 2021-04-09 ENCOUNTER — MYC MEDICAL ADVICE (OUTPATIENT)
Dept: FAMILY MEDICINE | Facility: CLINIC | Age: 51
End: 2021-04-09

## 2021-04-09 DIAGNOSIS — F32.2 MAJOR DEPRESSIVE DISORDER, SINGLE EPISODE, SEVERE WITHOUT PSYCHOTIC FEATURES (H): ICD-10-CM

## 2021-04-09 DIAGNOSIS — G47.00 PERSISTENT INSOMNIA: ICD-10-CM

## 2021-04-09 NOTE — TELEPHONE ENCOUNTER
There is already a refill encounter for these medications.  Patient is informed via MyChart that she will need an appointment for continued refills.  Closing this encounter.  VAN JuniorN, RN

## 2021-04-12 RX ORDER — TRAZODONE HYDROCHLORIDE 100 MG/1
50-100 TABLET ORAL AT BEDTIME
Qty: 30 TABLET | Refills: 0 | Status: SHIPPED | OUTPATIENT
Start: 2021-04-12 | End: 2021-05-10

## 2021-04-12 NOTE — TELEPHONE ENCOUNTER
"Requested Prescriptions   Pending Prescriptions Disp Refills     traZODone (DESYREL) 100 MG tablet [Pharmacy Med Name: TRAZODONE 100 MG TABLET] 90 tablet 3     Sig: TAKE 0.5-1 TABLETS ( MG) BY MOUTH AT BEDTIME. APPOINTMENT NEEDED FOR ADDITIONAL REFILLS.   5/5/2020    Serotonin Modulators Passed - 4/9/2021  9:55 AM        Passed - Recent (12 mo) or future (30 days) visit within the authorizing provider's specialty     Patient has had an office visit with the authorizing provider or a provider within the authorizing providers department within the previous 12 mos or has a future within next 30 days. See \"Patient Info\" tab in inbasket, or \"Choose Columns\" in Meds & Orders section of the refill encounter.            Passed - Medication is active on med list        Passed - Patient is age 18 or older        Passed - No active pregnancy on record        Passed - No positive pregnancy test in past 12 months      Prescription approved per [facility] Refill Protocol.       venlafaxine (EFFEXOR-XR) 150 MG 24 hr capsule [Pharmacy Med Name: VENLAFAXINE HCL  MG CAP] 90 capsule 3     Sig: TAKE 1 CAPSULE BY MOUTH EVERY DAY   Last Written Prescription Date:  5/5/2020  Last Fill Quantity: 90,  # refills: 3   Last office visit: 5/5/2020 with prescribing provider:     Future Office Visit:      Serotonin-Norepinephrine Reuptake Inhibitors  Failed - 4/9/2021  9:55 AM        Failed - Blood pressure under 140/90 in past 12 months     BP Readings from Last 3 Encounters:   06/02/19 128/62   04/23/19 118/82   02/26/19 126/76           Failed - PHQ-9 score of less than 5 in past 6 months     Please review last PHQ-9 score.           Failed - Normal serum creatinine on file in past 12 months     Recent Labs   Lab Test 06/24/18  2309   CR 0.74     Ok to refill medication if creatinine is low          Failed - Recent (6 mo) or future (30 days) visit within the authorizing provider's specialty     Patient had office visit in the last 6 " "months or has a visit in the next 30 days with authorizing provider or within the authorizing provider's specialty.  See \"Patient Info\" tab in inbasket, or \"Choose Columns\" in Meds & Orders section of the refill encounter.            Passed - Medication is active on med list        Passed - Patient is age 18 or older        Passed - No active pregnancy on record        Passed - No positive pregnancy test in past 12 months         Routing refill request to provider for review/approval  Cydney Cross RN          "

## 2021-04-12 NOTE — TELEPHONE ENCOUNTER
Routing refill request to provider for review/approval because:  Labs not current:  CR  BP not current, last taken 6/2/2019  PHQ9 score not current  PHQ-9 score:    PHQ 5/5/2020   PHQ-9 Total Score 1   Q9: Thoughts of better off dead/self-harm past 2 weeks Not at all   T'd up 1 month for provider review.          venlafaxine (EFFEXOR-XR) 150 MG 24 hr capsule [Pharmacy Med Name: VENLAFAXINE HCL  MG CAP] 90 capsule 3     Sig: TAKE 1 CAPSULE BY MOUTH EVERY DAY   Last Written Prescription Date:  5/5/2020  Last Fill Quantity: 90,  # refills: 3   Last office visit: 5/5/2020 with prescribing provider:     Future Office Visit:       Cydney Cross RN

## 2021-04-13 ENCOUNTER — MYC MEDICAL ADVICE (OUTPATIENT)
Dept: FAMILY MEDICINE | Facility: CLINIC | Age: 51
End: 2021-04-13

## 2021-04-13 NOTE — TELEPHONE ENCOUNTER
This refill has already been sent.  It is now sent high priority to PCP.  This is a duplicate encounter.  Closing this encounter.  VAN JuniorN, RN

## 2021-04-14 RX ORDER — VENLAFAXINE HYDROCHLORIDE 150 MG/1
CAPSULE, EXTENDED RELEASE ORAL
Qty: 90 CAPSULE | Refills: 0 | Status: SHIPPED | OUTPATIENT
Start: 2021-04-14 | End: 2021-05-10

## 2021-05-07 DIAGNOSIS — G47.00 PERSISTENT INSOMNIA: ICD-10-CM

## 2021-05-07 DIAGNOSIS — F32.2 MAJOR DEPRESSIVE DISORDER, SINGLE EPISODE, SEVERE WITHOUT PSYCHOTIC FEATURES (H): ICD-10-CM

## 2021-05-07 NOTE — TELEPHONE ENCOUNTER
Routing refill request to provider for review/approval because:  Drug interaction warning        Teresa Cm RN

## 2021-05-10 ENCOUNTER — VIRTUAL VISIT (OUTPATIENT)
Dept: FAMILY MEDICINE | Facility: CLINIC | Age: 51
End: 2021-05-10
Payer: COMMERCIAL

## 2021-05-10 DIAGNOSIS — E55.9 VITAMIN D DEFICIENCY: ICD-10-CM

## 2021-05-10 DIAGNOSIS — Z12.31 ENCOUNTER FOR SCREENING MAMMOGRAM FOR MALIGNANT NEOPLASM OF BREAST: ICD-10-CM

## 2021-05-10 DIAGNOSIS — F32.2 MAJOR DEPRESSIVE DISORDER, SINGLE EPISODE, SEVERE WITHOUT PSYCHOTIC FEATURES (H): ICD-10-CM

## 2021-05-10 DIAGNOSIS — J30.1 SEASONAL ALLERGIC RHINITIS DUE TO POLLEN: Primary | ICD-10-CM

## 2021-05-10 DIAGNOSIS — G47.00 PERSISTENT INSOMNIA: ICD-10-CM

## 2021-05-10 PROCEDURE — 99214 OFFICE O/P EST MOD 30 MIN: CPT | Mod: GT | Performed by: FAMILY MEDICINE

## 2021-05-10 RX ORDER — TRAZODONE HYDROCHLORIDE 100 MG/1
50-100 TABLET ORAL
Qty: 90 TABLET | Refills: 3 | Status: SHIPPED | OUTPATIENT
Start: 2021-05-10 | End: 2022-04-26

## 2021-05-10 RX ORDER — VENLAFAXINE HYDROCHLORIDE 150 MG/1
CAPSULE, EXTENDED RELEASE ORAL
Qty: 90 CAPSULE | Refills: 3 | Status: SHIPPED | OUTPATIENT
Start: 2021-05-10 | End: 2022-06-14

## 2021-05-10 RX ORDER — FLUTICASONE PROPIONATE 50 MCG
2 SPRAY, SUSPENSION (ML) NASAL AT BEDTIME
Qty: 16 G | Refills: 5 | Status: SHIPPED | OUTPATIENT
Start: 2021-05-10 | End: 2021-10-20

## 2021-05-10 RX ORDER — CHOLECALCIFEROL (VITAMIN D3) 50 MCG
1 TABLET ORAL DAILY
COMMUNITY
Start: 2021-05-10 | End: 2022-10-23

## 2021-05-10 RX ORDER — METHYLPREDNISOLONE 4 MG/1
TABLET ORAL
Qty: 120 TABLET | Refills: 1 | Status: SHIPPED | OUTPATIENT
Start: 2021-05-10 | End: 2022-09-19

## 2021-05-10 ASSESSMENT — PATIENT HEALTH QUESTIONNAIRE - PHQ9: SUM OF ALL RESPONSES TO PHQ QUESTIONS 1-9: 1

## 2021-05-10 NOTE — PROGRESS NOTES
Jennifer is a 50 year old who is being evaluated via a billable video visit.      Video visit performed in lieu of an in-person visit due to current concerns regarding social distancing and keeping people safe.  Physician and patient agreed this was appropriate for concerns addressed.    How would you like to obtain your AVS? MyChart  If the video visit is dropped, the invitation should be resent by: Text to cell phone: [phone]  Will anyone else be joining your video visit? No    Video Start Time: 3:50 pm    Assessment & Plan       ICD-10-CM    1. Seasonal allergic rhinitis due to pollen  J30.1 methylPREDNISolone (MEDROL) 4 MG tablet     fluticasone (FLONASE) 50 MCG/ACT nasal spray   2. Major depressive disorder, single episode, severe without psychotic features (H)  F32.2 traZODone (DESYREL) 100 MG tablet     venlafaxine (EFFEXOR-XR) 150 MG 24 hr capsule   3. Persistent insomnia  G47.00 traZODone (DESYREL) 100 MG tablet   4. Encounter for screening mammogram for malignant neoplasm of breast  Z12.31 MA Screen Bilateral w/Denilson   5. Vitamin D deficiency  E55.9 vitamin D3 (CHOLECALCIFEROL) 50 mcg (2000 units) tablet      I recommend she stay on her flonase and increase her antihistamine to twice daily or double the OTC dose once daily for a trial. I also agreed to a short course of oral steroid, see orders for methylprednisolone taper. I advised her that I would reach out to allergist and discuss whether oral/sublingual/buccal desensitization is an option since she didn't tolerate shots. I sent a message to Dr. Lyn on this, will notify her with his response.   She does have refills of her Epi Pen.     I agreed to refill her effexor and her trazodone, they are working well. No change in treatment.     She gets her routine preventive care through her OB/GYN and is up to date except for her mammo, which I agreed to order. We discussed recommendations for mammo every 1-2 years. She does not enjoy getting mammo, but  agrees to schedule later this year. She is up to date on colonoscopy.     I reviewed her previous labs and her Vitamin D has been very low in the past. I recommend a vitamin D supplement of 2000 units daily. She plans to start this OTC. We discussed that evidence suggests that vitamin D may play a role in immune health.     I will plan to follow up with her yearly for medication refills, and am happy to follow with her sooner as needed.     30 minutes spent on the date of the encounter doing chart review, history and exam, documentation and further activities per the note      No follow-ups on file.    Marni Montemayor MD  [facility] STEPHON Rodriguez is a 50 year old who presents for the following health issues     HPI     Medication Followup of Effexor and trazodone    Taking Medication as prescribed: yes    Side Effects:  None    Medication Helping Symptoms:  yes     She is doing well on both of these (effexor/trazodone). She recently traveled to [place] to visit her family. Her mother is 88 and in failing health, which was a stress on her, but she was very happy to have the time to visit her and the family.     She has been struggling with seasonal allergies, lately have been terrible. She feels inflamed and swollen, inside and up in head, neck, sinuses, etc. She is using patanol for her eyes and flonase for the nose, oral antihistamine and still no benefit. She has done allergy shots in the past and they didn't work well. She is wondering about oral allergy desensitization drops. She is also wondering about using oral steroids. She did very well in [place] but they returned very severely on returning to [place].     Also she was hospitalized with a severe anaphylactic reaction in December, intubated in ICU. She has recovered well from that. She thinks she had an accidental ingestion of food allergen.     She follows with an OB/GYN for her annual preventive physical,  is up to date except mammogram, which she had in 2019.     Review of Systems   Constitutional, HEENT, cardiovascular, pulmonary, gi and gu systems are negative, except as otherwise noted.      Objective           Vitals:  No vitals were obtained today due to virtual visit.    Physical Exam   GENERAL: Healthy, alert and no distress  EYES: Eyes grossly normal to inspection.  No discharge or erythema, or obvious scleral/conjunctival abnormalities.  RESP: No audible wheeze, cough, or visible cyanosis.  No visible retractions or increased work of breathing.    SKIN: Visible skin clear. No significant rash, abnormal pigmentation or lesions.  NEURO: Cranial nerves grossly intact.  Mentation and speech appropriate for age.  PSYCH: Mentation appears normal, affect normal/bright, judgement and insight intact, normal speech and appearance well-groomed.            Video-Visit Details    Type of service:  Video Visit    Video End Time:4:15 pm    Originating Location (pt. Location): Home    Distant Location (provider location):  [facility]     Platform used for Video Visit: [employer]

## 2021-05-11 ENCOUNTER — TELEPHONE (OUTPATIENT)
Dept: FAMILY MEDICINE | Facility: CLINIC | Age: 51
End: 2021-05-11

## 2021-05-11 NOTE — PATIENT INSTRUCTIONS
I refilled your medications and I sent in the prescription for the methylprednisolone (steroids) for your allergies.     I will let you know if/when I hear back from the allergist.     I ordered a mammogram for you, you can call to schedule that anytime. Your last one was January 2019 so you're due anytime.     I recommend you start on a Vitamin D supplement of 2000 units (50 mcg) daily.     I also recommend you try increasing your allergy dose to twice the usual dose, either all at once or 1 pill twice daily, while your allergies are so bad.     There is another medication called Singulair that we didn't discuss. This is something you can take once daily for allergies in addition to antihistamines. If you still don't get good relief with what we're doing, let me know and we'll try you on Singulair.     Your colonoscopy report shows that you need to have this repeated in 3 years, so that will be due in 2023.

## 2021-05-12 RX ORDER — TRAZODONE HYDROCHLORIDE 100 MG/1
TABLET ORAL
Qty: 30 TABLET | Refills: 0 | OUTPATIENT
Start: 2021-05-12

## 2021-07-06 ENCOUNTER — OFFICE VISIT (OUTPATIENT)
Dept: ALLERGY | Facility: OTHER | Age: 51
End: 2021-07-06
Payer: COMMERCIAL

## 2021-07-06 VITALS
DIASTOLIC BLOOD PRESSURE: 77 MMHG | HEART RATE: 86 BPM | WEIGHT: 151 LBS | BODY MASS INDEX: 20.45 KG/M2 | HEIGHT: 72 IN | SYSTOLIC BLOOD PRESSURE: 137 MMHG | OXYGEN SATURATION: 98 %

## 2021-07-06 DIAGNOSIS — H10.9 RHINOCONJUNCTIVITIS: Primary | ICD-10-CM

## 2021-07-06 DIAGNOSIS — T78.1XXD PFAS (POLLEN-FOOD ALLERGY SYNDROME), SUBSEQUENT ENCOUNTER: ICD-10-CM

## 2021-07-06 DIAGNOSIS — J31.0 RHINOCONJUNCTIVITIS: Primary | ICD-10-CM

## 2021-07-06 DIAGNOSIS — T78.1XXA ALLERGIC REACTION TO TREE NUT: ICD-10-CM

## 2021-07-06 PROCEDURE — 86008 ALLG SPEC IGE RECOMB EA: CPT | Mod: 59 | Performed by: ALLERGY & IMMUNOLOGY

## 2021-07-06 PROCEDURE — 86003 ALLG SPEC IGE CRUDE XTRC EA: CPT | Performed by: ALLERGY & IMMUNOLOGY

## 2021-07-06 PROCEDURE — 36415 COLL VENOUS BLD VENIPUNCTURE: CPT | Performed by: ALLERGY & IMMUNOLOGY

## 2021-07-06 PROCEDURE — 99204 OFFICE O/P NEW MOD 45 MIN: CPT | Performed by: ALLERGY & IMMUNOLOGY

## 2021-07-06 RX ORDER — AZELASTINE 1 MG/ML
2 SPRAY, METERED NASAL 2 TIMES DAILY
Qty: 30 ML | Refills: 3 | Status: SHIPPED | OUTPATIENT
Start: 2021-07-06 | End: 2022-08-05

## 2021-07-06 ASSESSMENT — MIFFLIN-ST. JEOR: SCORE: 1416.93

## 2021-07-06 NOTE — LETTER
7/6/2021         RE: Jennifer Reyes  [address] [facility] [id number]        Dear Colleague,    Thank you for referring your patient, Jennifer Reyes, to the [facility]. Please see a copy of my visit note below.    Jennifer Reyes is a 50 year old White female with previous medical history significant for allergic rhinitis, oral allergy syndrome, and tree nut allergies. Jennifer Reyes is being seen today for evaluation of allergies to food and seasonal allergies.     The patient reports that she has a history of spring and summer allergy symptoms including rhinorrhea, sneezing, congestion, post nasal drip, ocular itching, ocular watering and ocular redness. Increased symptoms around cats. Allergy testing years ago but she does not recall results. She was on allergy shots for a short period of time. Unclear if effective. Symptoms in spring are significant and persist despite Flonase 2 sprays/nostril daily, Patanol 1 drop/eye daily and oral antihistamine.     She reports that with raw carrots, apples, cherries, pears she will develop scalp itching, mouth itching, throat itching and ear itching. Symptoms will persist for 2 hours and resolve.     Patient reports that with tree nuts she has developed shortness of breath and throat tightness. She has had to be intubated secondary to tree nut allergy in the past. She does carry an EpiPen. Avoiding peanuts as well.     ENVIRONMENTAL HISTORY: [place] The home is heated with a forced air and gas furnace. They do have central air conditioning. The patient's bedroom is furnished with carpeting in bedroom.  Pets inside the house include 2 dog(s). There is no history of cockroach or mice infestation. There is/are 0 smokers in the house.  The house does not have a damp basement.       Past Medical History:   Diagnosis Date     Anemia, unspecified      Anxiety state, unspecified       Apnea      Contact dermatitis and other eczema due to drugs and medicines in contact with skin 05/16/2007    Allergic reaction to lavendar aroma oil.     Contact dermatitis and other eczema due to plants (except food)      Depressive disorder, not elsewhere classified     had since 15, well controlled     Depressive disorder, not elsewhere classified 11/09/08    Suicidal ideation - [facility] admission     Displacement of lumbar intervertebral disc without myelopathy     L4-5     Duodenitis with hemorrhage 11/09/08     Erythema multiforme 07/22/2005    With secondary cellulitis, poison ivy rxn.  [facility] admit.     Lumbago 02/20/10    Transfer to [facility]     Myocardial infarction (H) 2010    idiopathic cardiomyopathy     Other pulmonary insufficiency, not elsewhere classified     Vocal cord dysfunction     Poisoning and toxic reactions caused by other plants      Pulmonary insufficiency following trauma and surgery 05/28/06    Admit.Discharged 06/01/06     Syncope 7/7/10    Elevated troponin, transfer to [facility]     Syncope and collapse     due to hypotension     Family History   Problem Relation Age of Onset     Anxiety Disorder Father      Heart Failure Father      Depression Father      Alcohol/Drug Father      Anxiety Disorder Brother      Bipolar Disorder Sister      Suicide Paternal Uncle      Heart Failure Mother      Breast Cancer Maternal Aunt      Cancer Maternal Grandfather         stomach     Cancer Paternal Grandmother         stomach     Depression Brother      Past Surgical History:   Procedure Laterality Date     C LUMBAR SPINE FUSION,ANTER APPRCH  2012    L4 and L5 to S1     COLONOSCOPY  10/2020    Large sessile serrated adenoma, repeat 3 years     HC DEBRIDE SKIN/SUBQ TISSUE  11/16/2009    Post-op wound     HC INJ EPIDURAL LUMBAR/SACRAL W/WO CONTRAST  2009    Left L4-5     HC LAP,FULGURATE/EXCISE LESIONS  02/27/2007    Dx lap, fulguration of endometriosis.  [facility]     HC REMOVAL OF  TONSILS,<11 Y/O  Age 7 or 8     HC TOOTH EXTRACTION W/FORCEP  Age 17    [place] teeth extraction x4.     HYSTERECTOMY      approx 2008 - ovaries still in, unsure about cervix. no cancer. had fibroids.     HYSTERECTOMY, PAP NO LONGER INDICATED       LAMINECT/DISCECTOMY, LUMBAR  10/24/2009     TUBAL LIGATION  02/27/2007    [facility] UGI ENDOSCOPY, SIMPLE EXAM  11/08/2008       REVIEW OF SYSTEMS:  General: negative for weight gain. negative for weight loss. negative for changes in sleep.   Ears: negative for fullness. negative for hearing loss. negative for dizziness.   Nose: negative for snoring.negative for changes in smell. positive  for drainage.   Eyes: positive  for eye watering. positive  for eye itching. negative for vision changes. negative for eye redness.  Throat: negative for hoarseness. negative for sore throat. negative for trouble swallowing.   Lungs: negative for shortness of breath.negative for wheezing. negative for sputum production.   Cardiovascular: negative for chest pain. negative for swelling of ankles. negative for fast or irregular heartbeat.   Gastrointestinal: negative for nausea. negative for heartburn. negative for acid reflux.   Musculoskeletal: negative for joint pain. negative for joint stiffness. negative for joint swelling.   Neurologic: negative for seizures. negative for fainting. negative for weakness.   Psychiatric: negative for changes in mood. negative for anxiety.   Endocrine: negative for cold intolerance. negative for heat intolerance. negative for tremors.   Lymphatic: negative for lower extremity swelling. negative for lymph node swelling.   Hematologic: negative for easy bruising. negative for easy bleeding.  Integumentary: positive  for rash. negative for scaling. negative for nail changes.       Current Outpatient Medications:      azelastine (ASTELIN) 0.1 % nasal spray, Spray 2 sprays into both nostrils 2 times daily, Disp: 30 mL, Rfl: 3     EPINEPHrine (ANY BX  GENERIC EQUIV) 0.3 MG/0.3ML injection 2-pack, Inject 0.3 mLs (0.3 mg) into the muscle as needed for anaphylaxis, Disp: 0.6 mL, Rfl: 1     Fexofenadine HCl (ALLEGRA PO), , Disp: , Rfl:      fluticasone (FLONASE) 50 MCG/ACT nasal spray, Spray 2 sprays into both nostrils At Bedtime, Disp: 16 g, Rfl: 5     methylPREDNISolone (MEDROL) 4 MG tablet, Take 6 tablets by mouth daily for 2 days, then 5 tablets daily by mouth for 2 days, then 4 tablets by mouth daily for 2 days, then 3 tablets daily, Disp: 120 tablet, Rfl: 1     traZODone (DESYREL) 100 MG tablet, Take 0.5-1 tablets ( mg) by mouth nightly as needed for sleep Appointment needed for additional refills., Disp: 90 tablet, Rfl: 3     venlafaxine (EFFEXOR-XR) 150 MG 24 hr capsule, TAKE 1 CAPSULE BY MOUTH EVERY DAY, Disp: 90 capsule, Rfl: 3     vitamin D3 (CHOLECALCIFEROL) 50 mcg (2000 units) tablet, Take 1 tablet (50 mcg) by mouth daily, Disp:  , Rfl:   Immunization History   Administered Date(s) Administered     COVID-19,PF,Pfizer 04/13/2021, 05/13/2021     Influenza (H1N1) 01/26/2010     Influenza (IIV3) PF 11/09/2005, 11/14/2007, 10/22/2009, 01/02/2013     Influenza Vaccine IM > 6 months Valent IIV4 10/24/2014, 10/02/2015, 10/05/2018     TD (ADULT, 7+) 02/01/2005     TDAP Vaccine (Adacel) 06/25/2008     Td (Adult), Adsorbed 02/01/2005, 10/05/2018     Allergies   Allergen Reactions     Banana Hives     Same with kiwi     Cats      Morphine Hives     Peanuts [Nuts]      Patient states she is allergic to all tree nuts.     Rocephin [Ceftriaxone]          EXAM:   Constitutional:  Appears well-developed and well-nourished. No distress.   HEENT:   Head: Normocephalic.   Nasal tissue pink and normal appearing.  No rhinorrhea noted.    Eyes: Conjunctivae are non-erythematous   Cardiovascular: Normal rate, regular rhythm and normal heart sounds. Exam reveals no gallop and no friction rub.   No murmur heard.  Respiratory: Effort normal and breath sounds normal. No  respiratory distress. No wheezes. No rales.   Musculoskeletal: Normal range of motion.   Neuro: Oriented to person, place, and time.  Skin: Skin is warm and dry. No rash noted.   Psychiatric: Normal mood and affect.     Nursing note and vitals reviewed.    ASSESSMENT/PLAN:  Problem List Items Addressed This Visit        Immune    Allergic reaction to tree nut     Anaphylaxis requiring intubation secondary to tree nuts.     Serum IgE for tree nuts and peanut. She is avoiding peanut as well.     - An anaphylaxis action plan was given and reviewed with patient and family.   - Injectable epinephrine use was reviewed and demonstrated. The patient will need to carry injectable epinephrine.   - Injectable epinephrine script provided.            Relevant Medications    azelastine (ASTELIN) 0.1 % nasal spray    Other Relevant Orders    Allergen peanut IgE (Completed)    Peanut Component Allergy Panel (Completed)    Allergen almonds IgE (Completed)    Allergen hazelnut IgE (Completed)    Allergen cashew IgE (Completed)    Allergen pecan nut IgE (Completed)    Allergen pistachio nut IgE (Completed)    Allergen walnuts IgE (Completed)       Infectious/Inflammatory    Rhinoconjunctivitis - Primary     Spring and summer nasal and ocular symptoms. Symptoms persist despite Flonase, oral antihistamine and Patanol.     - Serum IgE for environmental allergens.   -Continue Flonase 2 sprays per nostril daily.  -Allegra, Zyrtec, Xyzal or Claritin once to twice daily.  -Astelin 2 sprays per nostril twice daily as needed.  -Pataday extra strength 1 drop per eye daily as needed.  -Consider allergy shots.          Relevant Medications    azelastine (ASTELIN) 0.1 % nasal spray    Other Relevant Orders    Allergen cat epithellium IgE (Completed)    Allergen dog epithelium IgE (Completed)    Allergen jojo IgE (Completed)    Allergen D pteronyssinus IgE (Completed)    Allergen D farinae IgE (Completed)    Allergen alternaria alternata IgE  (Completed)    Allergen aspergillus fumigatus IgE (Completed)    Allergen cladosporium herbarum IgE (Completed)    Allergen Epicoccum purpurascens IgE (Completed)    Allergen penicillium notatum IgE (Completed)    Allergen cristhian white IgE (Completed)    Allergen [place] IgE (Completed)    Allergen cottonwood IgE (Completed)    Allergen elm IgE (Completed)    Allergen maple box elder IgE (Completed)    Allergen oak white IgE (Completed)    Allergen Red [place] IgE (Completed)    Allergen silver  birch IgE (Completed)    Allergen Tree White [place] IgE (Completed)    Allergen white pine IgE (Completed)    Allergen Toi grass IgE (Completed)    Allergen English plantain IgE (Completed)    Allergen lamb's quarter IgE (Completed)    Allergen giant ragweed IgE (Completed)    Allergen ragweed short IgE (Completed)    Allergen Mugwort IgE (Completed)    Allergen Sagebrush Wormwood IgE (Completed)    Allergen Sheep Sorrel IgE (Completed)    Allergen Weed Nettle IgE (Completed)    Allergen thistle Russian IgE (Completed)    Allergen, Kochia/Firebush (Completed)       Other    PFAS (pollen-food allergy syndrome), subsequent encounter     The patient has oral allergy syndrome (otherwise known as food pollen syndrome) which is secondary to pollen cross reactivity as opposed to true IgE mediated allergy. This can occur in some uncooked fruits and vegetables, but does not occur with cooked fruits and vegetables. Risk of systemic reaction is low. The patient should avoid raw fruits and vegetables that cause symptoms, but okay to consume in the cooked form.            Relevant Medications    azelastine (ASTELIN) 0.1 % nasal spray          Chart documentation with Dragon Voice recognition Software. Although reviewed after completion, some words and grammatical errors may remain.    Piyush Lyn DO FAAAAI  Medical Director for Allergy/Immunology at [facility] and [place], [place]        Again, thank  you for allowing me to participate in the care of your patient.        Sincerely,        Piyush Lyn MD

## 2021-07-06 NOTE — PROGRESS NOTES
Jennifer Reyes is a 50 year old White female with previous medical history significant for allergic rhinitis, oral allergy syndrome, and tree nut allergies. Jennifer Reyes is being seen today for evaluation of allergies to food and seasonal allergies.     The patient reports that she has a history of spring and summer allergy symptoms including rhinorrhea, sneezing, congestion, post nasal drip, ocular itching, ocular watering and ocular redness. Increased symptoms around cats. Allergy testing years ago but she does not recall results. She was on allergy shots for a short period of time. Unclear if effective. Symptoms in spring are significant and persist despite Flonase 2 sprays/nostril daily, Patanol 1 drop/eye daily and oral antihistamine.     She reports that with raw carrots, apples, cherries, pears she will develop scalp itching, mouth itching, throat itching and ear itching. Symptoms will persist for 2 hours and resolve.     Patient reports that with tree nuts she has developed shortness of breath and throat tightness. She has had to be intubated secondary to tree nut allergy in the past. She does carry an EpiPen. Avoiding peanuts as well.     ENVIRONMENTAL HISTORY: [place] The home is heated with a forced air and gas furnace. They do have central air conditioning. The patient's bedroom is furnished with carpeting in bedroom.  Pets inside the house include 2 dog(s). There is no history of cockroach or mice infestation. There is/are 0 smokers in the house.  The house does not have a damp basement.       Past Medical History:   Diagnosis Date     Anemia, unspecified      Anxiety state, unspecified      Apnea      Contact dermatitis and other eczema due to drugs and medicines in contact with skin 05/16/2007    Allergic reaction to lavendar aroma oil.     Contact dermatitis and other eczema due to plants (except food)      Depressive disorder, not elsewhere  classified     had since 15, well controlled     Depressive disorder, not elsewhere classified 11/09/08    Suicidal ideation - [facility] admission     Displacement of lumbar intervertebral disc without myelopathy     L4-5     Duodenitis with hemorrhage 11/09/08     Erythema multiforme 07/22/2005    With secondary cellulitis, poison ivy rxn.  [facility] admit.     Lumbago 02/20/10    Transfer to [facility]     Myocardial infarction (H) 2010    idiopathic cardiomyopathy     Other pulmonary insufficiency, not elsewhere classified     Vocal cord dysfunction     Poisoning and toxic reactions caused by other plants      Pulmonary insufficiency following trauma and surgery 05/28/06    Admit.Discharged 06/01/06     Syncope 7/7/10    Elevated troponin, transfer to [facility]     Syncope and collapse     due to hypotension     Family History   Problem Relation Age of Onset     Anxiety Disorder Father      Heart Failure Father      Depression Father      Alcohol/Drug Father      Anxiety Disorder Brother      Bipolar Disorder Sister      Suicide Paternal Uncle      Heart Failure Mother      Breast Cancer Maternal Aunt      Cancer Maternal Grandfather         stomach     Cancer Paternal Grandmother         stomach     Depression Brother      Past Surgical History:   Procedure Laterality Date     C LUMBAR SPINE FUSION,ANTER APPRCH  2012    L4 and L5 to S1     COLONOSCOPY  10/2020    Large sessile serrated adenoma, repeat 3 years     HC DEBRIDE SKIN/SUBQ TISSUE  11/16/2009    Post-op wound     HC INJ EPIDURAL LUMBAR/SACRAL W/WO CONTRAST  2009    Left L4-5     HC LAP,FULGURATE/EXCISE LESIONS  02/27/2007    Dx lap, fulguration of endometriosis.  [facility]     HC REMOVAL OF TONSILS,<11 Y/O  Age 7 or 8     HC TOOTH EXTRACTION W/FORCEP  Age 17    [place] teeth extraction x4.     HYSTERECTOMY      approx 2008 - ovaries still in, unsure about cervix. no cancer. had fibroids.     HYSTERECTOMY, PAP NO LONGER INDICATED        LAMINECT/DISCECTOMY, LUMBAR  10/24/2009     TUBAL LIGATION  02/27/2007    [facility] UGI ENDOSCOPY, SIMPLE EXAM  11/08/2008       REVIEW OF SYSTEMS:  General: negative for weight gain. negative for weight loss. negative for changes in sleep.   Ears: negative for fullness. negative for hearing loss. negative for dizziness.   Nose: negative for snoring.negative for changes in smell. positive  for drainage.   Eyes: positive  for eye watering. positive  for eye itching. negative for vision changes. negative for eye redness.  Throat: negative for hoarseness. negative for sore throat. negative for trouble swallowing.   Lungs: negative for shortness of breath.negative for wheezing. negative for sputum production.   Cardiovascular: negative for chest pain. negative for swelling of ankles. negative for fast or irregular heartbeat.   Gastrointestinal: negative for nausea. negative for heartburn. negative for acid reflux.   Musculoskeletal: negative for joint pain. negative for joint stiffness. negative for joint swelling.   Neurologic: negative for seizures. negative for fainting. negative for weakness.   Psychiatric: negative for changes in mood. negative for anxiety.   Endocrine: negative for cold intolerance. negative for heat intolerance. negative for tremors.   Lymphatic: negative for lower extremity swelling. negative for lymph node swelling.   Hematologic: negative for easy bruising. negative for easy bleeding.  Integumentary: positive  for rash. negative for scaling. negative for nail changes.       Current Outpatient Medications:      azelastine (ASTELIN) 0.1 % nasal spray, Spray 2 sprays into both nostrils 2 times daily, Disp: 30 mL, Rfl: 3     EPINEPHrine (ANY BX GENERIC EQUIV) 0.3 MG/0.3ML injection 2-pack, Inject 0.3 mLs (0.3 mg) into the muscle as needed for anaphylaxis, Disp: 0.6 mL, Rfl: 1     Fexofenadine HCl (ALLEGRA PO), , Disp: , Rfl:      fluticasone (FLONASE) 50 MCG/ACT nasal spray, Spray 2  sprays into both nostrils At Bedtime, Disp: 16 g, Rfl: 5     methylPREDNISolone (MEDROL) 4 MG tablet, Take 6 tablets by mouth daily for 2 days, then 5 tablets daily by mouth for 2 days, then 4 tablets by mouth daily for 2 days, then 3 tablets daily, Disp: 120 tablet, Rfl: 1     traZODone (DESYREL) 100 MG tablet, Take 0.5-1 tablets ( mg) by mouth nightly as needed for sleep Appointment needed for additional refills., Disp: 90 tablet, Rfl: 3     venlafaxine (EFFEXOR-XR) 150 MG 24 hr capsule, TAKE 1 CAPSULE BY MOUTH EVERY DAY, Disp: 90 capsule, Rfl: 3     vitamin D3 (CHOLECALCIFEROL) 50 mcg (2000 units) tablet, Take 1 tablet (50 mcg) by mouth daily, Disp:  , Rfl:   Immunization History   Administered Date(s) Administered     COVID-19,PF,Pfizer 04/13/2021, 05/13/2021     Influenza (H1N1) 01/26/2010     Influenza (IIV3) PF 11/09/2005, 11/14/2007, 10/22/2009, 01/02/2013     Influenza Vaccine IM > 6 months Valent IIV4 10/24/2014, 10/02/2015, 10/05/2018     TD (ADULT, 7+) 02/01/2005     TDAP Vaccine (Adacel) 06/25/2008     Td (Adult), Adsorbed 02/01/2005, 10/05/2018     Allergies   Allergen Reactions     Banana Hives     Same with kiwi     Cats      Morphine Hives     Peanuts [Nuts]      Patient states she is allergic to all tree nuts.     Rocephin [Ceftriaxone]          EXAM:   Constitutional:  Appears well-developed and well-nourished. No distress.   HEENT:   Head: Normocephalic.   Nasal tissue pink and normal appearing.  No rhinorrhea noted.    Eyes: Conjunctivae are non-erythematous   Cardiovascular: Normal rate, regular rhythm and normal heart sounds. Exam reveals no gallop and no friction rub.   No murmur heard.  Respiratory: Effort normal and breath sounds normal. No respiratory distress. No wheezes. No rales.   Musculoskeletal: Normal range of motion.   Neuro: Oriented to person, place, and time.  Skin: Skin is warm and dry. No rash noted.   Psychiatric: Normal mood and affect.     Nursing note and vitals  reviewed.    ASSESSMENT/PLAN:  Problem List Items Addressed This Visit        Immune    Allergic reaction to tree nut     Anaphylaxis requiring intubation secondary to tree nuts.     Serum IgE for tree nuts and peanut. She is avoiding peanut as well.     - An anaphylaxis action plan was given and reviewed with patient and family.   - Injectable epinephrine use was reviewed and demonstrated. The patient will need to carry injectable epinephrine.   - Injectable epinephrine script provided.            Relevant Medications    azelastine (ASTELIN) 0.1 % nasal spray    Other Relevant Orders    Allergen peanut IgE (Completed)    Peanut Component Allergy Panel (Completed)    Allergen almonds IgE (Completed)    Allergen hazelnut IgE (Completed)    Allergen cashew IgE (Completed)    Allergen pecan nut IgE (Completed)    Allergen pistachio nut IgE (Completed)    Allergen walnuts IgE (Completed)       Infectious/Inflammatory    Rhinoconjunctivitis - Primary     Spring and summer nasal and ocular symptoms. Symptoms persist despite Flonase, oral antihistamine and Patanol.     - Serum IgE for environmental allergens.   -Continue Flonase 2 sprays per nostril daily.  -Allegra, Zyrtec, Xyzal or Claritin once to twice daily.  -Astelin 2 sprays per nostril twice daily as needed.  -Pataday extra strength 1 drop per eye daily as needed.  -Consider allergy shots.          Relevant Medications    azelastine (ASTELIN) 0.1 % nasal spray    Other Relevant Orders    Allergen cat epithellium IgE (Completed)    Allergen dog epithelium IgE (Completed)    Allergen jojo IgE (Completed)    Allergen D pteronyssinus IgE (Completed)    Allergen D farinae IgE (Completed)    Allergen alternaria alternata IgE (Completed)    Allergen aspergillus fumigatus IgE (Completed)    Allergen cladosporium herbarum IgE (Completed)    Allergen Epicoccum purpurascens IgE (Completed)    Allergen penicillium notatum IgE (Completed)    Allergen cristhian white IgE  (Completed)    Allergen [place] IgE (Completed)    Allergen cottonwood IgE (Completed)    Allergen elm IgE (Completed)    Allergen maple box elder IgE (Completed)    Allergen oak white IgE (Completed)    Allergen Red [place] IgE (Completed)    Allergen silver  birch IgE (Completed)    Allergen Tree White [place] IgE (Completed)    Allergen white pine IgE (Completed)    Allergen Toi grass IgE (Completed)    Allergen English plantain IgE (Completed)    Allergen lamb's quarter IgE (Completed)    Allergen giant ragweed IgE (Completed)    Allergen ragweed short IgE (Completed)    Allergen Mugwort IgE (Completed)    Allergen Sagebrush [place]orm[place] IgE (Completed)    Allergen Sheep Sorrel IgE (Completed)    Allergen Weed Nettle IgE (Completed)    Allergen thistle Russian IgE (Completed)    Allergen, Kochia/Firebush (Completed)       Other    PFAS (pollen-food allergy syndrome), subsequent encounter     The patient has oral allergy syndrome (otherwise known as food pollen syndrome) which is secondary to pollen cross reactivity as opposed to true IgE mediated allergy. This can occur in some uncooked fruits and vegetables, but does not occur with cooked fruits and vegetables. Risk of systemic reaction is low. The patient should avoid raw fruits and vegetables that cause symptoms, but okay to consume in the cooked form.            Relevant Medications    azelastine (ASTELIN) 0.1 % nasal spray          Chart documentation with Dragon Voice recognition Software. Although reviewed after completion, some words and grammatical errors may remain.    Piyush Lyn DO FAAAAI  Medical Director for Allergy/Immunology at [place], [place]

## 2021-07-06 NOTE — ASSESSMENT & PLAN NOTE
Spring and summer nasal and ocular symptoms. Symptoms persist despite Flonase, oral antihistamine and Patanol.     - Serum IgE for environmental allergens.   -Continue Flonase 2 sprays per nostril daily.  -Allegra, Zyrtec, Xyzal or Claritin once to twice daily.  -Astelin 2 sprays per nostril twice daily as needed.  -Pataday extra strength 1 drop per eye daily as needed.  -Consider allergy shots.

## 2021-07-06 NOTE — ASSESSMENT & PLAN NOTE
The patient has oral allergy syndrome (otherwise known as food pollen syndrome) which is secondary to pollen cross reactivity as opposed to true IgE mediated allergy. This can occur in some uncooked fruits and vegetables, but does not occur with cooked fruits and vegetables. Risk of systemic reaction is low. The patient should avoid raw fruits and vegetables that cause symptoms, but okay to consume in the cooked form.

## 2021-07-06 NOTE — PATIENT INSTRUCTIONS
Allergy Staff Appt Hours Shot Hours Locations    Physician     Piyush Lyn DO       Support Staff     LEONIDAS Hawkins CMA  Tuesday:   [place] 7-5 Wednesday:  [place]: 7-5 Thursday:                    [place] 7-6     Friday:  [place]  7-2   [place]        Thursday: 7-5:20        Friday: 7-12:20     [place]        Tuesday: 7- 3:20 Wednesday: 7-4:20 [place] Monday: 7-2:20 Tuesday: 9-5:20         [facility]  [address] [place] [address]  Appt Line: [phone]      [facility]  [address] [place] [address]  Appt Line: [phone]         Important Scheduling Information  Aspirin Desensitization: Appt will last 2 clinic days. Please call the Allergy RN line for your clinic to schedule. Discontinue antihistamines 7 days prior to the appointment.     Food Challenges: Appt will last 3-4 hours. Please call the Allergy RN line for your clinic to schedule. Discontinue antihistamines 7 days prior to the appointment.     Penicillin Testing: Appt will last 2-3 hours. Please call the Allergy RN line for your clinic to schedule. Discontinue antihistamines 7 days prior to the appointment.     Skin Testing: Appt will about 40 minutes. Call the appointment line for your clinic to schedule. Discontinue antihistamines 7 days prior to the appointment.     Venom Testing: Appt will last 2-3 hours. Please call the Allergy RN line for your clinic to schedule. Discontinue antihistamines 7 days prior to the appointment.     Thank you for trusting us with your Allergy, Asthma, and Immunology care. Please feel free to contact us with any questions or concerns you may have.      -Continue Flonase 2 sprays per nostril daily.  -Allegra, Zyrtec, Xyzal or Claritin once to twice daily.  -Astelin 2 sprays per nostril twice daily as needed.  -Pataday extra strength 1 drop per eye daily as needed.  -Continue to avoid tree nuts and peanut.  -Allergy blood testing today for  environmental allergens and for peanut/tree nuts.  -See anaphylaxis action plan.

## 2021-07-06 NOTE — ASSESSMENT & PLAN NOTE
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     Serum IgE for tree nuts and peanut. She is avoiding peanut as well.     - An anaphylaxis action plan was given and reviewed with patient and family.   - Injectable epinephrine use was reviewed and demonstrated. The patient will need to carry injectable epinephrine.   - Injectable epinephrine script provided.

## 2021-07-06 NOTE — NURSING NOTE
RN reviewed Anaphylaxis Action Plan with patient. Educated on the symptoms and treatment of anaphylaxis. Went through the different ways that a reaction can present, and the body systems that it can affect. Patient verbalized understanding.     Yoly Kinney RN

## 2021-07-06 NOTE — LETTER
[facility]                   FOOD ALLERGY & ANAPHYLAXIS EMERGENCY CARE PLAN  Food Allergy Research & Education         Name: Jennifer KATIUSKA THOMSONO.B.:  [id number]    Allergy to: Tree nuts and peanut  Weight: 151 lbs 0 oz lbs.  Asthma:  No    -NOTE: Do not depend on antihistamines or inhalers (bronchodilators) to treat a severe reaction. USE EPINEPHRINE.     MEDICATIONS/DOSES  Epinephrine Brand: EpiPen  Epinephrine Dose: 0.3 mg IM  Antihistamine Brand or Generic: Zyrtec (Cetirizine)  Antihistamine Dose: 10mg         FARE                   FOOD ALLERGY & ANAPHYLAXIS EMERGENCY CARE PLAN   Food Allergy Research & Education           EMERGENCY CONTACTS - CALL 911  DOCTOR:  Piyush Lyn MD   PHONE: [phone]  PARENT/GUARDIAN:              PHONE:  OTHER EMERGENCY CONTACTS  NAME/RELATIONSHIP:   PHONE:   NAME/RELATIONSHIP:    PHONE:           PARENT/GUARDIAN AUTHORIZATION SIGNATURE     DATE              PHYSICIAN/H CP AUTHORIZATION SIGNATURE         DATE  FORM PROVIDED COURTESY OF FOOD ALLERGY RESEARCH & EDUCATION (FARE) ([email or web address]) 2014

## 2021-07-08 LAB
A FUMIGATUS IGE QN: <0.1 KU(A)/L
CEDAR IGE QN: 0.57 KU(A)/L
D FARINAE IGE QN: <0.1 KU(A)/L
DOG DANDER+EPITH IGE QN: 1.65 KU(A)/L
EAST WHITE PINE IGE QN: 0.27 KU(A)/L
ENGL PLANTAIN IGE QN: 0.41 KU(A)/L
FIREBUSH IGE QN: 0.76 KU(A)/L
GOOSEFOOT IGE QN: 0.39 KU(A)/L
HAZELNUT IGE QN: 9.7 KU(A)/L
JOHNSON GRASS IGE QN: 3.9 KU(A)/L
PECAN/HICK NUT IGE QN: <0.1 KU(A)/L
WALNUT IGE QN: 0.78 KU(A)/L
WHITE MULBERRY IGE QN: 0.2 KU(A)/L
WORMWOOD IGE QN: 0.26 KU(A)/L

## 2021-07-09 LAB
A ALTERNATA IGE QN: 0.17 KU(A)/L
ALMOND IGE QN: 0.72 KU(A)/L
C HERBARUM IGE QN: <0.1 KU(A)/L
CASHEW NUT IGE QN: <0.1 KU(A)/L
CAT DANDER IGG QN: <0.1 KU(A)/L
COMMON RAGWEED IGE QN: 0.31 KU(A)/L
COTTONWOOD IGE QN: 0.36 KU(A)/L
D PTERONYSS IGE QN: <0.1 KU(A)/L
E PURPURASCENS IGE QN: 0.22 KU(A)/L
GIANT RAGWEED IGE QN: 0.25 KU(A)/L
MAPLE IGE QN: 0.78 KU(A)/L
MUGWORT IGE QN: 0.26 KU(A)/L
NETTLE IGE QN: 0.35 KU(A)/L
P NOTATUM IGE QN: <0.1 KU(A)/L
PEANUT (RARA H) 1 IGE QN: <0.1 KU(A)/L
PEANUT (RARA H) 2 IGE QN: <0.1 KU(A)/L
PEANUT (RARA H) 3 IGE QN: <0.1 KU(A)/L
PEANUT (RARA H) 8 IGE QN: 4.33 KU(A)/L
PEANUT (RARA H) 9 IGE QN: <0.1 KU(A)/L
PEANUT IGE QN: 1.04 KU(A)/L
PISTACHIO IGE QN: 0.47 KU(A)/L
RED MULBERRY IGE QN: 0.22 KU(A)/L
SALTWORT IGE QN: 0.99 KU(A)/L
SHEEP SORREL IGE QN: 0.35 KU(A)/L
SILVER BIRCH IGE QN: 17.1 KU(A)/L
TIMOTHY IGE QN: 11.1 KU(A)/L
WHITE ASH IGE QN: 1.34 KU(A)/L
WHITE ELM IGE QN: 1.14 KU(A)/L
WHITE OAK IGE QN: 11.4 KU(A)/L

## 2021-07-09 NOTE — RESULT ENCOUNTER NOTE
Allergy testing was positive for dog, grass, trees, weeds, molds, grass.  Positive for peanut but the majority of the protein is directed toward part of protein associated with oral allergy syndrome or mild reactions.  Testing positive for tree nuts.  Continue to avoid peanut and tree nuts.  Continue the plan as we discussed otherwise in office.  Continue to carry injectable epinephrine.    AEROALLERGEN AVOIDANCE INSTRUCTIONS  MOLD  Indoors, mold season is year round. Outdoors, most mold prefer seasons with high humidity. Mold prefers damp, dark, warm places. Here are some tips on how to avoid mold exposure.   Keep humidity inside between 35-50% with air conditioning or dehumidifier. The humidity level can be checked with a meter from a hardware store.    Clean surfaces where mold grows and dry wet areas.   Avoid steam cleaning carpets and discard moldy belongings.   Wear a mask when doing yard work and refrain from walking through uncut fields or playing in leaves.   Minimize use of potted plants and do not keep them indoors.   Consider an allergy cover for the pillow and mattress.  POLLEN  Pollens are the tiny airborne particles given off by trees, weeds, and grasses. They can be the cause of seasonal allergic rhinitis or hay fever symptoms, which include stuffy, itchy, runny nose, redness, swelling and itching of the eyes, and itching of the ears and throat. Here are some tips on how to avoid pollen exposure.  .Keep windows closed and use the air conditioner when possible.   Avoid outside exposure in the early morning as pollen counts are highest at that time.   Take a shower and wash hair each night.   Consider wearing a mask when working in the yard and/or garden.   Clean furnace filter monthly with HEPA filters. Consider a HEPA filter vacuum  which will prevent pollen from being reintroduced into the air.   DUST MITES  Dust mites can never be entirely eliminated in the house no matter how clean your  house is. Dust mites are attracted to warm, moist areas and feed on dead skin flakes. Here are tips to minimize dust mites in your home.   Encase pillows and mattress/box springs in zippered allergy covers.   Wash bedding in hot water (at least 130 F) every 7-14 days.   Avoid curtains, carpet, and upholstered furniture if possible.   Use HEPA air filters and a HEPA filter vacuum . Change filters monthly. Vacuum weekly.   Keep bedroom simple, avoiding clutter, so it can quickly be dusted.   Cover heating vents with vent filters.   Keep stuffed toys in a closed container and wash or freeze regularly.   Keep clothing in the closet with the door closed.  PETS  Pets present many problems for people with allergies. Dander from pets is very difficult to remove and also is a food source for dust mites.   If possible, find the pet a new home.   If not possible, keep the pet outdoors. Never allow the pet into the bedroom.   Wash pet weekly in warm water.   Encase mattresses, pillows, and box springs in allergen-proof covers.   Use HEPA air filters and a HEPA filter vacuum . Change filters monthly.

## 2021-07-31 DIAGNOSIS — H10.9 RHINOCONJUNCTIVITIS: ICD-10-CM

## 2021-07-31 DIAGNOSIS — J31.0 RHINOCONJUNCTIVITIS: ICD-10-CM

## 2021-08-02 RX ORDER — AZELASTINE 1 MG/ML
2 SPRAY, METERED NASAL 2 TIMES DAILY
Refills: 3 | OUTPATIENT
Start: 2021-08-02

## 2021-09-18 ENCOUNTER — HEALTH MAINTENANCE LETTER (OUTPATIENT)
Age: 51
End: 2021-09-18

## 2021-10-17 DIAGNOSIS — J30.1 SEASONAL ALLERGIC RHINITIS DUE TO POLLEN: ICD-10-CM

## 2021-10-20 RX ORDER — FLUTICASONE PROPIONATE 50 MCG
2 SPRAY, SUSPENSION (ML) NASAL AT BEDTIME
Qty: 48 ML | Refills: 1 | Status: SHIPPED | OUTPATIENT
Start: 2021-10-20 | End: 2022-08-05

## 2021-10-20 NOTE — TELEPHONE ENCOUNTER
Prescription approved per [facility] Refill Protocol.    Magda Herrera RN

## 2022-01-08 ENCOUNTER — HEALTH MAINTENANCE LETTER (OUTPATIENT)
Age: 52
End: 2022-01-08

## 2022-04-24 DIAGNOSIS — G47.00 PERSISTENT INSOMNIA: ICD-10-CM

## 2022-04-24 DIAGNOSIS — F32.2 MAJOR DEPRESSIVE DISORDER, SINGLE EPISODE, SEVERE WITHOUT PSYCHOTIC FEATURES (H): ICD-10-CM

## 2022-04-26 ENCOUNTER — MYC MEDICAL ADVICE (OUTPATIENT)
Dept: FAMILY MEDICINE | Facility: CLINIC | Age: 52
End: 2022-04-26
Payer: COMMERCIAL

## 2022-04-26 DIAGNOSIS — F32.2 MAJOR DEPRESSIVE DISORDER, SINGLE EPISODE, SEVERE (H): Primary | ICD-10-CM

## 2022-04-26 NOTE — TELEPHONE ENCOUNTER
Pending Prescriptions:                       Disp   Refills    traZODone (DESYREL) 100 MG tablet [Pharmac*90 tab*0        Sig: TAKE 1/2 TO 1 TAB BY MOUTH NIGHTLY AS NEEDED FOR           SLEEP APPOINTMENT NEEDED FOR ADDITIONAL REFILLS.      Routing refill request to provider for review/approval because:  Drug interaction warning    Li Lama RN

## 2022-05-01 RX ORDER — TRAZODONE HYDROCHLORIDE 100 MG/1
TABLET ORAL
Qty: 90 TABLET | Refills: 0 | Status: SHIPPED | OUTPATIENT
Start: 2022-05-01 | End: 2022-08-05

## 2022-06-11 DIAGNOSIS — F32.2 MAJOR DEPRESSIVE DISORDER, SINGLE EPISODE, SEVERE WITHOUT PSYCHOTIC FEATURES (H): ICD-10-CM

## 2022-06-14 NOTE — TELEPHONE ENCOUNTER
Pending Prescriptions:                       Disp   Refills    venlafaxine (EFFEXOR XR) 150 MG 24 hr caps*90 cap*0        Sig: TAKE 1 CAPSULE BY MOUTH EVERY DAY      Routing refill request to provider for review/approval because:  Drug interaction warning    Li Lama RN

## 2022-06-15 RX ORDER — VENLAFAXINE HYDROCHLORIDE 150 MG/1
CAPSULE, EXTENDED RELEASE ORAL
Qty: 90 CAPSULE | Refills: 0 | Status: SHIPPED | OUTPATIENT
Start: 2022-06-15 | End: 2022-08-05

## 2022-06-15 NOTE — TELEPHONE ENCOUNTER
I sent her a message to reschedule her appt she missed yesterday. Approving 3 month refill to give her time to get in. Also just sent in a referral for mental health.   Marni Montemayor MD

## 2022-08-05 ENCOUNTER — VIRTUAL VISIT (OUTPATIENT)
Dept: FAMILY MEDICINE | Facility: CLINIC | Age: 52
End: 2022-08-05
Payer: COMMERCIAL

## 2022-08-05 DIAGNOSIS — D69.6 THROMBOCYTOPENIA (H): ICD-10-CM

## 2022-08-05 DIAGNOSIS — J31.0 RHINOCONJUNCTIVITIS: ICD-10-CM

## 2022-08-05 DIAGNOSIS — F32.2 MAJOR DEPRESSIVE DISORDER, SINGLE EPISODE, SEVERE WITHOUT PSYCHOTIC FEATURES (H): Primary | ICD-10-CM

## 2022-08-05 DIAGNOSIS — G47.00 PERSISTENT INSOMNIA: ICD-10-CM

## 2022-08-05 DIAGNOSIS — H10.9 RHINOCONJUNCTIVITIS: ICD-10-CM

## 2022-08-05 DIAGNOSIS — F19.20 CHEMICAL DEPENDENCY (H): ICD-10-CM

## 2022-08-05 DIAGNOSIS — I42.9 IDIOPATHIC CARDIOMYOPATHY (H): ICD-10-CM

## 2022-08-05 DIAGNOSIS — J30.1 SEASONAL ALLERGIC RHINITIS DUE TO POLLEN: ICD-10-CM

## 2022-08-05 PROCEDURE — 99214 OFFICE O/P EST MOD 30 MIN: CPT | Mod: GT | Performed by: FAMILY MEDICINE

## 2022-08-05 RX ORDER — VENLAFAXINE HYDROCHLORIDE 150 MG/1
150 CAPSULE, EXTENDED RELEASE ORAL DAILY
Qty: 90 CAPSULE | Refills: 3 | Status: SHIPPED | OUTPATIENT
Start: 2022-08-05 | End: 2023-03-22

## 2022-08-05 RX ORDER — AZELASTINE 1 MG/ML
2 SPRAY, METERED NASAL 2 TIMES DAILY
Qty: 30 ML | Refills: 11 | Status: SHIPPED | OUTPATIENT
Start: 2022-08-05 | End: 2024-04-22

## 2022-08-05 RX ORDER — MONTELUKAST SODIUM 10 MG/1
10 TABLET ORAL AT BEDTIME
Qty: 30 TABLET | Refills: 5 | Status: SHIPPED | OUTPATIENT
Start: 2022-08-05 | End: 2023-06-07

## 2022-08-05 RX ORDER — TRAZODONE HYDROCHLORIDE 100 MG/1
TABLET ORAL
Qty: 90 TABLET | Refills: 1 | Status: SHIPPED | OUTPATIENT
Start: 2022-08-05 | End: 2023-01-10

## 2022-08-05 RX ORDER — FLUTICASONE PROPIONATE 50 MCG
2 SPRAY, SUSPENSION (ML) NASAL AT BEDTIME
Qty: 48 G | Refills: 3 | Status: SHIPPED | OUTPATIENT
Start: 2022-08-05 | End: 2024-04-22

## 2022-08-05 ASSESSMENT — PATIENT HEALTH QUESTIONNAIRE - PHQ9: SUM OF ALL RESPONSES TO PHQ QUESTIONS 1-9: 1

## 2022-08-05 NOTE — PATIENT INSTRUCTIONS
Jennifer,   It was good talking with you today, and I am sorry for what you have been dealing with.    I did refill your venlafaxine for the year.  I refilled your trazodone as well, but the pharmacy will keep that on hold until you need it.    I also sent in refills for your nasal spray and sent in a new prescription for the medication we discussed, called Singulair, or montelukast.  Start taking 1 of these pills every day for allergies.  Hopefully within the first 3 to 4 weeks you will notice an improvement in your allergy control.  You can safely take this year-round, or you may just take it once daily during the months when you are most bothered by your allergies.    Please plan to follow-up with me again in 6 months, around February, to review your medications again.  Please feel free to contact me sooner if things change or you need care.

## 2022-08-05 NOTE — PROGRESS NOTES
Jennifer is a 51 year old who is being evaluated via a billable video visit.      Video visit performed in lieu of an in-person visit due to current concerns regarding social distancing and keeping people safe.  Physician and patient agreed this was appropriate for concerns addressed.     How would you like to obtain your AVS? MyChart  If the video visit is dropped, the invitation should be resent by: Text to cell phone: [phone]  Will anyone else be joining your video visit? No    Assessment & Plan       ICD-10-CM    1. Major depressive disorder, single episode, severe without psychotic features (H)  F32.2 traZODone (DESYREL) 100 MG tablet     venlafaxine (EFFEXOR XR) 150 MG 24 hr capsule   2. Persistent insomnia  G47.00 traZODone (DESYREL) 100 MG tablet   3. Rhinoconjunctivitis  J31.0 montelukast (SINGULAIR) 10 MG tablet    H10.9 azelastine (ASTELIN) 0.1 % nasal spray   4. Seasonal allergic rhinitis due to pollen  J30.1 fluticasone (FLONASE) 50 MCG/ACT nasal spray   5. Chemical dependency (H)  F19.20    6. Thrombocytopenia (H)  D69.6    7. Idiopathic cardiomyopathy (H)  I42.8       For her anxiety, I did renew her Effexor and also her trazodone.  She does not need the trazodone at this time so prescription put on hold.  I am not making any changes in her dose of Effexor.  We will follow-up again in 6 months as long as things are stable, sooner as needed.    We briefly talked about her allergies as well, and I recommend starting Singulair in addition to her other therapies.  She will continue on Flonase and Astelin nasal spray.  She is taking a daily antihistamine.  She has already met with allergist, and had significant allergy testing and was told that allergy shots would likely not benefit her much.  She is not interested in pursuing that.    She does have a history of chemical dependency as well as thrombocytopenia and idiopathic cardiomyopathy.  It has been many years since she has had cardiology so not an  active problem.  Her thrombocytopenia has resolved so I am taking that off her list.  She does follow with a primary care provider for her annual physical and just had that done last month.  I did not address these problems today but need to consider when making treatment decisions.    Review of prior external note(s) from - Mayela Juarez - Dr. Ap Contreras  25 minutes spent on the date of the encounter doing chart review, history and exam, documentation and further activities per the note      Return in about 6 months (around 2/5/2023).    Marni Montemayor MD  [facility]    Nisha Rodriguez is a 51 year old, presenting for the following health issues:  No chief complaint on file.      HPI     Depression Followup    How are you doing with your depression since your last visit? No change    Are you having other symptoms that might be associated with depression? No    Have you had a significant life event?  No     Are you feeling anxious or having panic attacks?   No    Do you have any concerns with your use of alcohol or other drugs? No    Social History     Tobacco Use     Smoking status: Never Smoker     Smokeless tobacco: Never Used   Substance Use Topics     Alcohol use: Yes     Comment: socially     Drug use: No     PHQ 5/5/2020 5/10/2021 8/5/2022   PHQ-9 Total Score 1 1 1   Q9: Thoughts of better off dead/self-harm past 2 weeks Not at all Not at all Not at all     MIKE-7 SCORE 2/26/2019 5/5/2020   Total Score 5 3     Last PHQ-9 8/5/2022   1.  Little interest or pleasure in doing things 0   2.  Feeling down, depressed, or hopeless 0   3.  Trouble falling or staying asleep, or sleeping too much 0   4.  Feeling tired or having little energy 1   5.  Poor appetite or overeating 0   6.  Feeling bad about yourself 0   7.  Trouble concentrating 0   8.  Moving slowly or restless 0   Q9: Thoughts of better off dead/self-harm past 2 weeks 0   PHQ-9 Total Score 1   Difficulty at work,  home, or with people Not difficult at all       Suicide Assessment Five-step Evaluation and Treatment (SAFE-T)      How many servings of fruits and vegetables do you eat daily?  2-3    On average, how many sweetened beverages do you drink each day (Examples: soda, juice, sweet tea, etc.  Do NOT count diet or artificially sweetened beverages)?   0    How many days per week do you exercise enough to make your heart beat faster? 4    How many minutes a day do you exercise enough to make your heart beat faster? 60 or more    How many days per week do you miss taking your medication? 0    Overall she is doing okay, but has been dealing with some increased stress lately.  She is dealing with some issues with her youngest daughter who turns 18 soon.  She apparently has had some run-in with the law regarding marijuana and has an upcoming court date.  She also has an eating disorder and has been through treatment.  Her daughter wants to move out of the house, which might be good and bad, might lower Jennifer's stress level but also she is worried about her wellbeing.  She is remarried, her ex- does not have the insight to give input on management of their daughter because he does not spend all his time with her.  Her current  has a different philosophy of raising children and this is creating some difficulty in their marriage.  They have a strong marriage and will be starting some marriage counseling soon to help with this specific issue.  She has done therapy in the past for some other traumatic events in her life and it has worked well.    She does not feel depressed at all.  She joined a fitness club with another group of women in March and really enjoys it.  She is working out several days a week in the early morning hours.  Mostly she is just dealing with anxiety from the stresses.  She is using trazodone nightly and it works well.  She takes it about 30 minutes before bed and sleeps through until morning  when she wakes around 530 or 6 AM.  If she does not take it she will wake up at 2 AM and then she can take 1 then.    She is also dealing with significant allergy symptoms.  She is taking Flonase and Astelin since around [facility] [place] Day.  She also takes a daily antihistamine.  She has been seen by an allergist and had thorough testing and was told that allergy shots really will not help her because she is allergic to so many different outdoor allergens.    Review of Systems   Constitutional, HEENT, cardiovascular, pulmonary, gi and gu systems are negative, except as otherwise noted.      Objective           Vitals:  No vitals were obtained today due to virtual visit.    Physical Exam   GENERAL: Healthy, alert and no distress  EYES: Eyes grossly normal to inspection.  No discharge or erythema, or obvious scleral/conjunctival abnormalities.  RESP: No audible wheeze, cough, or visible cyanosis.  No visible retractions or increased work of breathing.    SKIN: Visible skin clear. No significant rash, abnormal pigmentation or lesions.  NEURO: Cranial nerves grossly intact.  Mentation and speech appropriate for age.  PSYCH: Mentation appears normal, affect normal/bright, judgement and insight intact, normal speech and appearance well-groomed.          Video-Visit Details    Video Start Time: 2:16 pm    Type of service:  Video Visit    Video End Time:2:35 pm    Originating Location (pt. Location): Home    Distant Location (provider location):  [facility]     Platform used for Video Visit: TabTale    David Hernandez.

## 2022-08-05 NOTE — ASSESSMENT & PLAN NOTE
History of thrombocytopenia, but recent platelet counts have been normal. Resolving problem in chart.

## 2022-09-06 DIAGNOSIS — J31.0 RHINOCONJUNCTIVITIS: ICD-10-CM

## 2022-09-06 DIAGNOSIS — H10.9 RHINOCONJUNCTIVITIS: ICD-10-CM

## 2022-09-09 RX ORDER — MONTELUKAST SODIUM 10 MG/1
TABLET ORAL
Qty: 30 TABLET | Refills: 5 | OUTPATIENT
Start: 2022-09-09

## 2022-09-09 NOTE — TELEPHONE ENCOUNTER
Sent 8/5/22, with 6 month supply. Should not need refills at this time    Li Lama RN on 9/9/2022 at 10:55 AM

## 2022-09-19 ENCOUNTER — HOSPITAL ENCOUNTER (EMERGENCY)
Facility: CLINIC | Age: 52
Discharge: HOME | End: 2022-09-19
Attending: NURSE PRACTITIONER | Admitting: NURSE PRACTITIONER
Payer: COMMERCIAL

## 2022-09-19 VITALS
TEMPERATURE: 98 F | DIASTOLIC BLOOD PRESSURE: 85 MMHG | RESPIRATION RATE: 18 BRPM | OXYGEN SATURATION: 99 % | HEART RATE: 85 BPM | BODY MASS INDEX: 21.02 KG/M2 | WEIGHT: 155 LBS | SYSTOLIC BLOOD PRESSURE: 132 MMHG

## 2022-09-19 DIAGNOSIS — M54.16 LUMBAR RADICULOPATHY: ICD-10-CM

## 2022-09-19 PROCEDURE — 99284 EMERGENCY DEPT VISIT MOD MDM: CPT | Performed by: NURSE PRACTITIONER

## 2022-09-19 RX ORDER — OXYCODONE HYDROCHLORIDE 5 MG/1
5 TABLET ORAL EVERY 6 HOURS PRN
Qty: 10 TABLET | Refills: 0 | Status: SHIPPED | OUTPATIENT
Start: 2022-09-19 | End: 2022-09-22

## 2022-09-19 RX ORDER — METHYLPREDNISOLONE 4 MG
TABLET, DOSE PACK ORAL
Qty: 21 TABLET | Refills: 0 | Status: ON HOLD | OUTPATIENT
Start: 2022-09-19 | End: 2022-10-04

## 2022-09-19 RX ORDER — CYCLOBENZAPRINE HCL 10 MG
10 TABLET ORAL 3 TIMES DAILY PRN
Qty: 20 TABLET | Refills: 0 | Status: SHIPPED | OUTPATIENT
Start: 2022-09-19 | End: 2022-09-25

## 2022-09-19 NOTE — ED TRIAGE NOTES
Doing yard work on Saturday and c/o severe back pain since then. Hx fusion in back and 'pain in same spot'.      Triage Assessment     Row Name 09/19/22 9121       Triage Assessment (Adult)    Airway WDL WDL       Respiratory WDL    Respiratory WDL WDL       Cardiac WDL    Cardiac WDL WDL

## 2022-09-19 NOTE — ED PROVIDER NOTES
History     Chief Complaint   Patient presents with     Back Pain     HPI  Jennifer Reyes is a 52 year old female who presents for evaluation of low back pain.  Symptoms started on Saturday, 2 days ago after doing yard work.  No fall.  Pain is mostly left-sided and radiates into her left leg.  Patient has history of lumbar fusion done in 2012.  She no longer follows with spine/orthopedics.  Denies lower extremity weakness or numbness.  Denies loss of bowel or bladder control.  Denies fevers.     Allergies:  Allergies   Allergen Reactions     Banana Hives     Same with kiwi     Cats      Morphine Hives     Peanuts [Nuts]      Patient states she is allergic to all tree nuts.     Rocephin [Ceftriaxone]        Problem List:    Patient Active Problem List    Diagnosis Date Noted     Persistent insomnia 08/05/2022     Priority: Medium     Seasonal allergic rhinitis due to pollen 08/05/2022     Priority: Medium     Thrombocytopenia (H) 08/05/2022     Priority: Medium     Rhinoconjunctivitis 07/06/2021     Priority: Medium     Allergic reaction to tree nut 07/06/2021     Priority: Medium     PFAS (pollen-food allergy syndrome), subsequent encounter 07/06/2021     Priority: Medium     Chemical dependency (H) 04/06/2015     Priority: Medium     Hx of opiate addiction, treatment 2015       Chronic low back pain 10/24/2014     Priority: Medium     Nayan UREÑA at [facility] [place].        CARDIOVASCULAR SCREENING; LDL GOAL LESS THAN 100 10/31/2010     Priority: Medium     Idiopathic cardiomyopathy (H) 07/20/2010     Priority: Medium     Major depressive disorder, single episode, severe without psychotic features (H) 11/20/2006     Priority: Medium     Stable on Effexor and Trazodone  Problem list name updated by automated process. Provider to review          Past Medical History:    Past Medical History:   Diagnosis Date     Anemia, unspecified      Anxiety state, unspecified      Apnea      Contact dermatitis and other  eczema due to drugs and medicines in contact with skin 05/16/2007     Contact dermatitis and other eczema due to plants (except food)      Depressive disorder, not elsewhere classified      Depressive disorder, not elsewhere classified 11/09/08     Displacement of lumbar intervertebral disc without myelopathy      Duodenitis with hemorrhage 11/09/08     Erythema multiforme 07/22/2005     Lumbago 02/20/10     Myocardial infarction (H) 2010     Other pulmonary insufficiency, not elsewhere classified      Poisoning and toxic reactions caused by other plants      Pulmonary insufficiency following trauma and surgery 05/28/06     Syncope 7/7/10     Syncope and collapse        Past Surgical History:    Past Surgical History:   Procedure Laterality Date     COLONOSCOPY  10/2020    Large sessile serrated adenoma, repeat 3 years     HC DEBRIDE SKIN/SUBQ TISSUE  11/16/2009    Post-op wound     HC INJ EPIDURAL LUMBAR/SACRAL W/WO CONTRAST  2009    Left L4-5     HC LAP,FULGURATE/EXCISE LESIONS  02/27/2007    Dx lap, fulguration of endometriosis.  [facility]     HC REMOVAL OF TONSILS,<13 Y/O  Age 7 or 8     HC TOOTH EXTRACTION W/FORCEP  Age 17    [place] teeth extraction x4.     HYSTERECTOMY      approx 2008 - ovaries still in, unsure about cervix. no cancer. had fibroids.     HYSTERECTOMY, PAP NO LONGER INDICATED       LAMINECT/DISCECTOMY, LUMBAR  10/24/2009     TUBAL LIGATION  02/27/2007    [facility] LUMBAR SPINE FUSION,ANTER APPRCH  2012    L4 and L5 to S1     [facility] UGI ENDOSCOPY, SIMPLE EXAM  11/08/2008       Family History:    Family History   Problem Relation Age of Onset     Anxiety Disorder Father      Heart Failure Father      Depression Father      Alcohol/Drug Father      Anxiety Disorder Brother      Bipolar Disorder Sister      Suicide Paternal Uncle      Heart Failure Mother      Breast Cancer Maternal Aunt      Cancer Maternal Grandfather         stomach     Cancer Paternal Grandmother         stomach      Depression Brother        Social History:  Marital Status:   [2]  Social History     Tobacco Use     Smoking status: Never Smoker     Smokeless tobacco: Never Used   Substance Use Topics     Alcohol use: Yes     Comment: socially     Drug use: No        Medications:    cyclobenzaprine (FLEXERIL) 10 MG tablet  methylPREDNISolone (MEDROL DOSEPAK) 4 MG tablet therapy pack  oxyCODONE (ROXICODONE) 5 MG tablet  azelastine (ASTELIN) 0.1 % nasal spray  EPINEPHrine (ANY BX GENERIC EQUIV) 0.3 MG/0.3ML injection 2-pack  Fexofenadine HCl (ALLEGRA PO)  fluticasone (FLONASE) 50 MCG/ACT nasal spray  montelukast (SINGULAIR) 10 MG tablet  traZODone (DESYREL) 100 MG tablet  venlafaxine (EFFEXOR XR) 150 MG 24 hr capsule  vitamin D3 (CHOLECALCIFEROL) 50 mcg (2000 units) tablet          Review of Systems  As mentioned above in the history present illness. All other systems were reviewed and are negative.    Physical Exam   BP: 132/85  Pulse: 85  Temp: 98  F (36.7  C)  Resp: 18  Weight: 70.3 kg (155 lb)  SpO2: 99 %      Physical Exam  Constitutional:       General: She is not in acute distress.     Appearance: Normal appearance. She is well-developed. She is not ill-appearing.   HENT:      Head: Normocephalic and atraumatic.      Right Ear: External ear normal.      Left Ear: External ear normal.      Nose: Nose normal.      Mouth/Throat:      Mouth: Mucous membranes are moist.   Eyes:      Conjunctiva/sclera: Conjunctivae normal.   Cardiovascular:      Rate and Rhythm: Normal rate and regular rhythm.      Heart sounds: Normal heart sounds. No murmur heard.  Pulmonary:      Effort: Pulmonary effort is normal. No respiratory distress.      Breath sounds: Normal breath sounds.   Musculoskeletal:      Lumbar back: Tenderness (lumbar region, more on the left and radiates down left leg) present. Positive left straight leg raise test. Negative right straight leg raise test.        Back:    Skin:     General: Skin is warm and dry.       Findings: No rash.   Neurological:      General: No focal deficit present.      Mental Status: She is alert and oriented to person, place, and time.         ED Course                 Procedures              No results found for this or any previous visit (from the past 24 hour(s)).    Medications - No data to display    Assessments & Plan (with Medical Decision Making)   52 year old female who presents for evaluation of low back pain.  Symptoms started on Saturday, 2 days ago after doing yard work.  No fall.  Pain is mostly left-sided and radiates into her left leg.  Patient has history of lumbar fusion done in 2012.  She no longer follows with spine/orthopedics.  Denies lower extremity weakness or numbness.  Denies loss of bowel or bladder control.  Denies fevers.  On exam she appears distressed and in pain.  Tenderness focal to the left SI joint and radiates into her left hip and down her left leg.  Positive low back pain with left straight leg lift.  Otherwise there is no red flag symptoms to warrant emergent MRI imaging.  She would like to have MRI imaging as an outpatient and I have ordered this for her.  Plan as follows:  Heat alternating with ice packs.  Medrol Dosepak (Steroid, per package instructions)  Flexeril 10 mg three times a day as needed (muscle relaxer).  Tylenol 650 mg every 4-6 hours as needed for pain.  Ibuprofen 400-600 mg every 6-8 hours as needed for pain  (take with food, stop if causing stomach pains.)  Oxycodone 5 mg every 6 hours as needed for moderate/severe pain.   --This is short term use, use sparingly, can be addictive.   --Use stool softeners to avoid constipation.   --Do not drive or drink alcohol on this medication.  Outpatient MRI ordered. Call [phone] to schedule.  Make appointment to see your regular doctor for recheck and to get results of your MRI.  I placed referral for Spine/Orthopedics.  Call [phone] to schedule appointment.  Return for fevers, loss of bowel or  bladder control, or any worsening symptoms/pain.           Discharge Medication List as of 9/19/2022  4:44 PM      START taking these medications    Details   cyclobenzaprine (FLEXERIL) 10 MG tablet Take 1 tablet (10 mg) by mouth 3 times daily as needed for muscle spasms, Disp-20 tablet, R-0, E-Prescribe      methylPREDNISolone (MEDROL DOSEPAK) 4 MG tablet therapy pack Follow Package Directions, Disp-21 tablet, R-0, E-Prescribe      oxyCODONE (ROXICODONE) 5 MG tablet Take 1 tablet (5 mg) by mouth every 6 hours as needed for moderate to severe pain, Disp-10 tablet, R-0, E-Prescribe             Final diagnoses:   Lumbar radiculopathy       9/19/2022   [facility] EMERGENCY DEPT     Panfilo, DARYL Henry CNP  09/20/22 0004

## 2022-09-19 NOTE — DISCHARGE INSTRUCTIONS
Heat alternating with ice packs.  Medrol Dosepak (Steroid, per package instructions)  Flexeril 10 mg three times a day as needed (muscle relaxer).  Tylenol 650 mg every 4-6 hours as needed for pain.  Ibuprofen 400-600 mg every 6-8 hours as needed for pain  (take with food, stop if causing stomach pains.)  Oxycodone 5 mg every 6 hours as needed for moderate/severe pain.   --This is short term use, use sparingly, can be addictive.   --Use stool softeners to avoid constipation.   --Do not drive or drink alcohol on this medication.  Outpatient MRI ordered. Call [phone] to schedule.  Make appointment to see your regular doctor for recheck and to get results of your MRI.  I placed referral for Spine/Orthopedics.  Call [phone] to schedule appointment.    Return for fevers, loss of bowel or bladder control, or any worsening symptoms/pain.

## 2022-09-22 ENCOUNTER — HOSPITAL ENCOUNTER (OUTPATIENT)
Dept: MRI IMAGING | Facility: CLINIC | Age: 52
Discharge: HOME | End: 2022-09-22
Attending: NURSE PRACTITIONER | Admitting: NURSE PRACTITIONER
Payer: COMMERCIAL

## 2022-09-22 DIAGNOSIS — M54.16 LUMBAR RADICULOPATHY: ICD-10-CM

## 2022-09-22 PROCEDURE — 72148 MRI LUMBAR SPINE W/O DYE: CPT

## 2022-10-03 ENCOUNTER — APPOINTMENT (OUTPATIENT)
Dept: MRI IMAGING | Facility: CLINIC | Age: 52
End: 2022-10-03
Attending: EMERGENCY MEDICINE
Payer: COMMERCIAL

## 2022-10-03 ENCOUNTER — APPOINTMENT (OUTPATIENT)
Dept: CT IMAGING | Facility: CLINIC | Age: 52
End: 2022-10-03
Attending: EMERGENCY MEDICINE
Payer: COMMERCIAL

## 2022-10-03 ENCOUNTER — HOSPITAL ENCOUNTER (EMERGENCY)
Facility: CLINIC | Age: 52
Discharge: ANOTHER HOSPITAL | End: 2022-10-04
Attending: EMERGENCY MEDICINE | Admitting: EMERGENCY MEDICINE
Payer: COMMERCIAL

## 2022-10-03 DIAGNOSIS — M54.16 LUMBAR RADICULOPATHY: ICD-10-CM

## 2022-10-03 DIAGNOSIS — R29.898 WEAKNESS OF LEFT LOWER EXTREMITY: ICD-10-CM

## 2022-10-03 LAB
ALBUMIN SERPL-MCNC: 3.5 G/DL (ref 3.4–5)
ALP SERPL-CCNC: 61 U/L (ref 40–150)
ALT SERPL W P-5'-P-CCNC: 41 U/L (ref 0–50)
ANION GAP SERPL CALCULATED.3IONS-SCNC: 4 MMOL/L (ref 3–14)
AST SERPL W P-5'-P-CCNC: 27 U/L (ref 0–45)
BASOPHILS # BLD AUTO: 0.1 10E3/UL (ref 0–0.2)
BASOPHILS NFR BLD AUTO: 1 %
BILIRUB SERPL-MCNC: 0.3 MG/DL (ref 0.2–1.3)
BUN SERPL-MCNC: 22 MG/DL (ref 7–30)
CALCIUM SERPL-MCNC: 8.4 MG/DL (ref 8.5–10.1)
CHLORIDE BLD-SCNC: 109 MMOL/L (ref 94–109)
CO2 SERPL-SCNC: 25 MMOL/L (ref 20–32)
CREAT SERPL-MCNC: 0.69 MG/DL (ref 0.52–1.04)
EOSINOPHIL # BLD AUTO: 0.1 10E3/UL (ref 0–0.7)
EOSINOPHIL NFR BLD AUTO: 2 %
ERYTHROCYTE [DISTWIDTH] IN BLOOD BY AUTOMATED COUNT: 11.3 % (ref 10–15)
GFR SERPL CREATININE-BSD FRML MDRD: >90 ML/MIN/1.73M2
GLUCOSE BLD-MCNC: 90 MG/DL (ref 70–99)
HCT VFR BLD AUTO: 38.4 % (ref 35–47)
HGB BLD-MCNC: 13.1 G/DL (ref 11.7–15.7)
IMM GRANULOCYTES # BLD: 0 10E3/UL
IMM GRANULOCYTES NFR BLD: 0 %
LYMPHOCYTES # BLD AUTO: 1 10E3/UL (ref 0.8–5.3)
LYMPHOCYTES NFR BLD AUTO: 14 %
MCH RBC QN AUTO: 32.8 PG (ref 26.5–33)
MCHC RBC AUTO-ENTMCNC: 34.1 G/DL (ref 31.5–36.5)
MCV RBC AUTO: 96 FL (ref 78–100)
MONOCYTES # BLD AUTO: 0.6 10E3/UL (ref 0–1.3)
MONOCYTES NFR BLD AUTO: 9 %
NEUTROPHILS # BLD AUTO: 5.5 10E3/UL (ref 1.6–8.3)
NEUTROPHILS NFR BLD AUTO: 74 %
NRBC # BLD AUTO: 0 10E3/UL
NRBC BLD AUTO-RTO: 0 /100
PLATELET # BLD AUTO: 217 10E3/UL (ref 150–450)
POTASSIUM BLD-SCNC: 4.4 MMOL/L (ref 3.4–5.3)
PROT SERPL-MCNC: 6.5 G/DL (ref 6.8–8.8)
RBC # BLD AUTO: 3.99 10E6/UL (ref 3.8–5.2)
SARS-COV-2 RNA RESP QL NAA+PROBE: NEGATIVE
SODIUM SERPL-SCNC: 138 MMOL/L (ref 133–144)
WBC # BLD AUTO: 7.3 10E3/UL (ref 4–11)

## 2022-10-03 PROCEDURE — 250N000011 HC RX IP 250 OP 636: Performed by: EMERGENCY MEDICINE

## 2022-10-03 PROCEDURE — A9585 GADOBUTROL INJECTION: HCPCS | Performed by: EMERGENCY MEDICINE

## 2022-10-03 PROCEDURE — 70496 CT ANGIOGRAPHY HEAD: CPT

## 2022-10-03 PROCEDURE — 96374 THER/PROPH/DIAG INJ IV PUSH: CPT | Mod: 59 | Performed by: EMERGENCY MEDICINE

## 2022-10-03 PROCEDURE — 82040 ASSAY OF SERUM ALBUMIN: CPT | Performed by: EMERGENCY MEDICINE

## 2022-10-03 PROCEDURE — C9803 HOPD COVID-19 SPEC COLLECT: HCPCS | Performed by: EMERGENCY MEDICINE

## 2022-10-03 PROCEDURE — 96375 TX/PRO/DX INJ NEW DRUG ADDON: CPT | Performed by: EMERGENCY MEDICINE

## 2022-10-03 PROCEDURE — 255N000002 HC RX 255 OP 636: Performed by: EMERGENCY MEDICINE

## 2022-10-03 PROCEDURE — 70498 CT ANGIOGRAPHY NECK: CPT

## 2022-10-03 PROCEDURE — 85025 COMPLETE CBC W/AUTO DIFF WBC: CPT | Performed by: EMERGENCY MEDICINE

## 2022-10-03 PROCEDURE — 250N000009 HC RX 250: Performed by: EMERGENCY MEDICINE

## 2022-10-03 PROCEDURE — 70450 CT HEAD/BRAIN W/O DYE: CPT

## 2022-10-03 PROCEDURE — 99284 EMERGENCY DEPT VISIT MOD MDM: CPT | Performed by: EMERGENCY MEDICINE

## 2022-10-03 PROCEDURE — 36415 COLL VENOUS BLD VENIPUNCTURE: CPT | Performed by: EMERGENCY MEDICINE

## 2022-10-03 PROCEDURE — 80053 COMPREHEN METABOLIC PANEL: CPT | Performed by: EMERGENCY MEDICINE

## 2022-10-03 PROCEDURE — 96376 TX/PRO/DX INJ SAME DRUG ADON: CPT | Performed by: EMERGENCY MEDICINE

## 2022-10-03 PROCEDURE — 72158 MRI LUMBAR SPINE W/O & W/DYE: CPT

## 2022-10-03 PROCEDURE — 87635 SARS-COV-2 COVID-19 AMP PRB: CPT | Performed by: EMERGENCY MEDICINE

## 2022-10-03 PROCEDURE — 99285 EMERGENCY DEPT VISIT HI MDM: CPT | Mod: 25 | Performed by: EMERGENCY MEDICINE

## 2022-10-03 PROCEDURE — 96361 HYDRATE IV INFUSION ADD-ON: CPT | Performed by: EMERGENCY MEDICINE

## 2022-10-03 RX ORDER — FENTANYL CITRATE 50 UG/ML
100 INJECTION, SOLUTION INTRAMUSCULAR; INTRAVENOUS ONCE
Status: COMPLETED | OUTPATIENT
Start: 2022-10-03 | End: 2022-10-03

## 2022-10-03 RX ORDER — LORAZEPAM 2 MG/ML
1 INJECTION INTRAMUSCULAR ONCE
Status: COMPLETED | OUTPATIENT
Start: 2022-10-03 | End: 2022-10-03

## 2022-10-03 RX ORDER — GADOBUTROL 604.72 MG/ML
7.5 INJECTION INTRAVENOUS ONCE
Status: COMPLETED | OUTPATIENT
Start: 2022-10-03 | End: 2022-10-03

## 2022-10-03 RX ORDER — GABAPENTIN 300 MG/1
300 CAPSULE ORAL 3 TIMES DAILY
Status: ON HOLD | COMMUNITY
Start: 2022-09-27 | End: 2022-10-25

## 2022-10-03 RX ORDER — HYDROMORPHONE HYDROCHLORIDE 1 MG/ML
0.5 INJECTION, SOLUTION INTRAMUSCULAR; INTRAVENOUS; SUBCUTANEOUS ONCE
Status: COMPLETED | OUTPATIENT
Start: 2022-10-03 | End: 2022-10-03

## 2022-10-03 RX ORDER — OLOPATADINE HYDROCHLORIDE 1 MG/ML
1 SOLUTION/ DROPS OPHTHALMIC DAILY PRN
COMMUNITY
End: 2024-04-22

## 2022-10-03 RX ORDER — DICLOFENAC SODIUM 75 MG/1
75 TABLET, DELAYED RELEASE ORAL 2 TIMES DAILY
COMMUNITY
Start: 2022-09-21 | End: 2022-10-23

## 2022-10-03 RX ORDER — HYDROCODONE BITARTRATE AND ACETAMINOPHEN 5; 325 MG/1; MG/1
1 TABLET ORAL EVERY 4 HOURS PRN
Status: ON HOLD | COMMUNITY
Start: 2022-09-28 | End: 2022-10-06

## 2022-10-03 RX ORDER — LORATADINE 10 MG/1
10 TABLET ORAL DAILY
COMMUNITY

## 2022-10-03 RX ORDER — IOPAMIDOL 755 MG/ML
500 INJECTION, SOLUTION INTRAVASCULAR ONCE
Status: COMPLETED | OUTPATIENT
Start: 2022-10-03 | End: 2022-10-03

## 2022-10-03 RX ORDER — DEXAMETHASONE SODIUM PHOSPHATE 10 MG/ML
10 INJECTION, SOLUTION INTRAMUSCULAR; INTRAVENOUS ONCE
Status: COMPLETED | OUTPATIENT
Start: 2022-10-03 | End: 2022-10-03

## 2022-10-03 RX ORDER — NAPROXEN 250 MG/1
250 TABLET ORAL 2 TIMES DAILY PRN
COMMUNITY
End: 2022-10-04

## 2022-10-03 RX ORDER — GADOBUTROL 604.72 MG/ML
10 INJECTION INTRAVENOUS ONCE
Status: DISCONTINUED | OUTPATIENT
Start: 2022-10-03 | End: 2022-10-03

## 2022-10-03 RX ORDER — HYDROMORPHONE HYDROCHLORIDE 1 MG/ML
0.5 INJECTION, SOLUTION INTRAMUSCULAR; INTRAVENOUS; SUBCUTANEOUS
Status: DISCONTINUED | OUTPATIENT
Start: 2022-10-03 | End: 2022-10-04 | Stop reason: HOSPADM

## 2022-10-03 RX ORDER — KETOROLAC TROMETHAMINE 15 MG/ML
15 INJECTION, SOLUTION INTRAMUSCULAR; INTRAVENOUS ONCE
Status: COMPLETED | OUTPATIENT
Start: 2022-10-03 | End: 2022-10-04

## 2022-10-03 RX ORDER — GADOBUTROL 604.72 MG/ML
7.5 INJECTION INTRAVENOUS ONCE
Status: DISCONTINUED | OUTPATIENT
Start: 2022-10-03 | End: 2022-10-03

## 2022-10-03 RX ORDER — TRAZODONE HYDROCHLORIDE 50 MG/1
50 TABLET, FILM COATED ORAL ONCE
Status: COMPLETED | OUTPATIENT
Start: 2022-10-03 | End: 2022-10-04

## 2022-10-03 RX ADMIN — LORAZEPAM 1 MG: 2 INJECTION INTRAMUSCULAR; INTRAVENOUS at 19:50

## 2022-10-03 RX ADMIN — FENTANYL CITRATE 100 MCG: 50 INJECTION INTRAMUSCULAR; INTRAVENOUS at 18:23

## 2022-10-03 RX ADMIN — FENTANYL CITRATE 100 MCG: 50 INJECTION INTRAMUSCULAR; INTRAVENOUS at 21:57

## 2022-10-03 RX ADMIN — GADOBUTROL 7.5 ML: 604.72 INJECTION INTRAVENOUS at 20:57

## 2022-10-03 RX ADMIN — DEXAMETHASONE SODIUM PHOSPHATE 10 MG: 10 INJECTION, SOLUTION INTRAMUSCULAR; INTRAVENOUS at 23:05

## 2022-10-03 RX ADMIN — SODIUM CHLORIDE 100 ML: 9 INJECTION, SOLUTION INTRAVENOUS at 22:35

## 2022-10-03 RX ADMIN — FENTANYL CITRATE 100 MCG: 50 INJECTION, SOLUTION INTRAMUSCULAR; INTRAVENOUS at 16:42

## 2022-10-03 RX ADMIN — IOPAMIDOL 80 ML: 755 INJECTION, SOLUTION INTRAVENOUS at 22:35

## 2022-10-03 RX ADMIN — HYDROMORPHONE HYDROCHLORIDE 0.5 MG: 1 INJECTION, SOLUTION INTRAMUSCULAR; INTRAVENOUS; SUBCUTANEOUS at 23:10

## 2022-10-03 ASSESSMENT — ENCOUNTER SYMPTOMS
BACK PAIN: 1
FATIGUE: 0
FEVER: 0
CHILLS: 0
NECK PAIN: 0
MYALGIAS: 0
WOUND: 0
ARTHRALGIAS: 0
WEAKNESS: 1
NUMBNESS: 1

## 2022-10-03 ASSESSMENT — ACTIVITIES OF DAILY LIVING (ADL)
ADLS_ACUITY_SCORE: 39

## 2022-10-03 NOTE — ED NOTES
Bed: ED16  Expected date: 10/3/22  Expected time: 4:10 PM  Means of arrival:   Comments:  [facility]  back pain, left leg numbness

## 2022-10-03 NOTE — ED TRIAGE NOTES
Pt c/o left sided lower back pain x 2 wks.  Recent Herniation of L3/L4.  Steroid injection last week, not helping.   EMS gave 100 mcg of Intranasal Fentanyl and 75 of IV Fentanyl.      Triage Assessment     Row Name 10/03/22 [id number]       Triage Assessment (Adult)    Airway WDL WDL       Respiratory WDL    Respiratory WDL WDL       Cardiac WDL    Cardiac WDL WDL

## 2022-10-03 NOTE — ED PROVIDER NOTES
History     Chief Complaint   Patient presents with     Back Pain     HPI  Jennifer Reyes is a 52 year old female who who has a history of known lower lumbar spinal surgery arriving today to the emergency room due to concern of increasing lumbar back pain now with weakness.  The patient read 16 days ago she developed abrupt onset of lower lumbar spine spinal pain.  The following day she was seen in the emergency department diagnosed with an L3-L4 lumbar spinal hernia.  She was seen by pain management team and had a local injection.  She reports over the last 3 days she has now developed increasing left lower extremity weakness.  She reports she feels that she is developing some foot drop which is new.  She reports pain is ongoing despite over-the-counter pain medication and her injection.  Pain currently rated at severe in intensity exacerbated by certain positioning.  She also reports development of numbness over the lateral aspect of her left thigh over her knee proceeding medially.  She feels a tingling sensation.  No loss of bowel or bladder control.  She is not anticoagulated.  She reports no associated fever.    Allergies:  Allergies   Allergen Reactions     Banana Hives     Same with kiwi     Cats      Morphine Hives     Peanuts [Nuts]      Patient states she is allergic to all tree nuts.     Rocephin [Ceftriaxone]        Problem List:    Patient Active Problem List    Diagnosis Date Noted     Persistent insomnia 08/05/2022     Priority: Medium     Seasonal allergic rhinitis due to pollen 08/05/2022     Priority: Medium     Thrombocytopenia (H) 08/05/2022     Priority: Medium     Rhinoconjunctivitis 07/06/2021     Priority: Medium     Allergic reaction to tree nut 07/06/2021     Priority: Medium     PFAS (pollen-food allergy syndrome), subsequent encounter 07/06/2021     Priority: Medium     Chemical dependency (H) 04/06/2015     Priority: Medium     Hx of opiate addiction, treatment 2015       Chronic  low back pain 10/24/2014     Priority: Medium     Nayan UREÑA at [facility].        CARDIOVASCULAR SCREENING; LDL GOAL LESS THAN 100 10/31/2010     Priority: Medium     Idiopathic cardiomyopathy (H) 07/20/2010     Priority: Medium     Major depressive disorder, single episode, severe without psychotic features (H) 11/20/2006     Priority: Medium     Stable on Effexor and Trazodone  Problem list name updated by automated process. 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     Lumbago 02/20/10     Myocardial infarction (H) 2010     Other pulmonary insufficiency, not elsewhere classified      Poisoning and toxic reactions caused by other plants      Pulmonary insufficiency following trauma and surgery 05/28/06     Syncope 7/7/10     Syncope and collapse        Past Surgical History:    Past Surgical History:   Procedure Laterality Date     COLONOSCOPY  10/2020    Large sessile serrated adenoma, repeat 3 years     HC DEBRIDE SKIN/SUBQ TISSUE  11/16/2009    Post-op wound     HC INJ EPIDURAL LUMBAR/SACRAL W/WO CONTRAST  2009    Left L4-5     HC LAP,FULGURATE/EXCISE LESIONS  02/27/2007    Dx lap, fulguration of endometriosis.  [facility]     HC REMOVAL OF TONSILS,<13 Y/O  Age 7 or 8     HC TOOTH EXTRACTION W/FORCEP  Age 17    [place] teeth extraction x4.     HYSTERECTOMY      approx 2008 - ovaries still in, unsure about cervix. no cancer. had fibroids.     HYSTERECTOMY, PAP NO LONGER  INDICATED       LAMINECT/DISCECTOMY, LUMBAR  10/24/2009     TUBAL LIGATION  02/27/2007    [facility]    L4 and L5 to S1     [facility] UGI ENDOSCOPY, SIMPLE EXAM  11/08/2008       Family History:    Family History   Problem Relation Age of Onset     Anxiety Disorder Father      Heart Failure Father      Depression Father      Alcohol/Drug Father      Anxiety Disorder Brother      Bipolar Disorder Sister      Suicide Paternal Uncle      Heart Failure Mother      Breast Cancer Maternal Aunt      Cancer Maternal Grandfather         stomach     Cancer Paternal Grandmother         stomach     Depression Brother        Social History:  Marital Status:   [2]  Social History     Tobacco Use     Smoking status: Never Smoker     Smokeless tobacco: Never Used   Substance Use Topics     Alcohol use: Yes     Comment: socially     Drug use: No        Medications:    azelastine (ASTELIN) 0.1 % nasal spray  diclofenac (VOLTAREN) 75 MG EC tablet  gabapentin (NEURONTIN) 300 MG capsule  HYDROcodone-acetaminophen (NORCO) 5-325 MG tablet  loratadine (CLARITIN) 10 MG tablet  naproxen (NAPROSYN) 250 MG tablet  olopatadine (PATANOL) 0.1 % ophthalmic solution  traZODone (DESYREL) 100 MG tablet  venlafaxine (EFFEXOR XR) 150 MG 24 hr capsule  vitamin D3 (CHOLECALCIFEROL) 50 mcg (2000 units) tablet  EPINEPHrine (ANY BX GENERIC EQUIV) 0.3 MG/0.3ML injection 2-pack  fluticasone (FLONASE) 50 MCG/ACT nasal spray  methylPREDNISolone (MEDROL DOSEPAK) 4 MG tablet therapy pack  montelukast (SINGULAIR) 10 MG tablet          Review of Systems   Constitutional: Negative for chills, fatigue and fever.   Musculoskeletal: Positive for back pain. Negative for arthralgias, myalgias and neck pain.   Skin: Negative for wound.   Neurological: Positive for weakness and numbness.       Physical Exam   BP: 134/80  Pulse: 84  Temp: 97.6  F (36.4  C)  Resp: 18  Weight: 70.3 kg (155 lb)  SpO2: 95 %      Physical  Exam  Vitals and nursing note reviewed.   Constitutional:       General: She is in acute distress.      Appearance: She is not ill-appearing, toxic-appearing or diaphoretic.   HENT:      Head: Normocephalic and atraumatic.   Cardiovascular:      Pulses: Normal pulses.   Skin:     General: Skin is warm.      Capillary Refill: Capillary refill takes less than 2 seconds.   Neurological:      General: No focal deficit present.      Mental Status: She is alert.      GCS: GCS eye subscore is 4. GCS verbal subscore is 5. GCS motor subscore is 6.      Cranial Nerves: No cranial nerve deficit.      Comments: 4 5 strength with flexion at the left hip.  3 of 5 strength with dorsi flexion and plantar flexion left ankle.  Downgoing Babinski.  Slight diminished in sensation in the region of L3-L4 on left.  Normal neurologic examination on right.         ED Course                 Procedures           Results for orders placed or performed during the hospital encounter of 10/03/22 (from the past 24 hour(s))   Comprehensive metabolic panel   Result Value Ref Range    Sodium 138 133 - 144 mmol/L    Potassium 4.4 3.4 - 5.3 mmol/L    Chloride 109 94 - 109 mmol/L    Carbon Dioxide (CO2) 25 20 - 32 mmol/L    Anion Gap 4 3 - 14 mmol/L    Urea Nitrogen 22 7 - 30 mg/dL    Creatinine 0.69 0.52 - 1.04 mg/dL    Calcium 8.4 (L) 8.5 - 10.1 mg/dL    Glucose 90 70 - 99 mg/dL    Alkaline Phosphatase 61 40 - 150 U/L    AST 27 0 - 45 U/L    ALT 41 0 - 50 U/L    Protein Total 6.5 (L) 6.8 - 8.8 g/dL    Albumin 3.5 3.4 - 5.0 g/dL    Bilirubin Total 0.3 0.2 - 1.3 mg/dL    GFR Estimate >90 >60 mL/min/1.73m2   CBC with platelets differential    Narrative    The following orders were created for panel order CBC with platelets differential.  Procedure                               Abnormality         Status                     ---------                               -----------         ------                     CBC with platelets and d...[[id number]]                       Final result                 Please view results for these tests on the individual orders.   CBC with platelets and differential   Result Value Ref Range    WBC Count 7.3 4.0 - 11.0 10e3/uL    RBC Count 3.99 3.80 - 5.20 10e6/uL    Hemoglobin 13.1 11.7 - 15.7 g/dL    Hematocrit 38.4 35.0 - 47.0 %    MCV 96 78 - 100 fL    MCH 32.8 26.5 - 33.0 pg    MCHC 34.1 31.5 - 36.5 g/dL    RDW 11.3 10.0 - 15.0 %    Platelet Count 217 150 - 450 10e3/uL    % Neutrophils 74 %    % Lymphocytes 14 %    % Monocytes 9 %    % Eosinophils 2 %    % Basophils 1 %    % Immature Granulocytes 0 %    NRBCs per 100 WBC 0 <1 /100    Absolute Neutrophils 5.5 1.6 - 8.3 10e3/uL    Absolute Lymphocytes 1.0 0.8 - 5.3 10e3/uL    Absolute Monocytes 0.6 0.0 - 1.3 10e3/uL    Absolute Eosinophils 0.1 0.0 - 0.7 10e3/uL    Absolute Basophils 0.1 0.0 - 0.2 10e3/uL    Absolute Immature Granulocytes 0.0 <=0.4 10e3/uL    Absolute NRBCs 0.0 10e3/uL   MR Lumbar Spine w/o & w Contrast    Narrative    EXAM: MR LUMBAR SPINE W/O and W CONTRAST  LOCATION: [facility]  DATE/TIME: 10/3/2022 9:18 PM    INDICATION: Back pain and incontinence.  COMPARISON: 09/22/2022.  CONTRAST: 7.5mL Gadavist  TECHNIQUE: Routine Lumbar Spine MRI without and with IV contrast.    FINDINGS:   Nomenclature is based on 5 lumbar type vertebral bodies. There is good anatomic alignment of the lumbar spine. The vertebral body heights are well-maintained throughout and have appropriate signal characteristics for the patient's age. The patient is   status post anterior spinal fusion with metallic hardware and interbody bone spacers at the L4-L5 and L5-S1 disc space levels. This is stable in the interval. Normal distal spinal cord and cauda equina with conus medullaris at T12. There is no abnormal   signal or change in size of the conus medullaris. There is no evidence of an intracanal mass or hemorrhage. Following the administration of contrast no abnormal  enhancement is noted involving the cauda equina.. There are postsurgical changes status post   a laminectomy at the L5-S1 disc space level. Unremarkable visualized bony pelvis.    T12-L1: Normal disc height and signal. No herniation. Normal facets. No spinal canal or neural foraminal stenosis.     L1-L2: Normal disc height and signal. No herniation. Normal facets. No spinal canal or neural foraminal stenosis.    L2-L3: Normal disc height and signal. No herniation. Normal facets. No spinal canal or neural foraminal stenosis.     L3-L4: There is a normal disc space height and signal. There is a rim-enhancing free fragment again noted in the left paracentral region inferiorly which may lead to displacement of the left L4 nerve  and left lateral recess narrowing. The neural foramen   are patent bilaterally. Stable in the interval.    L4-L5: Status post anterior spinal fusion. No evidence of canal compromise or neural foraminal narrowing.    L5-S1: Status post anterior spinal fusion and a posterior decompressive laminectomy. There is no evidence of canal compromise or neural foraminal narrowing.      Impression    IMPRESSION:  1.  Status post anterior spinal fusion at the L4-L5 and L5-S1 disc space levels with no evidence of canal compromise or neural foraminal narrowing.  2.  Stable rim enhancing probable free fragment in left paracentral region inferior to the L3-L4 disc space most likely leading to displacement of exiting left L4 nerve root and left lateral recess narrowing.   CT Head w/o Contrast    Narrative    EXAM: CTA HEAD NECK W CONTRAST, CT HEAD W/O CONTRAST  LOCATION: [facility]  DATE/TIME: 10/3/2022 10:56 PM    INDICATION: LLE weakness  COMPARISON: None.  CONTRAST: 80mL Isovue 370  TECHNIQUE: Head and neck CT angiogram with IV contrast. Noncontrast head CT followed by axial helical CT images of the head and neck vessels obtained during the arterial phase of intravenous contrast  administration. Axial 2D reconstructed images and   multiplanar 3D MIP reconstructed images of the head and neck vessels were performed by the technologist. Dose reduction techniques were used. All stenosis measurements made according to NASCET criteria unless otherwise specified.    FINDINGS:   NONCONTRAST HEAD CT:   INTRACRANIAL CONTENTS: No intracranial hemorrhage, extraaxial collection, or mass effect.  No CT evidence of acute infarct. Normal parenchymal attenuation. Normal ventricles and sulci. Empty sella morphology.    VISUALIZED ORBITS/SINUSES/MASTOIDS: No intraorbital abnormality. No paranasal sinus mucosal disease. No middle ear or mastoid effusion.    BONES/SOFT TISSUES: No acute abnormality.    HEAD CTA:  ANTERIOR CIRCULATION: No stenosis/occlusion, aneurysm, or high flow vascular malformation. Standard [place] anatomy.    POSTERIOR CIRCULATION: No stenosis/occlusion, aneurysm, or high flow vascular malformation. Dominant right and smaller left vertebral artery contribute to a normal basilar artery.     DURAL VENOUS SINUSES: Expected enhancement of the major dural venous sinuses.    NECK CTA:  RIGHT CAROTID: No measurable stenosis or dissection.    LEFT CAROTID: No measurable stenosis or dissection.    VERTEBRAL ARTERIES: No focal stenosis or dissection. Dominant right and smaller left vertebral arteries.    AORTIC ARCH: Classic aortic arch anatomy with no significant stenosis at the origin of the great vessels.    NONVASCULAR STRUCTURES: Unremarkable.      Impression    IMPRESSION:   HEAD CT:  1.  No acute intracranial process.    HEAD CTA:   1.  Normal CTA [place].    NECK CTA:  1.  Normal neck CTA.   CTA Head Neck with Contrast    Narrative    EXAM: CTA HEAD NECK W CONTRAST, CT HEAD W/O CONTRAST  LOCATION: [facility]  DATE/TIME: 10/3/2022 10:56 PM    INDICATION: LLE weakness  COMPARISON: None.  CONTRAST: 80mL Isovue 370  TECHNIQUE: Head and neck CT  angiogram with IV contrast. Noncontrast head CT followed by axial helical CT images of the head and neck vessels obtained during the arterial phase of intravenous contrast administration. Axial 2D reconstructed images and   multiplanar 3D MIP reconstructed images of the head and neck vessels were performed by the technologist. Dose reduction techniques were used. All stenosis measurements made according to NASCET criteria unless otherwise specified.    FINDINGS:   NONCONTRAST HEAD CT:   INTRACRANIAL CONTENTS: No intracranial hemorrhage, extraaxial collection, or mass effect.  No CT evidence of acute infarct. Normal parenchymal attenuation. Normal ventricles and sulci. Empty sella morphology.    VISUALIZED ORBITS/SINUSES/MASTOIDS: No intraorbital abnormality. No paranasal sinus mucosal disease. No middle ear or mastoid effusion.    BONES/SOFT TISSUES: No acute abnormality.    HEAD CTA:  ANTERIOR CIRCULATION: No stenosis/occlusion, aneurysm, or high flow vascular malformation. Standard [place] anatomy.    POSTERIOR CIRCULATION: No stenosis/occlusion, aneurysm, or high flow vascular malformation. Dominant right and smaller left vertebral artery contribute to a normal basilar artery.     DURAL VENOUS SINUSES: Expected enhancement of the major dural venous sinuses.    NECK CTA:  RIGHT CAROTID: No measurable stenosis or dissection.    LEFT CAROTID: No measurable stenosis or dissection.    VERTEBRAL ARTERIES: No focal stenosis or dissection. Dominant right and smaller left vertebral arteries.    AORTIC ARCH: Classic aortic arch anatomy with no significant stenosis at the origin of the great vessels.    NONVASCULAR STRUCTURES: Unremarkable.      Impression    IMPRESSION:   HEAD CT:  1.  No acute intracranial process.    HEAD CTA:   1.  Normal CTA [place].    NECK CTA:  1.  Normal neck CTA.   Asymptomatic COVID-19 Virus (Coronavirus) by PCR Nose    Specimen: Nose; Swab   Result Value Ref Range    SARS CoV2  PCR Negative Negative    Narrative    Testing was performed using the sarah  SARS-CoV-2 & Influenza A/B Assay on the sarah  Lucia  System.  This test should be ordered for the detection of SARS-COV-2 in individuals who meet SARS-CoV-2 clinical and/or epidemiological criteria. Test performance is unknown in asymptomatic patients.  This test is for in vitro diagnostic use under the FDA EUA for laboratories certified under CLIA to perform moderate and/or high complexity testing. This test has not been FDA cleared or approved.  A negative test does not rule out the presence of PCR inhibitors in the specimen or target RNA in concentration below the limit of detection for the assay. The possibility of a false negative should be considered if the patient's recent exposure or clinical presentation suggests COVID-19.  [facility] are certified under the Clinical Laboratory Improvement Amendments of 1988 (CLIA-88) as qualified to perform moderate and/or high complexity laboratory testing.       Medications   HYDROmorphone (PF) (DILAUDID) injection 0.5 mg (has no administration in time range)   fentaNYL (PF) (SUBLIMAZE) injection 100 mcg (100 mcg Intravenous Given 10/3/22 1642)   fentaNYL (PF) (SUBLIMAZE) injection 100 mcg (100 mcg Intravenous Given 10/3/22 1823)   LORazepam (ATIVAN) injection 1 mg (1 mg Intravenous Given 10/3/22 1950)   gadobutrol (GADAVIST) injection 7.5 mL (7.5 mLs Intravenous Given 10/3/22 2057)   fentaNYL (PF) (SUBLIMAZE) injection 100 mcg (100 mcg Intravenous Given 10/3/22 2157)   iopamidol (ISOVUE-370) solution 500 mL (80 mLs Intravenous Given 10/3/22 2235)   new 100 ml saline bag (100 mLs Intravenous Given 10/3/22 2235)   dexamethasone PF (DECADRON) injection 10 mg (10 mg Intravenous Given 10/3/22 2305)   HYDROmorphone (PF) (DILAUDID) injection 0.5 mg (0.5 mg Intravenous Given 10/3/22 2310)   ketorolac (TORADOL) injection 15 mg (15 mg Intravenous Given 10/4/22 0024)   traZODone  (DESYREL) tablet 50 mg (50 mg Oral Given 10/4/22 0026)       Assessments & Plan (with Medical Decision Making)     I have reviewed the nursing notes.    I have reviewed the findings, diagnosis, plan and need for follow up with the patient.  Jennifer Reyes is a 52 year old female who who has a history of known lower lumbar spinal surgery arriving today to the emergency room due to concern of increasing lumbar back pain now with weakness.  Upon arrival the patient is noted be alert.  He is presently afebrile and hemodynamically stable.  She is lying supine in bed and appears quite uncomfortable but is nontoxic.  External evaluation demonstrated no erythema or evidence of recent trauma.  Certainly of concern is her story of progressive left lower extremity weakness.  On my evaluation she does not fact have objective left lower extremity weakness primarily with dorsiflexion and plantarflexion at the ankle as well as some with flexion at the hip.  She reports diminished soft touch with sensory examination in the region of L3-L4.  I do believe the patient requires repeat MRI imaging and discussion with neurosurgery.    MRI demonstrates no significant progression over the past 2 weeks.  Again as of concern patient to have some objective weakness in the left lower extremity.  Certainly we did consider other potential causes such as stroke, demyelinating condition such as MS or Guillain-Barré.  My clinical suspicion for these is very low however.  I did discuss with neurosurgery who also feel alternative explanation would be low but I reassured by MRI demonstrate no sign of significant physical impingement on the cord.  Options offered per neurosurgical recommendation for outpatient follow-up in the morning at 10 AM in [facility] versus acute hospitalization for clinical monitoring and neurology/neurosurgical evaluation.  Unfortunate this time the patient continues to have severe lumbar back pain now  despite 6 rounds of IV and intranasal narcotics.  Patient did receive IV steroids as well.    On reevaluation the patient continues to have severe lumbar back pain.  I am not optimistic she will do well on oral medications with a 2-hour drive.  I suspect she will show up to neurosurgery clinic with severe ongoing pain.  I did discuss limitation however that there are no current beds in the [facility] system.  Tentative anticipation for opening of bed in a.m. at [facility] where on-call neurosurgical team is aware of patient.  Patient will remain in the emergency department overnight with plan for IV pain control.  Should she have resolution of pain she may return to a.m. clinic appointment however low suspicion for this.  Plan will be for hospital admission in a.m. pending bed availability with neurosurgical consultation.  Patient signed over to my colleague at midnight pending above plan.      New Prescriptions    No medications on file       Final diagnoses:   Lumbar radiculopathy   Weakness of left lower extremity       10/3/2022   [facility] EMERGENCY DEPT     Tor Butterfield MD  10/05/22 1003

## 2022-10-04 ENCOUNTER — HOSPITAL ENCOUNTER (OUTPATIENT)
Facility: CLINIC | Age: 52
Setting detail: OBSERVATION
Discharge: HOME | End: 2022-10-06
Attending: HOSPITALIST | Admitting: NEUROLOGICAL SURGERY
Payer: COMMERCIAL

## 2022-10-04 ENCOUNTER — PREP FOR PROCEDURE (OUTPATIENT)
Dept: NEUROLOGY | Facility: CLINIC | Age: 52
End: 2022-10-04

## 2022-10-04 VITALS
WEIGHT: 155 LBS | TEMPERATURE: 97.6 F | RESPIRATION RATE: 18 BRPM | SYSTOLIC BLOOD PRESSURE: 102 MMHG | DIASTOLIC BLOOD PRESSURE: 59 MMHG | OXYGEN SATURATION: 99 % | BODY MASS INDEX: 21.02 KG/M2 | HEART RATE: 84 BPM

## 2022-10-04 DIAGNOSIS — M51.16 HERNIATION OF LUMBAR INTERVERTEBRAL DISC WITH RADICULOPATHY: Primary | ICD-10-CM

## 2022-10-04 DIAGNOSIS — Z98.890 S/P LUMBAR MICRODISCECTOMY: Primary | ICD-10-CM

## 2022-10-04 PROBLEM — M51.06 LUMBAR DISC HERNIATION WITH MYELOPATHY: Status: ACTIVE | Noted: 2022-10-04

## 2022-10-04 PROCEDURE — 250N000011 HC RX IP 250 OP 636: Performed by: EMERGENCY MEDICINE

## 2022-10-04 PROCEDURE — 120N000001 HC R&B MED SURG/OB

## 2022-10-04 PROCEDURE — 250N000013 HC RX MED GY IP 250 OP 250 PS 637: Performed by: EMERGENCY MEDICINE

## 2022-10-04 PROCEDURE — 99205 OFFICE O/P NEW HI 60 MIN: CPT | Mod: FS | Performed by: NEUROLOGICAL SURGERY

## 2022-10-04 PROCEDURE — 96361 HYDRATE IV INFUSION ADD-ON: CPT

## 2022-10-04 PROCEDURE — 96375 TX/PRO/DX INJ NEW DRUG ADDON: CPT | Performed by: EMERGENCY MEDICINE

## 2022-10-04 PROCEDURE — 250N000011 HC RX IP 250 OP 636: Performed by: PHYSICIAN ASSISTANT

## 2022-10-04 PROCEDURE — 96374 THER/PROPH/DIAG INJ IV PUSH: CPT

## 2022-10-04 PROCEDURE — 250N000013 HC RX MED GY IP 250 OP 250 PS 637: Performed by: PHYSICIAN ASSISTANT

## 2022-10-04 PROCEDURE — 96375 TX/PRO/DX INJ NEW DRUG ADDON: CPT

## 2022-10-04 PROCEDURE — 99207 PR NO CHARGE LOS: CPT | Mod: 57 | Performed by: NEUROLOGICAL SURGERY

## 2022-10-04 PROCEDURE — 96376 TX/PRO/DX INJ SAME DRUG ADON: CPT | Performed by: EMERGENCY MEDICINE

## 2022-10-04 PROCEDURE — 258N000003 HC RX IP 258 OP 636: Performed by: PHYSICIAN ASSISTANT

## 2022-10-04 PROCEDURE — 96376 TX/PRO/DX INJ SAME DRUG ADON: CPT

## 2022-10-04 PROCEDURE — 99220 PR INITIAL OBSERVATION CARE,LEVEL III: CPT | Performed by: PHYSICIAN ASSISTANT

## 2022-10-04 RX ORDER — AMOXICILLIN 250 MG
1 CAPSULE ORAL 2 TIMES DAILY PRN
Status: DISCONTINUED | OUTPATIENT
Start: 2022-10-04 | End: 2022-10-06 | Stop reason: HOSPADM

## 2022-10-04 RX ORDER — ACETAMINOPHEN 500 MG
500 TABLET ORAL EVERY 6 HOURS PRN
COMMUNITY

## 2022-10-04 RX ORDER — GABAPENTIN 300 MG/1
300 CAPSULE ORAL 3 TIMES DAILY
Status: DISCONTINUED | OUTPATIENT
Start: 2022-10-04 | End: 2022-10-06 | Stop reason: HOSPADM

## 2022-10-04 RX ORDER — BISACODYL 10 MG
10 SUPPOSITORY, RECTAL RECTAL DAILY PRN
Status: DISCONTINUED | OUTPATIENT
Start: 2022-10-04 | End: 2022-10-05

## 2022-10-04 RX ORDER — POLYETHYLENE GLYCOL 3350 17 G/17G
17 POWDER, FOR SOLUTION ORAL DAILY PRN
Status: DISCONTINUED | OUTPATIENT
Start: 2022-10-04 | End: 2022-10-06 | Stop reason: HOSPADM

## 2022-10-04 RX ORDER — NALOXONE HYDROCHLORIDE 0.4 MG/ML
0.4 INJECTION, SOLUTION INTRAMUSCULAR; INTRAVENOUS; SUBCUTANEOUS
Status: DISCONTINUED | OUTPATIENT
Start: 2022-10-04 | End: 2022-10-06 | Stop reason: HOSPADM

## 2022-10-04 RX ORDER — KETOROLAC TROMETHAMINE 15 MG/ML
30 INJECTION, SOLUTION INTRAMUSCULAR; INTRAVENOUS EVERY 6 HOURS PRN
Status: DISCONTINUED | OUTPATIENT
Start: 2022-10-04 | End: 2022-10-06 | Stop reason: HOSPADM

## 2022-10-04 RX ORDER — LORATADINE 10 MG/1
10 TABLET ORAL DAILY
Status: DISCONTINUED | OUTPATIENT
Start: 2022-10-04 | End: 2022-10-06 | Stop reason: HOSPADM

## 2022-10-04 RX ORDER — LIDOCAINE 40 MG/G
CREAM TOPICAL
Status: DISCONTINUED | OUTPATIENT
Start: 2022-10-04 | End: 2022-10-06 | Stop reason: HOSPADM

## 2022-10-04 RX ORDER — NALOXONE HYDROCHLORIDE 0.4 MG/ML
0.2 INJECTION, SOLUTION INTRAMUSCULAR; INTRAVENOUS; SUBCUTANEOUS
Status: DISCONTINUED | OUTPATIENT
Start: 2022-10-04 | End: 2022-10-06 | Stop reason: HOSPADM

## 2022-10-04 RX ORDER — GABAPENTIN 300 MG/1
300 CAPSULE ORAL DAILY
Status: DISCONTINUED | OUTPATIENT
Start: 2022-10-04 | End: 2022-10-04

## 2022-10-04 RX ORDER — VENLAFAXINE HYDROCHLORIDE 75 MG/1
150 CAPSULE, EXTENDED RELEASE ORAL DAILY
Status: DISCONTINUED | OUTPATIENT
Start: 2022-10-05 | End: 2022-10-06 | Stop reason: HOSPADM

## 2022-10-04 RX ORDER — DEXAMETHASONE SODIUM PHOSPHATE 4 MG/ML
10 INJECTION, SOLUTION INTRA-ARTICULAR; INTRALESIONAL; INTRAMUSCULAR; INTRAVENOUS; SOFT TISSUE DAILY
Status: DISCONTINUED | OUTPATIENT
Start: 2022-10-04 | End: 2022-10-06 | Stop reason: HOSPADM

## 2022-10-04 RX ORDER — ONDANSETRON 4 MG/1
4 TABLET, ORALLY DISINTEGRATING ORAL EVERY 6 HOURS PRN
Status: DISCONTINUED | OUTPATIENT
Start: 2022-10-04 | End: 2022-10-06 | Stop reason: HOSPADM

## 2022-10-04 RX ORDER — LIDOCAINE 4 G/G
1 PATCH TOPICAL
Status: DISCONTINUED | OUTPATIENT
Start: 2022-10-04 | End: 2022-10-06 | Stop reason: HOSPADM

## 2022-10-04 RX ORDER — CYCLOBENZAPRINE HCL 10 MG
10 TABLET ORAL 3 TIMES DAILY
Status: DISCONTINUED | OUTPATIENT
Start: 2022-10-04 | End: 2022-10-06 | Stop reason: HOSPADM

## 2022-10-04 RX ORDER — TRAZODONE HYDROCHLORIDE 100 MG/1
100 TABLET ORAL
Status: DISCONTINUED | OUTPATIENT
Start: 2022-10-04 | End: 2022-10-04

## 2022-10-04 RX ORDER — AMOXICILLIN 250 MG
2 CAPSULE ORAL 2 TIMES DAILY PRN
Status: DISCONTINUED | OUTPATIENT
Start: 2022-10-04 | End: 2022-10-06 | Stop reason: HOSPADM

## 2022-10-04 RX ORDER — CYCLOBENZAPRINE HCL 10 MG
10 TABLET ORAL 3 TIMES DAILY PRN
COMMUNITY
End: 2022-10-23

## 2022-10-04 RX ORDER — FENTANYL 25 UG/1
25 PATCH TRANSDERMAL
Status: DISCONTINUED | OUTPATIENT
Start: 2022-10-04 | End: 2022-10-04 | Stop reason: HOSPADM

## 2022-10-04 RX ORDER — ONDANSETRON 2 MG/ML
4 INJECTION INTRAMUSCULAR; INTRAVENOUS EVERY 6 HOURS PRN
Status: DISCONTINUED | OUTPATIENT
Start: 2022-10-04 | End: 2022-10-06 | Stop reason: HOSPADM

## 2022-10-04 RX ORDER — NAPROXEN SODIUM 220 MG
220 TABLET ORAL 2 TIMES DAILY PRN
Status: ON HOLD | COMMUNITY
End: 2022-10-06

## 2022-10-04 RX ORDER — ACETAMINOPHEN 325 MG/1
975 TABLET ORAL EVERY 8 HOURS
Status: DISCONTINUED | OUTPATIENT
Start: 2022-10-04 | End: 2022-10-05

## 2022-10-04 RX ORDER — OXYCODONE HYDROCHLORIDE 5 MG/1
5 TABLET ORAL EVERY 4 HOURS PRN
Status: DISCONTINUED | OUTPATIENT
Start: 2022-10-04 | End: 2022-10-05

## 2022-10-04 RX ORDER — SODIUM CHLORIDE 9 MG/ML
INJECTION, SOLUTION INTRAVENOUS CONTINUOUS
Status: DISCONTINUED | OUTPATIENT
Start: 2022-10-04 | End: 2022-10-06

## 2022-10-04 RX ORDER — HYDROMORPHONE HYDROCHLORIDE 1 MG/ML
0.5 INJECTION, SOLUTION INTRAMUSCULAR; INTRAVENOUS; SUBCUTANEOUS
Status: DISCONTINUED | OUTPATIENT
Start: 2022-10-04 | End: 2022-10-04 | Stop reason: HOSPADM

## 2022-10-04 RX ORDER — HYDROMORPHONE HYDROCHLORIDE 1 MG/ML
0.5 INJECTION, SOLUTION INTRAMUSCULAR; INTRAVENOUS; SUBCUTANEOUS
Status: DISCONTINUED | OUTPATIENT
Start: 2022-10-04 | End: 2022-10-05

## 2022-10-04 RX ADMIN — ACETAMINOPHEN 975 MG: 325 TABLET, FILM COATED ORAL at 17:29

## 2022-10-04 RX ADMIN — HYDROMORPHONE HYDROCHLORIDE 0.5 MG: 1 INJECTION, SOLUTION INTRAMUSCULAR; INTRAVENOUS; SUBCUTANEOUS at 10:29

## 2022-10-04 RX ADMIN — LIDOCAINE 1 PATCH: 560 PATCH PERCUTANEOUS; TOPICAL; TRANSDERMAL at 19:10

## 2022-10-04 RX ADMIN — HYDROMORPHONE HYDROCHLORIDE 0.5 MG: 1 INJECTION, SOLUTION INTRAMUSCULAR; INTRAVENOUS; SUBCUTANEOUS at 07:25

## 2022-10-04 RX ADMIN — TRAZODONE HYDROCHLORIDE 50 MG: 50 TABLET ORAL at 00:26

## 2022-10-04 RX ADMIN — HYDROMORPHONE HYDROCHLORIDE 0.5 MG: 1 INJECTION, SOLUTION INTRAMUSCULAR; INTRAVENOUS; SUBCUTANEOUS at 02:35

## 2022-10-04 RX ADMIN — GABAPENTIN 300 MG: 300 CAPSULE ORAL at 17:30

## 2022-10-04 RX ADMIN — HYDROMORPHONE HYDROCHLORIDE 0.5 MG: 1 INJECTION, SOLUTION INTRAMUSCULAR; INTRAVENOUS; SUBCUTANEOUS at 12:51

## 2022-10-04 RX ADMIN — SODIUM CHLORIDE: 9 INJECTION, SOLUTION INTRAVENOUS at 17:38

## 2022-10-04 RX ADMIN — FENTANYL 1 PATCH: 25 PATCH, EXTENDED RELEASE TRANSDERMAL at 07:33

## 2022-10-04 RX ADMIN — CYCLOBENZAPRINE 10 MG: 10 TABLET, FILM COATED ORAL at 17:30

## 2022-10-04 RX ADMIN — KETOROLAC TROMETHAMINE 15 MG: 15 INJECTION, SOLUTION INTRAMUSCULAR; INTRAVENOUS at 00:24

## 2022-10-04 RX ADMIN — HYDROMORPHONE HYDROCHLORIDE 0.5 MG: 1 INJECTION, SOLUTION INTRAMUSCULAR; INTRAVENOUS; SUBCUTANEOUS at 17:32

## 2022-10-04 RX ADMIN — DEXAMETHASONE SODIUM PHOSPHATE 10 MG: 4 INJECTION, SOLUTION INTRAMUSCULAR; INTRAVENOUS at 17:30

## 2022-10-04 RX ADMIN — GABAPENTIN 300 MG: 300 CAPSULE ORAL at 22:50

## 2022-10-04 RX ADMIN — HYDROMORPHONE HYDROCHLORIDE 0.5 MG: 1 INJECTION, SOLUTION INTRAMUSCULAR; INTRAVENOUS; SUBCUTANEOUS at 20:41

## 2022-10-04 RX ADMIN — LORATADINE 10 MG: 10 TABLET ORAL at 17:30

## 2022-10-04 RX ADMIN — KETOROLAC TROMETHAMINE 30 MG: 15 INJECTION, SOLUTION INTRAMUSCULAR; INTRAVENOUS at 22:56

## 2022-10-04 RX ADMIN — HYDROMORPHONE HYDROCHLORIDE 0.5 MG: 1 INJECTION, SOLUTION INTRAMUSCULAR; INTRAVENOUS; SUBCUTANEOUS at 05:23

## 2022-10-04 RX ADMIN — CYCLOBENZAPRINE 10 MG: 10 TABLET, FILM COATED ORAL at 22:50

## 2022-10-04 ASSESSMENT — ACTIVITIES OF DAILY LIVING (ADL)
ADLS_ACUITY_SCORE: 39

## 2022-10-04 NOTE — ED PROVIDER NOTES
Patient signed out at shift change awaiting bed placement at [facility].  Ongoing issues with back pain with new myelopathy of her left leg.  May need surgical intervention.  Requiring repeated doses of IV narcotics for pain control.  At this point we will try a fentanyl patch to see if that will diminish the frequency of repeated dosing.  We will do cardiac monitoring.     Callum Salgado MD  10/04/22 0718

## 2022-10-04 NOTE — PROGRESS NOTES
Contacted regarding 53 yo female presenting to ER with left leg pain/weakness.   MRI with left L3-L4 HNP    MRI 10/3/22  IMPRESSION:  1.  Status post anterior spinal fusion at the L4-L5 and L5-S1 disc space levels with no evidence of canal compromise or neural foraminal narrowing.  2.  Stable rim enhancing probable free fragment in left paracentral region inferior to the L3-L4 disc space most likely leading to displacement of exiting left L4 nerve root and left lateral recess narrowing    MRI 9/22/22  IMPRESSION:  1. Postoperative changes of L4-L5 and L5-S1 interbody fusion and L5-S1  posterior decompression, new compared to the previous MRI.  2. New caudally migrating left lateral recess disc extrusion at L3-L4  exerts mass effect upon and posteriorly displaces the traversing left  L4 nerve root. Chronic clinically for possible left L4 radiculopathy.  3. No significant central spinal canal stenosis. No high-grade neural  foraminal stenosis.    RECOMMENDATIONS:  Decadron 10 mg IV now along with medrol dose pack for discharge.  Follow up in our spine clinic tomorrow for further evaluation.    Discussed with Dr. Solis.    Aaliyah Albarran, TAINAS  [facility]  [address]  [address] [place] [id number]    Tel [phone]  Pager [phone]

## 2022-10-04 NOTE — CONSULTS
[facility]    Neurosurgery Consultation     Date of Admission:  10/4/2022  Date of Consult (When I saw the patient): 10/04/22    Assessment & Plan   Jennifer Reyes is a 52 year old female who was admitted on 10/4/2022. I was asked to see the patient for left leg pain.  She initially injured her leg 18 days ago while helping her  lift something at home.  She noticed severe pain that radiate down the posterior lateral aspect of her left thigh and then moving anteriorly over the knee and into the shin.  She has also noticed some weakness in that left leg as well.  She ultimately did go to the ER on September 19, and was referred to [facility] spine for evaluation.  MRI showed left paracentral disc herniation at L3-4, contributing to lateral recess narrowing as well as some foraminal stenosis.  She was given a referral for physical therapy.  She also states that she received an injection of some kind, but there is no documentation so unclear if this was an IM injection for some pain medication or an epidural injection.  She states she did not notice any symptomatic improvement.  She then came to the ER in [place] yesterday, as the pain had escalated to a point where she was unable to get any relief whatsoever.  She denies any bowel or bladder changes, or any problems with balance or coordination, although she does note some weakness in the left lower extremity.    She has a history of an L4-S1 anterior lumbar interbody fusion with Dr. David Novoa in 2013.    As she does have some weakness with hip flexor as well as with dorsiflexion, we would recommend surgical intervention. Plan for OR tomorrow afternoon with Dr. Solis. NPO p MN.       I have discussed the following assessment and plan with Dr. Solis, who is in agreement with the initial plan and will follow up with further consultation recommendations.    [facility] Neurosurgery  [facility]  [address] [address]    Tel [phone]  Pager [phone]        Code Status    Full Code    Reason for Consult   Reason for consult: left leg pain    Primary Care Physician   Marni Montemayor    Chief Complaint   Left leg pain    History is obtained from the patient and electronic health record    History of Present Illness   Jennifer Reyes is a 52 year old female who presents with left leg pain.  She initially injured her leg 18 days ago while helping her  lift something at home.  She noticed severe pain that radiate down the posterior lateral aspect of her left thigh and then moving anteriorly over the knee and into the shin.  She has also noticed some weakness in that left leg as well.  She ultimately did go to the ER on September 19, and was referred to [facility] spine for evaluation.  MRI showed left paracentral disc herniation at L3-4, contributing to lateral recess narrowing as well as some foraminal stenosis.  She was given a referral for physical therapy.  She also states that she received an injection of some kind, but there is no documentation so unclear if this was an IM injection for some pain medication or an epidural injection.  She states she did not notice any symptomatic improvement.  She then came to the ER in [place] yesterday, as the pain had escalated to a point where she was unable to get any relief whatsoever.  She denies any bowel or bladder changes, or any problems with balance or coordination, although she does note some weakness in the left lower extremity.    Past Medical History   I have reviewed this patient's medical history and updated it with pertinent information if needed.   Past Medical History:   Diagnosis Date     Anemia, unspecified      Anxiety state, unspecified      Apnea      Contact dermatitis and other eczema due to drugs and medicines in contact with skin 05/16/2007    Allergic reaction to lavendar  aroma oil.     Contact dermatitis and other eczema due to plants (except food)      Depressive disorder, not elsewhere classified     had since 15, well controlled     Depressive disorder, not elsewhere classified 11/09/08    Suicidal ideation - [facility] admission     Displacement of lumbar intervertebral disc without myelopathy     L4-5     Duodenitis with hemorrhage 11/09/08     Erythema multiforme 07/22/2005    With secondary cellulitis, poison ivy rxn.  [facility] admit.     Lumbago 02/20/10    Transfer to [facility]     Myocardial infarction (H) 2010    idiopathic cardiomyopathy     Other pulmonary insufficiency, not elsewhere classified     Vocal cord dysfunction     Poisoning and toxic reactions caused by other plants      Pulmonary insufficiency following trauma and surgery 05/28/06    Admit.Discharged 06/01/06     Syncope 7/7/10    Elevated troponin, transfer to [facility]     Syncope and collapse     due to hypotension       Past Surgical History   I have reviewed this patient's surgical history and updated it with pertinent information if needed.  Past Surgical History:   Procedure Laterality Date     COLONOSCOPY  10/2020    Large sessile serrated adenoma, repeat 3 years     HC DEBRIDE SKIN/SUBQ TISSUE  11/16/2009    Post-op wound     HC INJ EPIDURAL LUMBAR/SACRAL W/WO CONTRAST  2009    Left L4-5     HC LAP,FULGURATE/EXCISE LESIONS  02/27/2007    Dx lap, fulguration of endometriosis.  [facility]     HC REMOVAL OF TONSILS,<11 Y/O  Age 7 or 8     HC TOOTH EXTRACTION W/FORCEP  Age 17    [place] teeth extraction x4.     HYSTERECTOMY      approx 2008 - ovaries still in, unsure about cervix. no cancer. had fibroids.     HYSTERECTOMY, PAP NO LONGER INDICATED       LAMINECT/DISCECTOMY, LUMBAR  10/24/2009     TUBAL LIGATION  02/27/2007    [facility] LUMBAR SPINE FUSION,ANTER APPRCH  2012    L4 and L5 to S1     [facility] UGI ENDOSCOPY, SIMPLE EXAM  11/08/2008       Prior to Admission Medications   Prior to  Admission Medications   Prescriptions Last Dose Informant Patient Reported? Taking?   EPINEPHrine (ANY BX GENERIC EQUIV) 0.3 MG/0.3ML injection 2-pack  Self No No   Sig: Inject 0.3 mLs (0.3 mg) into the muscle as needed for anaphylaxis   HYDROcodone-acetaminophen (NORCO) 5-325 MG tablet  Self Yes No   Sig: Take 1 tablet by mouth 4 times daily as needed for pain   azelastine (ASTELIN) 0.1 % nasal spray  Self No No   Sig: Spray 2 sprays into both nostrils 2 times daily   diclofenac (VOLTAREN) 75 MG EC tablet  Self Yes No   Sig: Take 75 mg by mouth daily   fluticasone (FLONASE) 50 MCG/ACT nasal spray   No No   Sig: Spray 2 sprays into both nostrils At Bedtime   Patient taking differently: Spray 2 sprays into both nostrils daily as needed   gabapentin (NEURONTIN) 300 MG capsule  Self Yes No   Sig: Take 300 mg by mouth daily   loratadine (CLARITIN) 10 MG tablet  Self Yes No   Sig: Take 10 mg by mouth daily   methylPREDNISolone (MEDROL DOSEPAK) 4 MG tablet therapy pack  Self No No   Sig: Follow Package Directions   montelukast (SINGULAIR) 10 MG tablet  Self No No   Sig: Take 1 tablet (10 mg) by mouth At Bedtime   Patient not taking: Reported on 10/3/2022   naproxen sodium (ANAPROX) 220 MG tablet   Yes No   Sig: Take 220 mg by mouth 2 times daily as needed for moderate pain   olopatadine (PATANOL) 0.1 % ophthalmic solution  Self Yes No   Sig: Apply 1 drop to eye 2 times daily   traZODone (DESYREL) 100 MG tablet  Self No No   Sig: TAKE 1/2 TO 1 TAB BY MOUTH NIGHTLY AS NEEDED FOR SLEEP   Patient taking differently: 100 mg TAKE 1/2 TO 1 TAB BY MOUTH NIGHTLY AS NEEDED FOR SLEEP   venlafaxine (EFFEXOR XR) 150 MG 24 hr capsule  Self No No   Sig: Take 1 capsule (150 mg) by mouth daily   vitamin D3 (CHOLECALCIFEROL) 50 mcg (2000 units) tablet  Self No No   Sig: Take 1 tablet (50 mcg) by mouth daily      Facility-Administered Medications: None     Allergies   Allergies   Allergen Reactions     Banana Hives     Same with kiwi      Cats      Morphine Hives     Peanuts [Nuts]      Patient states she is allergic to all tree nuts.     Rocephin [Ceftriaxone]        Social History   I have reviewed this patient's social history and updated it with pertinent information if needed. Jennifer Reyes  reports that she has never smoked. She has never used smokeless tobacco. She reports current alcohol use. She reports that she does not use drugs.    Family History   I have reviewed this patient's family history and updated it with pertinent information if needed.   Family History   Problem Relation Age of Onset     Anxiety Disorder Father      Heart Failure Father      Depression Father      Alcohol/Drug Father      Anxiety Disorder Brother      Bipolar Disorder Sister      Suicide Paternal Uncle      Heart Failure Mother      Breast Cancer Maternal Aunt      Cancer Maternal Grandfather         stomach     Cancer Paternal Grandmother         stomach     Depression Brother        Review of Systems   10 point review of systems is negative with the exception of HPI and PMH.       Physical Exam   Temp: 98.6  F (37  C) Temp src: Oral BP: 130/81 Pulse: 83   Resp: 16 SpO2: 99 % O2 Device: None (Room air)    Vital Signs with Ranges  Temp:  [97.6  F (36.4  C)-98.6  F (37  C)] 98.6  F (37  C)  Pulse:  [75-89] 83  Resp:  [16-20] 16  BP: ()/(51-84) 130/81  SpO2:  [90 %-99 %] 99 %  0 lbs 0 oz     , Blood pressure 130/81, pulse 83, temperature 98.6  F (37  C), temperature source Oral, resp. rate 16, last menstrual period 02/21/2007, SpO2 99 %, not currently breastfeeding.  0 lbs 0 oz  HEENT:  Normocephalic, atraumatic.  PERRLA.  EOM s intact.   Neck:  Supple, non-tender, without lymphadenopathy.  Heart:  No peripheral edema  Lungs:  No SOB  Abdomen:  Soft, non-tender, non-distended.  Skin:  Warm and dry, good capillary refill.  Extremities:  Good radial and dorsalis pedis pulses bilaterally, no edema, cyanosis or clubbing.    NEUROLOGICAL EXAMINATION:    Mental status:  Alert and Oriented x 3, speech is fluent.  Cranial nerves:  II-XII intact.   Motor:  Strength is 5/5 throughout the upper and lower extremities   Hip Flexor:                Right: 5/5  Left:  5/5  Hip Adductor:             Right:  5/5  Left:  5/5  Hip Abductor:             Right:  5/5  Left:  5/5  Left quad 4-/5  Gastroc Soleus:        Right:  5/5  Left:  5/5  Tib/Ant:                      Right:  5/5  Left:  3+/5  EHL:                     Right:  5/5  Left:  5/5  Sensation:  intact  Reflexes:   Negative Babinski.  Negative Clonus.    Coordination:  Smooth finger to nose testing.   Negative pronator drift.  Smooth tandem walking  Gait:  Stable, no difficulty with heel or toe walking.    Lumbar examination reveals no tenderness of the spine or paraspinous muscles.  Hip height is symmetrical. Negative SI joint, sciatic notch or greater trochanteric tenderness to palpation bilaterally.  Straight leg raise is negative bilaterally.       Data   All new lab and imaging data was personally reviewed by me.    CT:    MRI:EXAM: MR LUMBAR SPINE W/O and W CONTRAST  LOCATION: [facility]  DATE/TIME: 10/3/2022 9:18 PM     INDICATION: Back pain and incontinence.  COMPARISON: 09/22/2022.  CONTRAST: 7.5mL Gadavist  TECHNIQUE: Routine Lumbar Spine MRI without and with IV contrast.     FINDINGS:   Nomenclature is based on 5 lumbar type vertebral bodies. There is good anatomic alignment of the lumbar spine. The vertebral body heights are well-maintained throughout and have appropriate signal characteristics for the patient's age. The patient is   status post anterior spinal fusion with metallic hardware and interbody bone spacers at the L4-L5 and L5-S1 disc space levels. This is stable in the interval. Normal distal spinal cord and cauda equina with conus medullaris at T12. There is no abnormal   signal or change in size of the conus medullaris. There is no evidence of an intracanal mass  or hemorrhage. Following the administration of contrast no abnormal enhancement is noted involving the cauda equina.. There are postsurgical changes status post   a laminectomy at the L5-S1 disc space level. Unremarkable visualized bony pelvis.     T12-L1: Normal disc height and signal. No herniation. Normal facets. No spinal canal or neural foraminal stenosis.      L1-L2: Normal disc height and signal. No herniation. Normal facets. No spinal canal or neural foraminal stenosis.     L2-L3: Normal disc height and signal. No herniation. Normal facets. No spinal canal or neural foraminal stenosis.      L3-L4: There is a normal disc space height and signal. There is a rim-enhancing free fragment again noted in the left paracentral region inferiorly which may lead to displacement of the left L4 nerve  and left lateral recess narrowing. The neural foramen   are patent bilaterally. Stable in the interval.     L4-L5: Status post anterior spinal fusion. No evidence of canal compromise or neural foraminal narrowing.     L5-S1: Status post anterior spinal fusion and a posterior decompressive laminectomy. There is no evidence of canal compromise or neural foraminal narrowing.                                                                      IMPRESSION:  1.  Status post anterior spinal fusion at the L4-L5 and L5-S1 disc space levels with no evidence of canal compromise or neural foraminal narrowing.  2.  Stable rim enhancing probable free fragment in left paracentral region inferior to the L3-L4 disc space most likely leading to displacement of exiting left L4 nerve root and left lateral recess narrowing.    CBC RESULTS:   Recent Labs   Lab Test 10/03/22  1649   WBC 7.3   RBC 3.99   HGB 13.1   HCT 38.4   MCV 96   MCH 32.8   MCHC 34.1   RDW 11.3        Basic Metabolic Panel:  Lab Results   Component Value Date     10/03/2022     06/24/2018      Lab Results   Component Value Date    POTASSIUM 4.4 10/03/2022     POTASSIUM 3.8 06/24/2018     Lab Results   Component Value Date    CHLORIDE 109 10/03/2022    CHLORIDE 108 06/24/2018     Lab Results   Component Value Date    ISAAC 8.4 10/03/2022    ISAAC 8.4 06/24/2018     Lab Results   Component Value Date    CO2 25 10/03/2022    CO2 26 06/24/2018     Lab Results   Component Value Date    BUN 22 10/03/2022    BUN 14 06/24/2018     Lab Results   Component Value Date    CR 0.69 10/03/2022    CR 0.74 06/24/2018     Lab Results   Component Value Date    GLC 90 10/03/2022    GLC 79 06/24/2018     INR:  Lab Results   Component Value Date    INR 1.11 07/08/2010    INR 1.05 01/07/2010    INR 0.97 10/24/2009    INR 1.14 09/30/2006    INR 1.10 07/22/2005

## 2022-10-04 NOTE — ED NOTES
ED SIGNOUT Note    CC:      Chief Complaint   Patient presents with     Back Pain        Sign-out received from Dr. Tor Butterfield  HPI: Jennifer Reyes is a 52 year old female seen in the emergency department and signed out to me at shift change. Please refer to the ED provider note.  Patient has had 17 days of severe left-sided back and leg pain, initially triggered by lifting a heavy piece of log.  Patient was helping her  with taking down a tree, and as she was lifting, she felt a searing pain through her back and into the left leg.  She has had previous back surgery back in 2003 with anterior lumbar fusion with Dr. Block.  Since her injury, her back pain has lessened but the pain in the left leg has intensified.  She started to notice a left foot drop with difficulty dorsiflexing the left foot and ankle.  She still has sensation that is intact but has numbness down the lateral aspect of the leg.  She has not been able to get any relief.  There has been no bowel or bladder symptoms.        Medical records reviewed     Physical Exam: Please see ED Provider Note  Initial vitals were reviewed  Last vitals: Blood pressure 98/58, pulse 75, temperature 97.6  F (36.4  C), temperature source Temporal, resp. rate 18, weight 70.3 kg (155 lb), last menstrual period 02/21/2007, SpO2 92 %, not currently breastfeeding.      Labs/Imaging:  Labs Ordered and Resulted from Time of ED Arrival to Time of ED Departure   COMPREHENSIVE METABOLIC PANEL - Abnormal       Result Value    Sodium 138      Potassium 4.4      Chloride 109      Carbon Dioxide (CO2) 25      Anion Gap 4      Urea Nitrogen 22      Creatinine 0.69      Calcium 8.4 (*)     Glucose 90      Alkaline Phosphatase 61      AST 27      ALT 41      Protein Total 6.5 (*)     Albumin 3.5      Bilirubin Total 0.3      GFR Estimate >90     COVID-19 VIRUS (CORONAVIRUS) BY PCR - Normal    SARS CoV2 PCR Negative     CBC WITH PLATELETS AND DIFFERENTIAL    WBC Count 7.3       RBC Count 3.99      Hemoglobin 13.1      Hematocrit 38.4      MCV 96      MCH 32.8      MCHC 34.1      RDW 11.3      Platelet Count 217      % Neutrophils 74      % Lymphocytes 14      % Monocytes 9      % Eosinophils 2      % Basophils 1      % Immature Granulocytes 0      NRBCs per 100 WBC 0      Absolute Neutrophils 5.5      Absolute Lymphocytes 1.0      Absolute Monocytes 0.6      Absolute Eosinophils 0.1      Absolute Basophils 0.1      Absolute Immature Granulocytes 0.0      Absolute NRBCs 0.0       MRI Lumbar Spine  IMPRESSION:  1.  Status post anterior spinal fusion at the L4-L5 and L5-S1 disc space levels with no evidence of canal compromise or neural foraminal narrowing.  2.  Stable rim enhancing probable free fragment in left paracentral region inferior to the L3-L4 disc space most likely leading to displacement of exiting left L4 nerve root and left lateral recess narrowing.      IMPRESSION:   Final diagnoses:   Lumbar radiculopathy   Weakness of left lower extremity       ED COURSE/MEDICAL DECISION MAKING  Jennifer Reyes is a 52 year old female signed out to me at the change of shift.  Patient has severe intractable left leg pain with a 4-5 myelopathy, and radiculopathy.  Over the last 1 to 2 days, she has noticed the foot drop and weakness. Patient had her lumbar MRI repeated and there is no significant change.  She has a rim-enhancing probable free fragment in the left paracentral region inferior to the L3-L4 disc space leading to displacement of the exiting left L4 nerve root and left lateral recess narrowing.  Her symptoms are consistent with an L4/5 myelopathy.  Patient is awaiting bed placement at [facility].  Isela Riddle working with Dr. Solis, neurosurgeon, is aware of the patient's condition.  We are hoping for an available MedSurg bed at [facility] later this morning.  Overnight, patient reports that her pain is still difficult to manage.  She has received Dilaudid  at 2 AM and again at 5 AM.  She is already asking for some additional pain medication now it 6:45 AM.    Patient was signed out to Dr. Salgado at the change of shift.          Disclaimer: This note consists of words and symbols derived from keyboarding and dictation using voice recognition software.  As a result, there may be errors that have gone undetected.  Please consider this when interpreting information found in this note.          Nahomi Guzman MD  10/04/22 0659       Nahomi Guzman MD  10/04/22 0700

## 2022-10-04 NOTE — ED PROVIDER NOTES
Patient remained vitally stable.  Still requiring IV Dilaudid but in decreased frequency due to the fentanyl patch.  I spoke to Dr. Barth the hospitalist service at [facility].  She will assume care in ambulance transfer.  Bed availability after 12:30 PM.     Callum Salgado MD  10/04/22 1104

## 2022-10-04 NOTE — H&P
[facility] [place]    History and Physical - Hospitalist Service       Date of Admission:  10/4/2022  PRIMARY CARE PROVIDER:    Marni Montemayor    Assessment & Plan   Jennifer Reyes is a 52 year old female who was admitted on 10/4/2022.      Past medical history significant for known lumbar (L3-L4) disc herniation, Chronic low back pain, Idiopathic Cardiomyopathy, MDD with anxiety, Persistent insomnia, Seasonal allergies, Rhinoconjunctivits, History of Chemical dependency who was directly admitted to [facility] due to L3-L4 HNP.     Patient presented to [facility] ED on 10/3/2022 due to worsening low back pain with weakness.  Per ED note, ~ 16-17 days prior to presentation she developed abrupt lower lumbar spinal pain after bending over and lifting up a log.  She presented the following day to the ED and was diagnosed with L3-L4 herniation.  Since then, she was seen by pain specialist and underwent a local injection.  In the past 3 days, prior to presentation she developed increasing/worsening left lower extremity weakness and feels as though she has been developing a foot drop.  She also has noticed some numbness over her left calf and knee.  Patient described her pain as severe and worsened by certain positioning.       Work-up in the ED included a CMP that revealed a calcium level of 8.4, total protein of 6.5 otherwise within normal limits.  CBC with differential was unremarkable.  COVID-19 PCR negative.  Head CT was negative.  Head/Neck CTA were normal. Lumbar spine MRI with and w/o contract s/p anterior spinal fusion at the L4-L5 and L5-S1 disc space levels with no evidence of canal compromise neural foraminal narrowing and stable rim enhancing probable free fragment in left paracentral region inferior to the L3-L4 disc space most likely leading to displacement of exiting left L4 nerve root and left lateral recess narrowing.  Neurosurgery on-call was contacted and  recommended decadron 10 mg IV and initially recommended discharge with outpatient follow-up but due to severe pain requiring IV pain medications she was transferred to [facility] for ongoing pain management and Neurosurgery assessment.       L3-L4 HNP  Known L3-L4 disc herniation  Chronic low back pain  - IV decadron 10 mg/d.    - Neurosurgery consult requested.    - NPO at midnight.    - Analgesic management: Notably Fentanyl patch was placed in the ED and present on examination.                --APAP 975 mg every 8 hours.     --PRN IV Dilaudid 0.5 mg every 3 hours.     --PRN PO Oxycodone 5 mg every 4 hours.     --Flexeril 10 mg TID.   --Lidoderm patch.      - Resumed on PTA gabapentin 300 mg/d.    - Hold PTA Voltaren 75 mg/d.    ADDENDUM:  Contacted by Pharmacy, patient is taking gabapentin 300 mg TID.  Will resume this frequency.       MDD with anxiety  - Resumed on PTA Effexor  mg/d.       Persistent insomnia   - Resumed on PTA PRN trazodone  mg at bedtime.    ADDENDUM:  Contacted by Pharmacy, will adjust Trazodone to reflect range of  mg.       Seasonal allergies  Rhinoconjunctivits  - Hold PTA Singulair 10 mg at bedtime and olopatadine eye drops.    - Resumed on PTA Claritin 10 mg/d.     History of Chemical dependency (Opiods, treated in 2015)  - Analgesic management      History of idiopathic CM  Last ECHO with normalized EF.  History of abnormal ECHO with EF of 45% in 2010 after presenting with chest pain and noted elevated troponin.  Cardiac cath without obstructive disease.    - No interventions at this time.      Clinically Significant Risk Factors Present on Admission          # Hypocalcemia: Ca = 8.4 mg/dL (Ref range: 8.5 - 10.1 mg/dL) and/or iCa = N/A on admission, will replace as needed                      Diet: NPO per Anesthesia Guidelines for Procedure/Surgery Except for: Meds  Combination Diet Regular Diet Adult  DVT Prophylaxis: Pneumatic Compression Devices  Miles Catheter: Not  present  Central Lines: None  Cardiac Monitoring: None  Code Status: Full Code         Disposition Plan   Admitted under inpatient status due to severe left sided low back pain with severe left LE radiculopathy and weakness.  Ancipitate patient will be hospitalized for at least 2 evenings while undergoing continued evaluation and management.  Patient is currently requiring IV pain medications and attempts to manage her severe pain.       The patient's care was discussed with the Patient.    The patient has been discussed with Dr. Merlos, who agrees with the assessment and plan at this time.    Arnoldo Maier PA-C  [facility]  Securely message with the Vocera Web Console (learn more here)  Text page via [facility] Paging/Directory    ______________________________________________________________________    Chief Complaint   Direct admit due to L3-L4 HNP.    History is obtained from the patient and EMR.      History of Present Illness   Jennifer Reyes is a 52 year old female with a past medical history significant for known lumbar (L3-L4) disc herniation, Chronic low back pain, Idiopathic Cardiomyopathy, MDD with anxiety, Persistent insomnia, Seasonal allergies, Rhinoconjunctivits, History of Chemical dependency who was directly admitted to [facility] due to L3-L4 HNP.     Patient presented to [facility] ED on 10/3/2022 due to worsening low back pain with weakness.  Per ED note, ~ 16-17 days prior to presentation she developed abrupt lower lumbar spinal pain after bending over and lifting up a log.  She presented the following day to the ED and was diagnosed with L3-L4 herniation.  Since then, she was seen by pain specialist and underwent a local injection.  In the past 3 days, prior to presentation she developed increasing/worsening left lower extremity weakness and feels as though she has been developing a foot drop.  She also has noticed some numbness over her left calf  and knee.  Patient described her pain as severe and worsened by certain positioning.       Work-up in the ED included a CMP that revealed a calcium level of 8.4, total protein of 6.5 otherwise within normal limits.  CBC with differential was unremarkable.  COVID-19 PCR negative.  Head CT was negative.  Head/Neck CTA were normal. Lumbar spine MRI with and w/o contract s/p anterior spinal fusion at the L4-L5 and L5-S1 disc space levels with no evidence of canal compromise neural foraminal narrowing and stable rim enhancing probable free fragment in left paracentral region inferior to the L3-L4 disc space most likely leading to displacement of exiting left L4 nerve root and left lateral recess narrowing.  Neurosurgery on-call was contacted and recommended decadron 10 mg IV and initially recommended discharge with outpatient follow-up but due to severe pain requiring IV pain medications she was transferred to [facility] for ongoing pain management and Neurosurgery assessment.      Patient was evaluated in her hospital room where she was lying on her right side with an ice pack on her left side of her low back.  Initially, reviewed her medical and surgical history as well as some of her home medications.  We reviewed events that led to patient's transfer to [facility].  Patient indicated that her numbness and tingling is not affecting her left thigh but instead her left medial calf.  She indicated all of this began about 17 days ago when she bent forward and attempted to lift up a log.  Since then she is undergone an epidural steroid injection and has undergone 2 separate lumbar MRI examinations.    Upon questioning, patient indicates that her left leg feels weak.  She has had some issues with constipation due to the pain medication she has been taking.  Her most recent injury was 17 days ago when attempting to lift up a log when she abruptly developed severe low back pain radiating into her left leg.  She is having  numbness in her left medial calf.  She also feels as though she has developed a left foot drop.    Review of Systems    The 10 point Review of Systems is negative other than noted in the HPI.      Past Medical History    I have reviewed this patient's medical history and updated it with pertinent information if needed.   Past Medical History:   Diagnosis Date     Anemia, unspecified      Anxiety state, unspecified      Apnea      Contact dermatitis and other eczema due to drugs and medicines in contact with skin 05/16/2007    Allergic reaction to lavendar aroma oil.     Contact dermatitis and other eczema due to plants (except food)      Depressive disorder, not elsewhere classified     had since 15, well controlled     Depressive disorder, not elsewhere classified 11/09/08    Suicidal ideation - [facility] admission     Displacement of lumbar intervertebral disc without myelopathy     L4-5     Duodenitis with hemorrhage 11/09/08     Erythema multiforme 07/22/2005    With secondary cellulitis, poison ivy rxn.  [facility] admit.     Lumbago 02/20/10    Transfer to [facility]     Myocardial infarction (H) 2010    idiopathic cardiomyopathy     Other pulmonary insufficiency, not elsewhere classified     Vocal cord dysfunction     Poisoning and toxic reactions caused by other plants      Pulmonary insufficiency following trauma and surgery 05/28/06    Admit.Discharged 06/01/06     Syncope 7/7/10    Elevated troponin, transfer to [facility]     Syncope and collapse     due to hypotension   Known lumbar (L3-L4) disc herniation, Chronic low back pain, Idiopathic Cardiomyopathy, MDD with anxiety, Persistent insomnia, Seasonal allergies, Rhinoconjunctivits, History of Chemical dependency (Opioids, treated 2015)    Past Surgical History   I have reviewed this patient's surgical history and updated it with pertinent information if needed.  Past Surgical History:   Procedure Laterality Date     COLONOSCOPY  10/2020    Large  sessile serrated adenoma, repeat 3 years     HC DEBRIDE SKIN/SUBQ TISSUE  11/16/2009    Post-op wound     HC INJ EPIDURAL LUMBAR/SACRAL W/WO CONTRAST  2009    Left L4-5     HC LAP,FULGURATE/EXCISE LESIONS  02/27/2007    Dx lap, fulguration of endometriosis.  [facility]     HC REMOVAL OF TONSILS,<13 Y/O  Age 7 or 8     HC TOOTH EXTRACTION W/FORCEP  Age 17    [place] teeth extraction x4.     HYSTERECTOMY      approx 2008 - ovaries still in, unsure about cervix. no cancer. had fibroids.     HYSTERECTOMY, PAP NO LONGER INDICATED       LAMINECT/DISCECTOMY, LUMBAR  10/24/2009     TUBAL LIGATION  02/27/2007    [facility] LUMBAR SPINE FUSION,ANTER APPRCH  2012    L4 and L5 to S1     [facility] UGI ENDOSCOPY, SIMPLE EXAM  11/08/2008       Social History   I have reviewed this patient's social history and updated it with pertinent information if needed.  Patient resides in a house in [place], [place] with her  and 18-year-old daughter.  She has never smoked.  She consumes alcohol socially, mainly on weekends with 2 glasses of wine on Friday and 3 glass of wine on Saturday.  She has a history of opioid dependence/abuse and underwent treatment in 2015.  Within the last week she has been utilizing a walker.  Social History     Tobacco Use     Smoking status: Never Smoker     Smokeless tobacco: Never Used   Substance Use Topics     Alcohol use: Yes     Comment: socially     Drug use: No        Family History   I have reviewed this patient's family history and updated it with pertinent information if needed.   Family History   Problem Relation Age of Onset     Anxiety Disorder Father      Heart Failure Father      Depression Father      Alcohol/Drug Father      Anxiety Disorder Brother      Bipolar Disorder Sister      Suicide Paternal Uncle      Heart Failure Mother      Breast Cancer Maternal Aunt      Cancer Maternal Grandfather         stomach     Cancer Paternal Grandmother         stomach     Depression Brother         Prior to Admission Medications   Prior to Admission Medications   Prescriptions Last Dose Informant Patient Reported? Taking?   EPINEPHrine (ANY BX GENERIC EQUIV) 0.3 MG/0.3ML injection 2-pack  Self No No   Sig: Inject 0.3 mLs (0.3 mg) into the muscle as needed for anaphylaxis   HYDROcodone-acetaminophen (NORCO) 5-325 MG tablet  Self Yes No   Sig: Take 1 tablet by mouth 4 times daily as needed for pain   azelastine (ASTELIN) 0.1 % nasal spray  Self No No   Sig: Spray 2 sprays into both nostrils 2 times daily   diclofenac (VOLTAREN) 75 MG EC tablet  Self Yes No   Sig: Take 75 mg by mouth daily   fluticasone (FLONASE) 50 MCG/ACT nasal spray   No No   Sig: Spray 2 sprays into both nostrils At Bedtime   Patient taking differently: Spray 2 sprays into both nostrils daily as needed   gabapentin (NEURONTIN) 300 MG capsule  Self Yes No   Sig: Take 300 mg by mouth daily   loratadine (CLARITIN) 10 MG tablet  Self Yes No   Sig: Take 10 mg by mouth daily   methylPREDNISolone (MEDROL DOSEPAK) 4 MG tablet therapy pack  Self No No   Sig: Follow Package Directions   montelukast (SINGULAIR) 10 MG tablet  Self No No   Sig: Take 1 tablet (10 mg) by mouth At Bedtime   Patient not taking: Reported on 10/3/2022   naproxen sodium (ANAPROX) 220 MG tablet   Yes No   Sig: Take 220 mg by mouth 2 times daily as needed for moderate pain   olopatadine (PATANOL) 0.1 % ophthalmic solution  Self Yes No   Sig: Apply 1 drop to eye 2 times daily   traZODone (DESYREL) 100 MG tablet  Self No No   Sig: TAKE 1/2 TO 1 TAB BY MOUTH NIGHTLY AS NEEDED FOR SLEEP   Patient taking differently: 100 mg TAKE 1/2 TO 1 TAB BY MOUTH NIGHTLY AS NEEDED FOR SLEEP   venlafaxine (EFFEXOR XR) 150 MG 24 hr capsule  Self No No   Sig: Take 1 capsule (150 mg) by mouth daily   vitamin D3 (CHOLECALCIFEROL) 50 mcg (2000 units) tablet  Self No No   Sig: Take 1 tablet (50 mcg) by mouth daily      Facility-Administered Medications: None     Allergies   Allergies   Allergen  Reactions     Banana Hives     Same with kiwi     Cats      Morphine Hives     Peanuts [Nuts]      Patient states she is allergic to all tree nuts.     Rocephin [Ceftriaxone]        Physical Exam   /81   Pulse 83   Temp 98.6  F (37  C) (Oral)   Resp 16   LMP 02/21/2007 (Exact Date)   SpO2 99%     Constitutional: Awake, alert, cooperative.  Appears uncomfortable with certain positions but overall in no apparent distress.  ENT: Normocephalic, without obvious abnormality, atraumatic, oral pharynx with moist mucus membranes, tonsils without erythema or exudates.  Eyes pupils are equal, round and reactive to light; extra occular movements intact.  Normal sclera.    Neck: Supple, symmetrical, trachea midline, no adenopathy.  Pulmonary: No increased work of breathing, good air exchange, clear to auscultation bilaterally, no crackles or wheezing.  Cardiovascular: Regular rate and rhythm, normal S1 and S2, no S3 or S4, and no murmur noted.  GI: Normal bowel sounds, soft, non-distended, non-tender.    Skin/Integumen: Visualized skin appeared clear.  Neuro: CN II-XII grossly intact.  Upper extremities strength, coordination and sensation intact bilaterally.  Decreased left lower extremity strength noted.  Patient is able to lift her left leg off the bed briefly but appears to be struggling due to weakness as well as pain.  She is unable to keep this elevated with resistance.  Dorsal flexion of the left lower extremity is also significantly weaker than the right.  Patient also has a positive straight leg raise of the left lower extremity.  Psych:  Alert and oriented x 3. Normal affect.  Extremities: No lower extremity edema noted, and calves are non-tender to palpation bilaterally.     Data   Data reviewed today: I reviewed all medications, new labs and imaging results over the last 24 hours. I personally reviewed no images or EKG's today.    Recent Labs   Lab 10/03/22  1649   WBC 7.3   HGB 13.1   MCV 96          POTASSIUM 4.4   CHLORIDE 109   CO2 25   BUN 22   CR 0.69   ANIONGAP 4   ISAAC 8.4*   GLC 90   ALBUMIN 3.5   PROTTOTAL 6.5*   BILITOTAL 0.3   ALKPHOS 61   ALT 41   AST 27     Recent Results (from the past 24 hour(s))   MR Lumbar Spine w/o & w Contrast    Narrative    EXAM: MR LUMBAR SPINE W/O and W CONTRAST  LOCATION: [facility]  DATE/TIME: 10/3/2022 9:18 PM    INDICATION: Back pain and incontinence.  COMPARISON: 09/22/2022.  CONTRAST: 7.5mL Gadavist  TECHNIQUE: Routine Lumbar Spine MRI without and with IV contrast.    FINDINGS:   Nomenclature is based on 5 lumbar type vertebral bodies. There is good anatomic alignment of the lumbar spine. The vertebral body heights are well-maintained throughout and have appropriate signal characteristics for the patient's age. The patient is   status post anterior spinal fusion with metallic hardware and interbody bone spacers at the L4-L5 and L5-S1 disc space levels. This is stable in the interval. Normal distal spinal cord and cauda equina with conus medullaris at T12. There is no abnormal   signal or change in size of the conus medullaris. There is no evidence of an intracanal mass or hemorrhage. Following the administration of contrast no abnormal enhancement is noted involving the cauda equina.. There are postsurgical changes status post   a laminectomy at the L5-S1 disc space level. Unremarkable visualized bony pelvis.    T12-L1: Normal disc height and signal. No herniation. Normal facets. No spinal canal or neural foraminal stenosis.     L1-L2: Normal disc height and signal. No herniation. Normal facets. No spinal canal or neural foraminal stenosis.    L2-L3: Normal disc height and signal. No herniation. Normal facets. No spinal canal or neural foraminal stenosis.     L3-L4: There is a normal disc space height and signal. There is a rim-enhancing free fragment again noted in the left paracentral region inferiorly which may lead to  displacement of the left L4 nerve  and left lateral recess narrowing. The neural foramen   are patent bilaterally. Stable in the interval.    L4-L5: Status post anterior spinal fusion. No evidence of canal compromise or neural foraminal narrowing.    L5-S1: Status post anterior spinal fusion and a posterior decompressive laminectomy. There is no evidence of canal compromise or neural foraminal narrowing.      Impression    IMPRESSION:  1.  Status post anterior spinal fusion at the L4-L5 and L5-S1 disc space levels with no evidence of canal compromise or neural foraminal narrowing.  2.  Stable rim enhancing probable free fragment in left paracentral region inferior to the L3-L4 disc space most likely leading to displacement of exiting left L4 nerve root and left lateral recess narrowing.   CT Head w/o Contrast    Narrative    EXAM: CTA HEAD NECK W CONTRAST, CT HEAD W/O CONTRAST  LOCATION: [facility]  DATE/TIME: 10/3/2022 10:56 PM    INDICATION: LLE weakness  COMPARISON: None.  CONTRAST: 80mL Isovue 370  TECHNIQUE: Head and neck CT angiogram with IV contrast. Noncontrast head CT followed by axial helical CT images of the head and neck vessels obtained during the arterial phase of intravenous contrast administration. Axial 2D reconstructed images and   multiplanar 3D MIP reconstructed images of the head and neck vessels were performed by the technologist. Dose reduction techniques were used. All stenosis measurements made according to NASCET criteria unless otherwise specified.    FINDINGS:   NONCONTRAST HEAD CT:   INTRACRANIAL CONTENTS: No intracranial hemorrhage, extraaxial collection, or mass effect.  No CT evidence of acute infarct. Normal parenchymal attenuation. Normal ventricles and sulci. Empty sella morphology.    VISUALIZED ORBITS/SINUSES/MASTOIDS: No intraorbital abnormality. No paranasal sinus mucosal disease. No middle ear or mastoid effusion.    BONES/SOFT TISSUES: No acute  abnormality.    HEAD CTA:  ANTERIOR CIRCULATION: No stenosis/occlusion, aneurysm, or high flow vascular malformation. Standard [place] anatomy.    POSTERIOR CIRCULATION: No stenosis/occlusion, aneurysm, or high flow vascular malformation. Dominant right and smaller left vertebral artery contribute to a normal basilar artery.     DURAL VENOUS SINUSES: Expected enhancement of the major dural venous sinuses.    NECK CTA:  RIGHT CAROTID: No measurable stenosis or dissection.    LEFT CAROTID: No measurable stenosis or dissection.    VERTEBRAL ARTERIES: No focal stenosis or dissection. Dominant right and smaller left vertebral arteries.    AORTIC ARCH: Classic aortic arch anatomy with no significant stenosis at the origin of the great vessels.    NONVASCULAR STRUCTURES: Unremarkable.      Impression    IMPRESSION:   HEAD CT:  1.  No acute intracranial process.    HEAD CTA:   1.  Normal CTA [place].    NECK CTA:  1.  Normal neck CTA.   CTA Head Neck with Contrast    Narrative    EXAM: CTA HEAD NECK W CONTRAST, CT HEAD W/O CONTRAST  LOCATION: [facility]  DATE/TIME: 10/3/2022 10:56 PM    INDICATION: LLE weakness  COMPARISON: None.  CONTRAST: 80mL Isovue 370  TECHNIQUE: Head and neck CT angiogram with IV contrast. Noncontrast head CT followed by axial helical CT images of the head and neck vessels obtained during the arterial phase of intravenous contrast administration. Axial 2D reconstructed images and   multiplanar 3D MIP reconstructed images of the head and neck vessels were performed by the technologist. Dose reduction techniques were used. All stenosis measurements made according to NASCET criteria unless otherwise specified.    FINDINGS:   NONCONTRAST HEAD CT:   INTRACRANIAL CONTENTS: No intracranial hemorrhage, extraaxial collection, or mass effect.  No CT evidence of acute infarct. Normal parenchymal attenuation. Normal ventricles and sulci. Empty sella  morphology.    VISUALIZED ORBITS/SINUSES/MASTOIDS: No intraorbital abnormality. No paranasal sinus mucosal disease. No middle ear or mastoid effusion.    BONES/SOFT TISSUES: No acute abnormality.    HEAD CTA:  ANTERIOR CIRCULATION: No stenosis/occlusion, aneurysm, or high flow vascular malformation. Standard [place] anatomy.    POSTERIOR CIRCULATION: No stenosis/occlusion, aneurysm, or high flow vascular malformation. Dominant right and smaller left vertebral artery contribute to a normal basilar artery.     DURAL VENOUS SINUSES: Expected enhancement of the major dural venous sinuses.    NECK CTA:  RIGHT CAROTID: No measurable stenosis or dissection.    LEFT CAROTID: No measurable stenosis or dissection.    VERTEBRAL ARTERIES: No focal stenosis or dissection. Dominant right and smaller left vertebral arteries.    AORTIC ARCH: Classic aortic arch anatomy with no significant stenosis at the origin of the great vessels.    NONVASCULAR STRUCTURES: Unremarkable.      Impression    IMPRESSION:   HEAD CT:  1.  No acute intracranial process.    HEAD CTA:   1.  Normal CTA [place].    NECK CTA:  1.  Normal neck CTA.

## 2022-10-04 NOTE — PHARMACY-ADMISSION MEDICATION HISTORY
Pharmacy Medication History  Admission medication history interview status for the 10/4/2022  admission is complete. See EPIC admission navigator for prior to admission medications     Location of Interview: Patient room  Medication history sources: Patient, Surescripts and Care Everywhere    Significant changes made to the medication list:  Added tylenol, flexeril  Removed medrol dosepak - completed   Adjusted:   -azelastine to PRN (takes seasonally)   -voltaren daily --> BID (per patient and fill hx)   -gabapentin daily --> TID (per patient and fill hx)   -norco 4x daily PRN --> q4h PRN (per patient and fill hx)   -olopatadine to PRN (takes seasonally)   -trazodone 100 mg -->  mg per dose (patient states dose depends on the night, but does take range of doses)     In the past week, patient estimated taking medication this percent of the time: greater than 90%    Additional medication history information:   Patient is a reliable historian.     Medication reconciliation completed by provider prior to medication history? Yes    Time spent in this activity: 20 minutes    Medication Sig Last Dose Taking? Auth Provider   acetaminophen (TYLENOL) 500 MG tablet Take 500 mg by mouth every 6 hours as needed for mild pain Past Week at PRN Yes Unknown, Entered By History   azelastine (ASTELIN) 0.1 % nasal spray Spray 2 sprays into both nostrils 2 times daily  Patient taking differently: Spray 2 sprays into both nostrils 2 times daily as needed Takes seasonally only More than a month at Unknown time Yes Marni Montemayor MD   cyclobenzaprine (FLEXERIL) 10 MG tablet Take 10 mg by mouth 3 times daily as needed for muscle spasms 10/3/2022 at AM Yes Unknown, Entered By History   diclofenac (VOLTAREN) 75 MG EC tablet Take 75 mg by mouth 2 times daily 10/3/2022 at AM Yes Reported, Patient   EPINEPHrine (ANY BX GENERIC EQUIV) 0.3 MG/0.3ML injection 2-pack Inject 0.3 mLs (0.3 mg) into the muscle as needed for  anaphylaxis  at has home supply Yes Marni Montemayor MD   fluticasone (FLONASE) 50 MCG/ACT nasal spray Spray 2 sprays into both nostrils At Bedtime  Patient taking differently: Spray 2 sprays into both nostrils daily as needed More than a month at Unknown time Yes Marni Montemayor MD   gabapentin (NEURONTIN) 300 MG capsule Take 300 mg by mouth 3 times daily 10/3/2022 at Unknown time Yes Reported, Patient   HYDROcodone-acetaminophen (NORCO) 5-325 MG tablet Take 1 tablet by mouth every 4 hours as needed for pain 10/3/2022 at PRN Yes Reported, Patient   loratadine (CLARITIN) 10 MG tablet Take 10 mg by mouth daily 10/3/2022 at AM Yes Reported, Patient   montelukast (SINGULAIR) 10 MG tablet Take 1 tablet (10 mg) by mouth At Bedtime  Patient taking differently: Take 10 mg by mouth nightly as needed Takes seasonally only More than a month at Unknown time Yes Marni Montemayor MD   naproxen sodium (ANAPROX) 220 MG tablet Take 220 mg by mouth 2 times daily as needed for moderate pain 10/3/2022 at AM Yes Reported, Patient   olopatadine (PATANOL) 0.1 % ophthalmic solution Apply 1 drop to eye 2 times daily as needed Takes seasonally only More than a month at Unknown time Yes Reported, Patient   traZODone (DESYREL) 100 MG tablet TAKE 1/2 TO 1 TAB BY MOUTH NIGHTLY AS NEEDED FOR SLEEP 10/3/2022 at PM Yes Marni Montemayor MD   venlafaxine (EFFEXOR XR) 150 MG 24 hr capsule Take 1 capsule (150 mg) by mouth daily 10/4/2022 at AM Yes Marni Montemayor MD   vitamin D3 (CHOLECALCIFEROL) 50 mcg (2000 units) tablet Take 1 tablet (50 mcg) by mouth daily More than a month at Unknown time Yes Marni Montemayor MD       The information provided in this note is only as accurate as the sources available at the time of update(s)   Maria Teresa Sharma PharmD

## 2022-10-04 NOTE — PLAN OF CARE
Goal Outcome Evaluation:      Pt arrived to floor at around 1420, direct admit. Hospitalist aware of her arrival. Orders awaiting varification from pharmacy, will inform incoming shift. Pain rated 7/10, pt appears comfortable in bed on her phone. States that she has numbness in her LLE below the knee. Foot drop noted in LLE, with weak dorsi and planterflexion. Weakness in LLE with limited movement. discharge pending.

## 2022-10-05 ENCOUNTER — ANESTHESIA EVENT (OUTPATIENT)
Dept: SURGERY | Facility: CLINIC | Age: 52
End: 2022-10-05
Payer: COMMERCIAL

## 2022-10-05 ENCOUNTER — ANESTHESIA (OUTPATIENT)
Dept: SURGERY | Facility: CLINIC | Age: 52
End: 2022-10-05
Payer: COMMERCIAL

## 2022-10-05 ENCOUNTER — APPOINTMENT (OUTPATIENT)
Dept: GENERAL RADIOLOGY | Facility: CLINIC | Age: 52
End: 2022-10-05
Attending: INTERNAL MEDICINE
Payer: COMMERCIAL

## 2022-10-05 LAB
ANION GAP SERPL CALCULATED.3IONS-SCNC: 7 MMOL/L (ref 3–14)
BUN SERPL-MCNC: 21 MG/DL (ref 7–30)
CALCIUM SERPL-MCNC: 8.9 MG/DL (ref 8.5–10.1)
CHLORIDE BLD-SCNC: 108 MMOL/L (ref 94–109)
CO2 SERPL-SCNC: 24 MMOL/L (ref 20–32)
CREAT SERPL-MCNC: 0.59 MG/DL (ref 0.52–1.04)
ERYTHROCYTE [DISTWIDTH] IN BLOOD BY AUTOMATED COUNT: 11.3 % (ref 10–15)
GFR SERPL CREATININE-BSD FRML MDRD: >90 ML/MIN/1.73M2
GLUCOSE BLD-MCNC: 102 MG/DL (ref 70–99)
GLUCOSE BLDC GLUCOMTR-MCNC: 110 MG/DL (ref 70–99)
HCT VFR BLD AUTO: 36.8 % (ref 35–47)
HGB BLD-MCNC: 12.6 G/DL (ref 11.7–15.7)
MAGNESIUM SERPL-MCNC: 2.2 MG/DL (ref 1.6–2.3)
MCH RBC QN AUTO: 32.6 PG (ref 26.5–33)
MCHC RBC AUTO-ENTMCNC: 34.2 G/DL (ref 31.5–36.5)
MCV RBC AUTO: 95 FL (ref 78–100)
PLATELET # BLD AUTO: 226 10E3/UL (ref 150–450)
POTASSIUM BLD-SCNC: 4.3 MMOL/L (ref 3.4–5.3)
RBC # BLD AUTO: 3.87 10E6/UL (ref 3.8–5.2)
SODIUM SERPL-SCNC: 139 MMOL/L (ref 133–144)
WBC # BLD AUTO: 9.9 10E3/UL (ref 4–11)

## 2022-10-05 PROCEDURE — 258N000003 HC RX IP 258 OP 636: Performed by: PHYSICIAN ASSISTANT

## 2022-10-05 PROCEDURE — 96376 TX/PRO/DX INJ SAME DRUG ADON: CPT

## 2022-10-05 PROCEDURE — 250N000011 HC RX IP 250 OP 636: Performed by: ANESTHESIOLOGY

## 2022-10-05 PROCEDURE — 85027 COMPLETE CBC AUTOMATED: CPT | Performed by: PHYSICIAN ASSISTANT

## 2022-10-05 PROCEDURE — 2894A VOIDCORRECT: CPT | Mod: 59 | Performed by: NEUROLOGICAL SURGERY

## 2022-10-05 PROCEDURE — 250N000011 HC RX IP 250 OP 636: Performed by: PHYSICIAN ASSISTANT

## 2022-10-05 PROCEDURE — 360N000084 HC SURGERY LEVEL 4 W/ FLUORO, PER MIN: Performed by: NEUROLOGICAL SURGERY

## 2022-10-05 PROCEDURE — 83735 ASSAY OF MAGNESIUM: CPT | Performed by: PHYSICIAN ASSISTANT

## 2022-10-05 PROCEDURE — 63030 LAMOT DCMPRN NRV RT 1 LMBR: CPT | Mod: LT | Performed by: NEUROLOGICAL SURGERY

## 2022-10-05 PROCEDURE — 258N000003 HC RX IP 258 OP 636: Performed by: ANESTHESIOLOGY

## 2022-10-05 PROCEDURE — 250N000025 HC SEVOFLURANE, PER MIN: Performed by: NEUROLOGICAL SURGERY

## 2022-10-05 PROCEDURE — 710N000009 HC RECOVERY PHASE 1, LEVEL 1, PER MIN: Performed by: NEUROLOGICAL SURGERY

## 2022-10-05 PROCEDURE — 99225 PR SUBSEQUENT OBSERVATION CARE,LEVEL II: CPT | Performed by: PHYSICIAN ASSISTANT

## 2022-10-05 PROCEDURE — 36415 COLL VENOUS BLD VENIPUNCTURE: CPT | Performed by: PHYSICIAN ASSISTANT

## 2022-10-05 PROCEDURE — 250N000013 HC RX MED GY IP 250 OP 250 PS 637: Performed by: PHYSICIAN ASSISTANT

## 2022-10-05 PROCEDURE — G0378 HOSPITAL OBSERVATION PER HR: HCPCS

## 2022-10-05 PROCEDURE — 80048 BASIC METABOLIC PNL TOTAL CA: CPT | Performed by: PHYSICIAN ASSISTANT

## 2022-10-05 PROCEDURE — 96361 HYDRATE IV INFUSION ADD-ON: CPT

## 2022-10-05 PROCEDURE — 99207 PR NO CHARGE LOS: CPT | Performed by: PHYSICIAN ASSISTANT

## 2022-10-05 PROCEDURE — 272N000001 HC OR GENERAL SUPPLY STERILE: Performed by: NEUROLOGICAL SURGERY

## 2022-10-05 PROCEDURE — 999N000141 HC STATISTIC PRE-PROCEDURE NURSING ASSESSMENT: Performed by: NEUROLOGICAL SURGERY

## 2022-10-05 PROCEDURE — 999N000180 XR SURGERY CARM FLUORO LESS THAN 5 MIN

## 2022-10-05 PROCEDURE — 93005 ELECTROCARDIOGRAM TRACING: CPT | Mod: XU

## 2022-10-05 PROCEDURE — 250N000009 HC RX 250: Performed by: ANESTHESIOLOGY

## 2022-10-05 PROCEDURE — 82962 GLUCOSE BLOOD TEST: CPT

## 2022-10-05 PROCEDURE — 250N000009 HC RX 250: Performed by: NEUROLOGICAL SURGERY

## 2022-10-05 PROCEDURE — 63030 LAMOT DCMPRN NRV RT 1 LMBR: CPT | Mod: AS | Performed by: PHYSICIAN ASSISTANT

## 2022-10-05 PROCEDURE — 370N000017 HC ANESTHESIA TECHNICAL FEE, PER MIN: Performed by: NEUROLOGICAL SURGERY

## 2022-10-05 RX ORDER — BISACODYL 10 MG
10 SUPPOSITORY, RECTAL RECTAL DAILY PRN
Status: DISCONTINUED | OUTPATIENT
Start: 2022-10-05 | End: 2022-10-06 | Stop reason: HOSPADM

## 2022-10-05 RX ORDER — CLINDAMYCIN PHOSPHATE 900 MG/50ML
900 INJECTION, SOLUTION INTRAVENOUS SEE ADMIN INSTRUCTIONS
Status: DISCONTINUED | OUTPATIENT
Start: 2022-10-05 | End: 2022-10-05

## 2022-10-05 RX ORDER — OXYCODONE HYDROCHLORIDE 5 MG/1
10 TABLET ORAL EVERY 4 HOURS PRN
Status: DISCONTINUED | OUTPATIENT
Start: 2022-10-05 | End: 2022-10-06 | Stop reason: HOSPADM

## 2022-10-05 RX ORDER — HYDROMORPHONE HCL IN WATER/PF 6 MG/30 ML
0.2 PATIENT CONTROLLED ANALGESIA SYRINGE INTRAVENOUS EVERY 5 MIN PRN
Status: DISCONTINUED | OUTPATIENT
Start: 2022-10-05 | End: 2022-10-05 | Stop reason: HOSPADM

## 2022-10-05 RX ORDER — ONDANSETRON 4 MG/1
4 TABLET, ORALLY DISINTEGRATING ORAL EVERY 30 MIN PRN
Status: DISCONTINUED | OUTPATIENT
Start: 2022-10-05 | End: 2022-10-05 | Stop reason: HOSPADM

## 2022-10-05 RX ORDER — LIDOCAINE 40 MG/G
CREAM TOPICAL
Status: DISCONTINUED | OUTPATIENT
Start: 2022-10-05 | End: 2022-10-05

## 2022-10-05 RX ORDER — SODIUM CHLORIDE, SODIUM LACTATE, POTASSIUM CHLORIDE, CALCIUM CHLORIDE 600; 310; 30; 20 MG/100ML; MG/100ML; MG/100ML; MG/100ML
INJECTION, SOLUTION INTRAVENOUS CONTINUOUS
Status: DISCONTINUED | OUTPATIENT
Start: 2022-10-05 | End: 2022-10-05 | Stop reason: HOSPADM

## 2022-10-05 RX ORDER — ONDANSETRON 2 MG/ML
4 INJECTION INTRAMUSCULAR; INTRAVENOUS EVERY 6 HOURS PRN
Status: DISCONTINUED | OUTPATIENT
Start: 2022-10-05 | End: 2022-10-05

## 2022-10-05 RX ORDER — ONDANSETRON 2 MG/ML
INJECTION INTRAMUSCULAR; INTRAVENOUS PRN
Status: DISCONTINUED | OUTPATIENT
Start: 2022-10-05 | End: 2022-10-05

## 2022-10-05 RX ORDER — OXYCODONE HYDROCHLORIDE 5 MG/1
5 TABLET ORAL EVERY 4 HOURS PRN
Status: DISCONTINUED | OUTPATIENT
Start: 2022-10-05 | End: 2022-10-06 | Stop reason: HOSPADM

## 2022-10-05 RX ORDER — NEOSTIGMINE METHYLSULFATE 1 MG/ML
VIAL (ML) INJECTION PRN
Status: DISCONTINUED | OUTPATIENT
Start: 2022-10-05 | End: 2022-10-05

## 2022-10-05 RX ORDER — FENTANYL CITRATE 0.05 MG/ML
25 INJECTION, SOLUTION INTRAMUSCULAR; INTRAVENOUS EVERY 5 MIN PRN
Status: DISCONTINUED | OUTPATIENT
Start: 2022-10-05 | End: 2022-10-05 | Stop reason: HOSPADM

## 2022-10-05 RX ORDER — GLYCOPYRROLATE 0.2 MG/ML
INJECTION, SOLUTION INTRAMUSCULAR; INTRAVENOUS PRN
Status: DISCONTINUED | OUTPATIENT
Start: 2022-10-05 | End: 2022-10-05

## 2022-10-05 RX ORDER — ONDANSETRON 2 MG/ML
4 INJECTION INTRAMUSCULAR; INTRAVENOUS EVERY 30 MIN PRN
Status: DISCONTINUED | OUTPATIENT
Start: 2022-10-05 | End: 2022-10-05 | Stop reason: HOSPADM

## 2022-10-05 RX ORDER — CALCIUM CARBONATE 500 MG/1
500 TABLET, CHEWABLE ORAL 4 TIMES DAILY PRN
Status: DISCONTINUED | OUTPATIENT
Start: 2022-10-05 | End: 2022-10-06 | Stop reason: HOSPADM

## 2022-10-05 RX ORDER — BUPIVACAINE HYDROCHLORIDE AND EPINEPHRINE 2.5; 5 MG/ML; UG/ML
INJECTION, SOLUTION EPIDURAL; INFILTRATION; INTRACAUDAL; PERINEURAL PRN
Status: DISCONTINUED | OUTPATIENT
Start: 2022-10-05 | End: 2022-10-05 | Stop reason: HOSPADM

## 2022-10-05 RX ORDER — METHOCARBAMOL 750 MG/1
750 TABLET, FILM COATED ORAL EVERY 6 HOURS PRN
Status: DISCONTINUED | OUTPATIENT
Start: 2022-10-05 | End: 2022-10-06 | Stop reason: HOSPADM

## 2022-10-05 RX ORDER — PROPOFOL 10 MG/ML
INJECTION, EMULSION INTRAVENOUS PRN
Status: DISCONTINUED | OUTPATIENT
Start: 2022-10-05 | End: 2022-10-05

## 2022-10-05 RX ORDER — DIPHENHYDRAMINE HCL 25 MG
25 CAPSULE ORAL EVERY 6 HOURS PRN
Status: DISCONTINUED | OUTPATIENT
Start: 2022-10-05 | End: 2022-10-06 | Stop reason: HOSPADM

## 2022-10-05 RX ORDER — POLYETHYLENE GLYCOL 3350 17 G/17G
17 POWDER, FOR SOLUTION ORAL DAILY
Status: DISCONTINUED | OUTPATIENT
Start: 2022-10-06 | End: 2022-10-06 | Stop reason: HOSPADM

## 2022-10-05 RX ORDER — HYDROMORPHONE HCL IN WATER/PF 6 MG/30 ML
0.4 PATIENT CONTROLLED ANALGESIA SYRINGE INTRAVENOUS
Status: DISCONTINUED | OUTPATIENT
Start: 2022-10-05 | End: 2022-10-06 | Stop reason: HOSPADM

## 2022-10-05 RX ORDER — PROCHLORPERAZINE MALEATE 10 MG
10 TABLET ORAL EVERY 6 HOURS PRN
Status: DISCONTINUED | OUTPATIENT
Start: 2022-10-05 | End: 2022-10-06 | Stop reason: HOSPADM

## 2022-10-05 RX ORDER — ACETAMINOPHEN 325 MG/1
650 TABLET ORAL EVERY 4 HOURS PRN
Status: DISCONTINUED | OUTPATIENT
Start: 2022-10-08 | End: 2022-10-06 | Stop reason: HOSPADM

## 2022-10-05 RX ORDER — CLINDAMYCIN PHOSPHATE 900 MG/50ML
900 INJECTION, SOLUTION INTRAVENOUS
Status: COMPLETED | OUTPATIENT
Start: 2022-10-05 | End: 2022-10-05

## 2022-10-05 RX ORDER — CLINDAMYCIN PHOSPHATE 900 MG/50ML
900 INJECTION, SOLUTION INTRAVENOUS
Status: DISCONTINUED | OUTPATIENT
Start: 2022-10-05 | End: 2022-10-05

## 2022-10-05 RX ORDER — FENTANYL CITRATE 50 UG/ML
50 INJECTION, SOLUTION INTRAMUSCULAR; INTRAVENOUS ONCE
Status: COMPLETED | OUTPATIENT
Start: 2022-10-05 | End: 2022-10-05

## 2022-10-05 RX ORDER — DIPHENHYDRAMINE HYDROCHLORIDE 50 MG/ML
25 INJECTION INTRAMUSCULAR; INTRAVENOUS EVERY 6 HOURS PRN
Status: DISCONTINUED | OUTPATIENT
Start: 2022-10-05 | End: 2022-10-06 | Stop reason: HOSPADM

## 2022-10-05 RX ORDER — AMOXICILLIN 250 MG
1 CAPSULE ORAL 2 TIMES DAILY
Status: DISCONTINUED | OUTPATIENT
Start: 2022-10-05 | End: 2022-10-06 | Stop reason: HOSPADM

## 2022-10-05 RX ORDER — CLINDAMYCIN PHOSPHATE 900 MG/50ML
900 INJECTION, SOLUTION INTRAVENOUS SEE ADMIN INSTRUCTIONS
Status: DISCONTINUED | OUTPATIENT
Start: 2022-10-05 | End: 2022-10-05 | Stop reason: HOSPADM

## 2022-10-05 RX ORDER — LIDOCAINE HYDROCHLORIDE 20 MG/ML
INJECTION, SOLUTION INFILTRATION; PERINEURAL PRN
Status: DISCONTINUED | OUTPATIENT
Start: 2022-10-05 | End: 2022-10-05

## 2022-10-05 RX ORDER — HYDROMORPHONE HCL IN WATER/PF 6 MG/30 ML
0.2 PATIENT CONTROLLED ANALGESIA SYRINGE INTRAVENOUS
Status: DISCONTINUED | OUTPATIENT
Start: 2022-10-05 | End: 2022-10-06 | Stop reason: HOSPADM

## 2022-10-05 RX ORDER — ONDANSETRON 4 MG/1
4 TABLET, ORALLY DISINTEGRATING ORAL EVERY 6 HOURS PRN
Status: DISCONTINUED | OUTPATIENT
Start: 2022-10-05 | End: 2022-10-05

## 2022-10-05 RX ORDER — ACETAMINOPHEN 325 MG/1
975 TABLET ORAL EVERY 8 HOURS SCHEDULED
Status: DISCONTINUED | OUTPATIENT
Start: 2022-10-05 | End: 2022-10-06 | Stop reason: HOSPADM

## 2022-10-05 RX ADMIN — HYDROMORPHONE HYDROCHLORIDE 0.2 MG: 0.2 INJECTION, SOLUTION INTRAMUSCULAR; INTRAVENOUS; SUBCUTANEOUS at 16:44

## 2022-10-05 RX ADMIN — HYDROMORPHONE HYDROCHLORIDE 0.5 MG: 1 INJECTION, SOLUTION INTRAMUSCULAR; INTRAVENOUS; SUBCUTANEOUS at 08:13

## 2022-10-05 RX ADMIN — LORATADINE 10 MG: 10 TABLET ORAL at 08:17

## 2022-10-05 RX ADMIN — CYCLOBENZAPRINE 10 MG: 10 TABLET, FILM COATED ORAL at 21:49

## 2022-10-05 RX ADMIN — HYDROMORPHONE HYDROCHLORIDE 0.2 MG: 0.2 INJECTION, SOLUTION INTRAMUSCULAR; INTRAVENOUS; SUBCUTANEOUS at 16:13

## 2022-10-05 RX ADMIN — ACETAMINOPHEN 975 MG: 325 TABLET, FILM COATED ORAL at 00:13

## 2022-10-05 RX ADMIN — NEOSTIGMINE METHYLSULFATE 3.5 MG: 1 INJECTION, SOLUTION INTRAVENOUS at 15:40

## 2022-10-05 RX ADMIN — HYDROMORPHONE HYDROCHLORIDE 0.5 MG: 1 INJECTION, SOLUTION INTRAMUSCULAR; INTRAVENOUS; SUBCUTANEOUS at 04:51

## 2022-10-05 RX ADMIN — GABAPENTIN 300 MG: 300 CAPSULE ORAL at 08:17

## 2022-10-05 RX ADMIN — HYDROMORPHONE HYDROCHLORIDE 0.2 MG: 0.2 INJECTION, SOLUTION INTRAMUSCULAR; INTRAVENOUS; SUBCUTANEOUS at 17:09

## 2022-10-05 RX ADMIN — HYDROMORPHONE HYDROCHLORIDE 0.2 MG: 0.2 INJECTION, SOLUTION INTRAMUSCULAR; INTRAVENOUS; SUBCUTANEOUS at 20:39

## 2022-10-05 RX ADMIN — ROCURONIUM BROMIDE 50 MG: 50 INJECTION, SOLUTION INTRAVENOUS at 14:45

## 2022-10-05 RX ADMIN — CLINDAMYCIN PHOSPHATE 900 MG: 900 INJECTION, SOLUTION INTRAVENOUS at 12:53

## 2022-10-05 RX ADMIN — VENLAFAXINE HYDROCHLORIDE 150 MG: 75 CAPSULE, EXTENDED RELEASE ORAL at 08:17

## 2022-10-05 RX ADMIN — ROCURONIUM BROMIDE 20 MG: 50 INJECTION, SOLUTION INTRAVENOUS at 15:15

## 2022-10-05 RX ADMIN — PROPOFOL 180 MG: 10 INJECTION, EMULSION INTRAVENOUS at 14:45

## 2022-10-05 RX ADMIN — ONDANSETRON 4 MG: 2 INJECTION INTRAMUSCULAR; INTRAVENOUS at 15:29

## 2022-10-05 RX ADMIN — GABAPENTIN 300 MG: 300 CAPSULE ORAL at 21:49

## 2022-10-05 RX ADMIN — SODIUM CHLORIDE, POTASSIUM CHLORIDE, SODIUM LACTATE AND CALCIUM CHLORIDE: 600; 310; 30; 20 INJECTION, SOLUTION INTRAVENOUS at 13:56

## 2022-10-05 RX ADMIN — ACETAMINOPHEN 975 MG: 325 TABLET, FILM COATED ORAL at 08:17

## 2022-10-05 RX ADMIN — SODIUM CHLORIDE: 9 INJECTION, SOLUTION INTRAVENOUS at 06:44

## 2022-10-05 RX ADMIN — MIDAZOLAM 2 MG: 1 INJECTION INTRAMUSCULAR; INTRAVENOUS at 14:39

## 2022-10-05 RX ADMIN — TRAZODONE HYDROCHLORIDE 50 MG: 50 TABLET ORAL at 00:13

## 2022-10-05 RX ADMIN — LIDOCAINE HYDROCHLORIDE 100 MG: 20 INJECTION, SOLUTION INFILTRATION; PERINEURAL at 14:45

## 2022-10-05 RX ADMIN — HYDROMORPHONE HYDROCHLORIDE 0.5 MG: 1 INJECTION, SOLUTION INTRAMUSCULAR; INTRAVENOUS; SUBCUTANEOUS at 00:12

## 2022-10-05 RX ADMIN — DEXAMETHASONE SODIUM PHOSPHATE 10 MG: 4 INJECTION, SOLUTION INTRAMUSCULAR; INTRAVENOUS at 08:18

## 2022-10-05 RX ADMIN — ACETAMINOPHEN 975 MG: 325 TABLET, FILM COATED ORAL at 20:32

## 2022-10-05 RX ADMIN — SODIUM CHLORIDE: 9 INJECTION, SOLUTION INTRAVENOUS at 19:24

## 2022-10-05 RX ADMIN — CYCLOBENZAPRINE 10 MG: 10 TABLET, FILM COATED ORAL at 08:17

## 2022-10-05 RX ADMIN — FENTANYL CITRATE 50 MCG: 50 INJECTION, SOLUTION INTRAMUSCULAR; INTRAVENOUS at 13:52

## 2022-10-05 RX ADMIN — GLYCOPYRROLATE 0.6 MG: 0.2 INJECTION, SOLUTION INTRAMUSCULAR; INTRAVENOUS at 15:40

## 2022-10-05 RX ADMIN — SODIUM CHLORIDE, POTASSIUM CHLORIDE, SODIUM LACTATE AND CALCIUM CHLORIDE: 600; 310; 30; 20 INJECTION, SOLUTION INTRAVENOUS at 17:58

## 2022-10-05 RX ADMIN — HYDROMORPHONE HYDROCHLORIDE 0.5 MG: 1 INJECTION, SOLUTION INTRAMUSCULAR; INTRAVENOUS; SUBCUTANEOUS at 14:45

## 2022-10-05 RX ADMIN — LIDOCAINE 1 PATCH: 560 PATCH PERCUTANEOUS; TOPICAL; TRANSDERMAL at 20:34

## 2022-10-05 RX ADMIN — HYDROMORPHONE HYDROCHLORIDE 0.5 MG: 1 INJECTION, SOLUTION INTRAMUSCULAR; INTRAVENOUS; SUBCUTANEOUS at 11:04

## 2022-10-05 RX ADMIN — HYDROMORPHONE HYDROCHLORIDE 0.2 MG: 0.2 INJECTION, SOLUTION INTRAMUSCULAR; INTRAVENOUS; SUBCUTANEOUS at 16:29

## 2022-10-05 RX ADMIN — SENNOSIDES AND DOCUSATE SODIUM 1 TABLET: 50; 8.6 TABLET ORAL at 20:32

## 2022-10-05 ASSESSMENT — ACTIVITIES OF DAILY LIVING (ADL)
ADLS_ACUITY_SCORE: 39
ADLS_ACUITY_SCORE: 37
ADLS_ACUITY_SCORE: 39
ADLS_ACUITY_SCORE: 39
ADLS_ACUITY_SCORE: 37
ADLS_ACUITY_SCORE: 39
ADLS_ACUITY_SCORE: 37
ADLS_ACUITY_SCORE: 39

## 2022-10-05 ASSESSMENT — COPD QUESTIONNAIRES: COPD: 0

## 2022-10-05 ASSESSMENT — LIFESTYLE VARIABLES: TOBACCO_USE: 0

## 2022-10-05 NOTE — PROGRESS NOTES
Pt is A/O x4. VSS on RA. A/1 GB/W. NPO after midnight for possible surgery. Pain managed with PRN dilaudid and toradol. LLE weakness and numbness. IV running NS 75 ml/hr. Cont B/B. Denies N/V, SOB.

## 2022-10-05 NOTE — OR NURSING
10 minutes after giving the fentanyl this RN noticed that pt's face is flushed.  Pt denies itching. No SOB. Dr. Pace aware.  Will continue to monitor.

## 2022-10-05 NOTE — PROVIDER NOTIFICATION
Paged hospitalist for trazodone order per request because they take it at home.  PRN Trazodone is ordered

## 2022-10-05 NOTE — PROGRESS NOTES
"[facility]    Neurosurgery  Daily Note    Assessment & Plan   Jennifer Reyes is a 52 year old female with prior history of L4-S1 anterior lumbar interbody fusion with Dr. Novoa in 2013 who was admitted on 10/4/2022 with LLE pain, weakness and imaging evidence of left paracentral disc herniation at L3-4 contributing to lateral recess narrowing and foraminal stenosis. Patient evaluated by Dr. Solis 10/4 who recommended left L3-4 MIS microdisectomy and reviewed procedure in detail, risks, typical pre and post op expectations. Patient in agreement. OR likely at 130PM today, all orders are in.     Patient notes similar distribution of pain described as \"burning, achy\" down left hip, anterolateral thigh, anterior shin stopping at top of foot with associated numbness anterior and lateral shin. Notes similar weakness in LLE. Weakness noted with left hip flexion, PF, DF.    Plan:  -OR this afternoon for left L3-4 MIS microdisectomy. Pre op orders are in.   -Continue supportive and symptomatic treatment  -Start or continue physical therapy  -Pain control measures  -Plans reviewed with patient and Dr. Will Montiel PA-C  [facility] Neurosurgery  [facility]  [address] [place] [address]    Tel [phone]  Pager [phone]    Active Problems:    Lumbar disc herniation with myelopathy      Mirtha Montiel PA-C    Interval History   Stable     Physical Exam   Temp: 97.8  F (36.6  C) Temp src: Oral BP: 116/67 Pulse: 68   Resp: 20 SpO2: 99 % O2 Device: None (Room air)    There were no vitals filed for this visit.  Vital Signs with Ranges  Temp:  [97.7  F (36.5  C)-98.6  F (37  C)] 97.8  F (36.6  C)  Pulse:  [67-84] 68  Resp:  [14-20] 20  BP: (102-130)/(59-81) 116/67  SpO2:  [95 %-99 %] 99 %  No intake/output data recorded.    Awake, alert, appropriate  5/5motor strength RLE   3/5 left hip flexion,  5/5 knee flexion and extension,  3+/5 PF, 3/5 " DF  Decreased sensation left anterior and lateral shin  Negative clonus     Medications     sodium chloride 75 mL/hr at 10/05/22 0644        acetaminophen  975 mg Oral Q8H     cyclobenzaprine  10 mg Oral TID     dexamethasone  10 mg Intravenous Daily     gabapentin  300 mg Oral TID     lidocaine  1 patch Transdermal Q24h    And     lidocaine   Transdermal Q8H GEOVANNA     loratadine  10 mg Oral Daily     sodium chloride (PF)  3 mL Intracatheter Q8H     venlafaxine  150 mg Oral Daily       Plans discussed with Dr. Solis who was in agreement with plans    Mirtha HARP [facility]surgery  [address] [address]    Tel [phone]  Pager [phone]

## 2022-10-05 NOTE — PROGRESS NOTES
Post op note    POD0 s/p Left Lumbar 3 to Lumbar 4 minimally invasive microdiscectomy with Dr Solis. EBL 10ml.     Of note, had weakness LLE pre op     PLAN:   -will spend the night   -pain management   -up as tolerated  -incision closed with exophin (glue), no dressing needed  -discharge tomorrow if meeting all criteria (pain managed, no incision concerns, no concerns from PT standpoint)    -plans reviewed with Dr. Will Montiel PA-C  [facility]surgery  [address] [address]    Tel [phone]  Pager [phone]

## 2022-10-05 NOTE — BRIEF OP NOTE
[facility]    Brief Operative Note    Pre-operative diagnosis: Herniation of lumbar intervertebral disc with radiculopathy [M51.16]  Post-operative diagnosis Same as pre-operative diagnosis    Procedure: Procedure(s):  Left Lumbar 3 to Lumbar 4 minimally invasive microdiscectomy  Surgeon: Surgeon(s) and Role:     * Ricki Solis MD - Primary     * Mirtha Montiel PA-C - Assisting  Anesthesia: General   Estimated Blood Loss: 10ml    Drains: None  Specimens: * No specimens in log *  Findings:   None.  Complications: None.  Implants: * No implants in log *    [id number]

## 2022-10-05 NOTE — PROGRESS NOTES
[facility]    Medicine Progress Note - Hospitalist Service    Date of Admission:  10/4/2022    Assessment & Plan        Jennifer Reyes is a 52 year old female who was admitted on 10/4/2022.      Past medical history significant for known lumbar (L3-L4) disc herniation, Chronic low back pain, Idiopathic Cardiomyopathy, MDD with anxiety, Persistent insomnia, Seasonal allergies, Rhinoconjunctivits, History of Chemical dependency who was directly admitted to [facility] due to L3-L4 HNP.     Patient presented to [facility] ED on 10/3/2022 due to worsening low back pain with weakness.  Per ED note, ~ 16-17 days prior to presentation she developed abrupt lower lumbar spinal pain after bending over and lifting up a log.  She presented the following day to the ED and was diagnosed with L3-L4 herniation.  Since then, she was seen by pain specialist and underwent a local injection.  In the past 3 days, prior to presentation she developed increasing/worsening left lower extremity weakness and feels as though she has been developing a foot drop.  She also has noticed some numbness over her left calf and knee.  Patient described her pain as severe and worsened by certain positioning.       Work-up in the ED included a CMP that revealed a calcium level of 8.4, total protein of 6.5 otherwise within normal limits.  CBC with differential was unremarkable.  COVID-19 PCR negative.  Head CT was negative.  Head/Neck CTA were normal. Lumbar spine MRI with and w/o contract s/p anterior spinal fusion at the L4-L5 and L5-S1 disc space levels with no evidence of canal compromise neural foraminal narrowing and stable rim enhancing probable free fragment in left paracentral region inferior to the L3-L4 disc space most likely leading to displacement of exiting left L4 nerve root and left lateral recess narrowing.  Neurosurgery on-call was contacted and recommended decadron 10 mg IV and initially  recommended discharge with outpatient follow-up but due to severe pain requiring IV pain medications she was transferred to [facility] for ongoing pain management and Neurosurgery assessment.       L3-L4 HNP  Known L3-L4 disc herniation  Chronic low back pain  - IV decadron 10 mg/d.    - Neurosurgery consult completed, input appreciated.  - Patient going to the OR this afternoon for left L3-4 MIS microdiscectomy.  - Preop EKG ordered and reviewed.  NSR with no concerning findings.  - Currently n.p.o., resume diet after surgery  - Analgesic management:  Fentanyl patch was placed in the ED.              --APAP 975 mg every 8 hours.                 --PRN IV Dilaudid 0.5 mg every 3 hours.                 --PRN PO Oxycodone 5 mg every 4 hours.                 --Flexeril 10 mg TID.               --Lidoderm patch.      - Resumed on PTA gabapentin 300 mg/d.    - Hold PTA Voltaren 75 mg/d.       MDD with anxiety  - Resumed on PTA Effexor  mg/d.       Persistent insomnia   - Resumed on PTA PRN trazodone  mg at bedtime.       Seasonal allergies  Rhinoconjunctivits  - Hold PTA Singulair 10 mg at bedtime and olopatadine eye drops.    - Resumed on PTA Claritin 10 mg/d.     History of Chemical dependency (Opiods, treated in 2015)  - Analgesic management      History of idiopathic CM  Last ECHO with normalized EF.  History of abnormal ECHO with EF of 45% in 2010 after presenting with chest pain and noted elevated troponin.  Cardiac cath without obstructive disease.    - No interventions at this time.         Diet: NPO per Anesthesia Guidelines for Procedure/Surgery Except for: Meds    DVT Prophylaxis: Pneumatic Compression Devices  Miles Catheter: Not present  Central Lines: None  Cardiac Monitoring: None  Code Status: Full Code      Disposition Plan      Expected Discharge Date: 10/06/2022                The patient's care was discussed with the patient, and bedside nurse.  Patient was discussed with Dr. Ingram of the  hospitalist service.  She is in agreement with my assessment and plan of care.        ZEHRA Aguirre  Hospitalist Service  [facility]  Securely message with the [employer] Web Console (learn more here)  Text page via [facility] Paging/Directory         Clinically Significant Risk Factors Present on Admission                          ______________________________________________________________________    Interval History   Patient doing well, lying in bed on my arrival.  She reports continued numbness/tingling to the left thigh and calf down to the foot which has been present prior to presentation.  She denies any fever, chills, chest pain, shortness of breath, abdominal pain, N/V.  Aware for plan for surgery later today.    Data reviewed today: I reviewed all medications, new labs and imaging results over the last 24 hours. I personally reviewed EKG showing SR without any acute or concerning changes    Physical Exam   Vital Signs: Temp: 97.7  F (36.5  C) Temp src: Oral BP: 115/63 Pulse: 67   Resp: 14 SpO2: 96 % O2 Device: None (Room air)    Weight: 0 lbs 0 oz     Constitutional: Alert, oriented to person, place, situation.  Cooperative, lying in bed in NAD.   Respiratory:  Lungs CTAB.  No crackles, wheezes, or rhonchi, no labored breathing.  Cardiovascular:  Heart RRR, no murmur, no edema.  GI:  Abdomen soft, NT/ND and with normoactive BS  Skin/Integumen:  Warm, dry, non-diaphoretic.  MSK: Moves all 4 extremities.    Neuro: Decreased sensation to left anterior and lateral shin, present prior to admission.  No other overt deficits.      Data   Recent Labs   Lab 10/05/22  0859 10/03/22  1649   WBC 9.9 7.3   HGB 12.6 13.1   MCV 95 96    217    138   POTASSIUM 4.3 4.4   CHLORIDE 108 109   CO2 24 25   BUN 21 22   CR 0.59 0.69   ANIONGAP 7 4   ISAAC 8.9 8.4*   * 90   ALBUMIN  --  3.5   PROTTOTAL  --  6.5*   BILITOTAL  --  0.3   ALKPHOS  --  61   ALT  --  41   AST  --  27      No results found for this or any previous visit (from the past 24 hour(s)).  Medications     lactated ringers 25 mL/hr at 10/05/22 1356     sodium chloride Stopped (10/05/22 1200)       [Auto Hold] acetaminophen  975 mg Oral Q8H     clindamycin  900 mg Intravenous See Admin Instructions     [Auto Hold] cyclobenzaprine  10 mg Oral TID     [Auto Hold] dexamethasone  10 mg Intravenous Daily     [Auto Hold] gabapentin  300 mg Oral TID     [Auto Hold] lidocaine  1 patch Transdermal Q24h    And     [Auto Hold] lidocaine   Transdermal Q8H GEOVANNA     [Auto Hold] loratadine  10 mg Oral Daily     [Auto Hold] sodium chloride (PF)  3 mL Intracatheter Q8H     [Auto Hold] venlafaxine  150 mg Oral Daily

## 2022-10-05 NOTE — ANESTHESIA PROCEDURE NOTES
Airway       Patient location during procedure: OR       Procedure Start/Stop Times: 10/5/2022 2:48 PM  Staff -        Anesthesiologist:  Bernardo Pace MD       CRNA: Layla Mejia APRN CRNA       Performed By: CRNA  Consent for Airway        Urgency: elective  Indications and Patient Condition       Indications for airway management: rey-procedural       Induction type:intravenous      Final Airway Details       Final airway type: endotracheal airway       Successful airway: ETT - single  Endotracheal Airway Details        ETT size (mm): 7.0       Cuffed: yes       Successful intubation technique: direct laryngoscopy       DL Blade Type: MAC 3       Grade View of Cords: 1       Adjucts: stylet       Position: Right       Measured from: lips       Secured at (cm): 21       Bite block used: None    Post intubation assessment        Placement verified by: capnometry, equal breath sounds and chest rise        Number of attempts at approach: 1       Number of other approaches attempted: 0       Secured with: pink tape       Ease of procedure: easy       Dentition: Intact and Unchanged    Medication(s) Administered   Medication Administration Time: 10/5/2022 2:48 PM

## 2022-10-05 NOTE — ANESTHESIA POSTPROCEDURE EVALUATION
Patient: Jennifer Reyes    Procedure: Procedure(s):  Left Lumbar 3 to Lumbar 4 minimally invasive microdiscectomy       Anesthesia Type:  General    Note:  Disposition: Inpatient   Postop Pain Control: Uneventful            Sign Out: Well controlled pain   PONV: No   Neuro/Psych: Uneventful            Sign Out: Acceptable/Baseline neuro status   Airway/Respiratory: Uneventful            Sign Out: Acceptable/Baseline resp. status   CV/Hemodynamics: Uneventful            Sign Out: Acceptable CV status   Other NRE: NONE   DID A NON-ROUTINE EVENT OCCUR? No           Last vitals:  Vitals Value Taken Time   /64 10/05/22 1630   Temp 36.7  C (98  F) 10/05/22 1600   Pulse 75 10/05/22 1640   Resp 23 10/05/22 1640   SpO2 100 % 10/05/22 1640   Vitals shown include unvalidated device data.    Electronically Signed By: Bernardo Pace MD  October 5, 2022  4:41 PM

## 2022-10-05 NOTE — ANESTHESIA PREPROCEDURE EVALUATION
Anesthesia Pre-Procedure Evaluation    Patient: Jennifer Reyes   MRN: [id number] : 1970        Procedure : Procedure(s):  Left Lumbar 3 to Lumbar 4 minimally invasive microdiscectomy          Past Medical History:   Diagnosis Date     Anemia, unspecified      Anxiety state, unspecified      Apnea      Contact dermatitis and other eczema due to drugs and medicines in contact with skin 2007    Allergic reaction to lavendar aroma oil.     Contact dermatitis and other eczema due to plants (except food)      Depressive disorder, not elsewhere classified     had since 15, well controlled     Depressive disorder, not elsewhere classified 08    Suicidal ideation - [facility] admission     Displacement of lumbar intervertebral disc without myelopathy     L4-5     Duodenitis with hemorrhage 08     Erythema multiforme 2005    With secondary cellulitis, poison ivy rxn.  [facility] admit.     Lumbago 02/20/10    Transfer to [facility]     Myocardial infarction (H)     idiopathic cardiomyopathy     Other pulmonary insufficiency, not elsewhere classified     Vocal cord dysfunction     Poisoning and toxic reactions caused by other plants      Pulmonary insufficiency following trauma and surgery 06    Admit.Discharged 06     Syncope 7/7/10    Elevated troponin, transfer to [facility]     Syncope and collapse     due to hypotension      Past Surgical History:   Procedure Laterality Date     COLONOSCOPY  10/2020    Large sessile serrated adenoma, repeat 3 years     HC DEBRIDE SKIN/SUBQ TISSUE  2009    Post-op wound     HC INJ EPIDURAL LUMBAR/SACRAL W/WO CONTRAST      Left L4-5     HC LAP,FULGURATE/EXCISE LESIONS  2007    [place], [place]  [facility] [place] REMOVAL OF TONSILS,<13 Y/O  Age 7 or 8     HC TOOTH EXTRACTION W/FORCEP  Age 17    [place] teeth extraction x4.     HYSTERECTOMY      approx  - ovaries still in, unsure about cervix. no  cancer. had fibroids.     HYSTERECTOMY, PAP NO LONGER INDICATED       LAMINECT/DISCECTOMY, LUMBAR  10/24/2009     TUBAL LIGATION  02/27/2007    [facility]    L4 and L5 to S1     [facility] UGI ENDOSCOPY, SIMPLE EXAM  11/08/2008      Allergies   Allergen Reactions     Banana Hives     Same with kiwi     Cats      Morphine Hives     Peanuts [Nuts]      Patient states she is allergic to all tree nuts.     Rocephin [Ceftriaxone]       Social History     Tobacco Use     Smoking status: Never Smoker     Smokeless tobacco: Never Used   Substance Use Topics     Alcohol use: Yes     Comment: socially      Wt Readings from Last 1 Encounters:   10/03/22 70.3 kg (155 lb)        Anesthesia Evaluation   Pt has had prior anesthetic. Type: General.    History of anesthetic complications   syncope.    ROS/MED HX  ENT/Pulmonary:     (+) allergic rhinitis,  (-) tobacco use, asthma and COPD   Neurologic:  - neg neurologic ROS     Cardiovascular: Comment: Hx of cardiomyopathy that resolved    (+) -----fainting (syncope).  (-) hypertension, CAD and murmur   METS/Exercise Tolerance:     Hematologic:  - neg hematologic  ROS     Musculoskeletal:       GI/Hepatic:    (-) GERD and liver disease   Renal/Genitourinary:    (-) renal disease   Endo:    (-) Type I DM and Type II DM   Psychiatric/Substance Use:     (+) psychiatric history anxiety and depression     Infectious Disease:       Malignancy:       Other:            Physical Exam    Airway        Mallampati: II   TM distance: > 3 FB   Neck ROM: full   Mouth opening: > 3 cm    Respiratory Devices and Support         Dental  no notable dental history     (+) caps      Cardiovascular          Rhythm and rate: regular and normal (-) no murmur    Pulmonary           breath sounds clear to auscultation           OUTSIDE LABS:  CBC:   Lab Results   Component Value Date    WBC 9.9 10/05/2022    WBC 7.3 10/03/2022    HGB 12.6 10/05/2022    HGB 13.1  10/03/2022    HCT 36.8 10/05/2022    HCT 38.4 10/03/2022     10/05/2022     10/03/2022     BMP:   Lab Results   Component Value Date     10/05/2022     10/03/2022    POTASSIUM 4.3 10/05/2022    POTASSIUM 4.4 10/03/2022    CHLORIDE 108 10/05/2022    CHLORIDE 109 10/03/2022    CO2 24 10/05/2022    CO2 25 10/03/2022    BUN 21 10/05/2022    BUN 22 10/03/2022    CR 0.59 10/05/2022    CR 0.69 10/03/2022     (H) 10/05/2022    GLC 90 10/03/2022     COAGS:   Lab Results   Component Value Date    PTT 27 07/08/2010    INR 1.11 07/08/2010     POC:   Lab Results   Component Value Date     (H) 07/08/2010    HCG Negative 04/06/2015    HCGS Negative 07/08/2010     HEPATIC:   Lab Results   Component Value Date    ALBUMIN 3.5 10/03/2022    PROTTOTAL 6.5 (L) 10/03/2022    ALT 41 10/03/2022    AST 27 10/03/2022    ALKPHOS 61 10/03/2022    BILITOTAL 0.3 10/03/2022    JJ 17 03/28/2011     OTHER:   Lab Results   Component Value Date    PH 7.42 07/08/2010    A1C 5.6 05/29/2006    ISAAC 8.9 10/05/2022    PHOS 3.8 10/17/2006    MAG 2.2 10/05/2022    LIPASE 269 06/24/2018    AMYLASE 35 03/28/2011    TSH 1.40 04/07/2015    T4 0.81 12/07/2006    CRP 4.9 06/24/2018    SED 6 03/09/2011       Anesthesia Plan    ASA Status:  2   NPO Status:  NPO Appropriate    Anesthesia Type: General.     - Airway: ETT   Induction: Intravenous.   Maintenance: Balanced.        Consents    Anesthesia Plan(s) and associated risks, benefits, and realistic alternatives discussed. Questions answered and patient/representative(s) expressed understanding.    - Discussed:     - Discussed with:  Patient      - Extended Intubation/Ventilatory Support Discussed: No.      - Patient is DNR/DNI Status: No    Use of blood products discussed: No .     Postoperative Care    Pain management: IV analgesics.     - Plan for long acting post-op opioid use   PONV prophylaxis: Ondansetron (or other 5HT-3), Dexamethasone or Solumedrol      Comments:                Bernardo Pace MD

## 2022-10-05 NOTE — OP NOTE
Procedure Date: 10/05/2022    PREOPERATIVE DIAGNOSIS:  Left L3-L4 herniated disk with radiculopathy.    POSTOPERATIVE DIAGNOSIS:  Left L3-L4 herniated disk with radiculopathy.    PROCEDURE:  Left L3-L4 minimally invasive microdiskectomy.    SURGEON:  Ricki Solis MD    ASSISTANT:  Mirtha Montiel PA-C    ANESTHESIA:  General endotracheal anesthesia.    ESTIMATED BLOOD LOSS:  10 mL    INDICATIONS FOR PROCEDURE:  The patient is a 52-year-old female with a history of previous L4-S1 fusion, who was lifting and noticed a pop and pain down the left lower extremity.  This has been unrelenting without relief with medications or epidural steroid injection.  The patient also noted weakness in the quadriceps.  Given this, the patient was brought to the operating room for microdiskectomy. Please note that Wilton Easton PA-C's assistance was needed for positioning, retraction, suctioning, and closure.     DESCRIPTION OF PROCEDURE:  The patient was brought to the operating room.  General endotracheal anesthesia was induced.  The patient was rolled in the prone position on the Yon frame.  The back was prepped and draped in sterile fashion.  C-arm fluoroscopy was used to localize the appropriate level, and a left sided approach was performed using the METRx tubular dilating system.  The level was again confirmed with fluoroscopy.  The microscope was sterilely draped and brought in the field.  A high-speed drill was used to perform a laminotomy and medial facetectomy and then the ligamentum flavum underneath was identified and elevated and resected.  The nerve root was then retracted medially and herniated disk fragments were noted underneath the nerve root as it went around the pedicle as expected on the MRI.  The multiple disk fragments were removed and the nerve was felt to be well decompressed both out around the pedicle laterally and superiorly up to the disk space.  Once this was done and hemostasis was achieved, fascia  So James Kim ordered the MRI to look for a fracture     Wouldn't this be reason to proceed with the MRI? (please send back to Allison/Gumaro's basket) was closed with 0 Vicryl, 2-0 inverted interrupted Vicryl for the subcutaneous layer, 4-0 subcuticular Monocryl was used for skin and Exofin placed as a dressing.  The patient was awakened, extubated, and taken to the recovery room in good condition.    Ricki Solis MD        D: 10/05/2022   T: 10/05/2022   MT: BELÉN    Name:     TAMICA RITTER  MRN:      [id number]        Account:        [id number]   :      1970           Procedure Date: 10/05/2022     Document: [id number]

## 2022-10-05 NOTE — PROGRESS NOTES
Alert and oriented x4. VSS. On RA denies SOB. Tolerating regular diet. Pain in lower back and left leg was managed with PRN dilaudid. Voiding adequately. NPO at midnight for possible surgery tomorrow. Assist of one with GB/W.

## 2022-10-05 NOTE — UTILIZATION REVIEW
"Admission Status; Secondary Review Determination     Under the authority of the Utilization Management Committee, the utilization review process indicated a secondary review on the above patient.  The review outcome is based on review of the medical records, discussions with staff, and applying clinical experience noted on the date of the review.       (x) Observation Status Appropriate - This patient does not meet hospital inpatient criteria and is placed in observation status. If this patient's primary payer is Medicare and was admitted as an inpatient, Condition Code 44 should be used and patient status changed to \"observation\".     RATIONALE FOR DETERMINATION:  52-year-old female admitted 10/4/2022 due to significant back pain radicular symptoms associated with new progressive left lower extremity weakness.  Observation care appropriate for initial pain control awaiting surgical intervention, expected microdiscectomy.    The severity of illness, intensity of service provided, expected LOS and risk for adverse outcome make the care appropriate for further observation; however, doesn't meet criteria for hospital inpatient admission. This was discussed with attending physician who concurred with this determination.    The information on this document is developed by the utilization review team in order for the business office to ensure compliance.  This only denotes the appropriateness of proper admission status and does not reflect the quality of care rendered.         The definitions of Inpatient Status and Observation Status used in making the determination above are those provided in the CMS Coverage Manual, Chapter 1 and Chapter 6, section 70.4.      Sincerely,     Kavon Caba MD    Physician Advisor  Utilization Review/ Case Management  [facility].    "

## 2022-10-05 NOTE — ANESTHESIA CARE TRANSFER NOTE
Patient: Jennifer Reyes    Procedure: Procedure(s):  Left Lumbar 3 to Lumbar 4 minimally invasive microdiscectomy       Diagnosis: Herniation of lumbar intervertebral disc with radiculopathy [M51.16]  Diagnosis Additional Information: No value filed.    Anesthesia Type:   General     Note:    Oropharynx: oropharynx clear of all foreign objects and spontaneously breathing  Level of Consciousness: drowsy  Oxygen Supplementation: face mask  Level of Supplemental Oxygen (L/min / FiO2): 8  Independent Airway: airway patency satisfactory and stable  Dentition: dentition unchanged  Vital Signs Stable: post-procedure vital signs reviewed and stable  Report to RN Given: handoff report given  Patient transferred to: PACU  Comments: Patient breathing spontaneously.  Follows commands.  Suctioned and extubated.  Exchanging air well.  Transferred to PACU with 8L O2 via mask.  Monitors on.  VSS.  Patent IV.  Report and transfer of care to RN.    Handoff Report: Identifed the Patient, Identified the Reponsible Provider, Reviewed the pertinent medical history, Discussed the surgical course, Reviewed Intra-OP anesthesia mangement and issues during anesthesia, Set expectations for post-procedure period and Allowed opportunity for questions and acknowledgement of understanding      Vitals:  Vitals Value Taken Time   /70 10/05/22 1600   Temp 36.7  C (98  F) 10/05/22 1600   Pulse 98 10/05/22 1607   Resp 17 10/05/22 1607   SpO2 100 % 10/05/22 1607   Vitals shown include unvalidated device data.    Electronically Signed By: ADRYL Grimm CRNA  October 5, 2022  4:08 PM

## 2022-10-06 ENCOUNTER — APPOINTMENT (OUTPATIENT)
Dept: PHYSICAL THERAPY | Facility: CLINIC | Age: 52
End: 2022-10-06
Attending: PHYSICIAN ASSISTANT
Payer: COMMERCIAL

## 2022-10-06 VITALS
HEART RATE: 72 BPM | WEIGHT: 163.36 LBS | DIASTOLIC BLOOD PRESSURE: 60 MMHG | HEIGHT: 72 IN | TEMPERATURE: 97.9 F | RESPIRATION RATE: 16 BRPM | OXYGEN SATURATION: 96 % | SYSTOLIC BLOOD PRESSURE: 105 MMHG | BODY MASS INDEX: 22.13 KG/M2

## 2022-10-06 LAB
MAGNESIUM SERPL-MCNC: 2 MG/DL (ref 1.6–2.3)
POTASSIUM BLD-SCNC: 3.6 MMOL/L (ref 3.4–5.3)

## 2022-10-06 PROCEDURE — 97530 THERAPEUTIC ACTIVITIES: CPT | Mod: GP | Performed by: PHYSICAL THERAPIST

## 2022-10-06 PROCEDURE — 97162 PT EVAL MOD COMPLEX 30 MIN: CPT | Mod: GP | Performed by: PHYSICAL THERAPIST

## 2022-10-06 PROCEDURE — 96361 HYDRATE IV INFUSION ADD-ON: CPT

## 2022-10-06 PROCEDURE — G0378 HOSPITAL OBSERVATION PER HR: HCPCS

## 2022-10-06 PROCEDURE — 250N000013 HC RX MED GY IP 250 OP 250 PS 637: Performed by: PHYSICIAN ASSISTANT

## 2022-10-06 PROCEDURE — 84132 ASSAY OF SERUM POTASSIUM: CPT | Performed by: PHYSICIAN ASSISTANT

## 2022-10-06 PROCEDURE — 83735 ASSAY OF MAGNESIUM: CPT | Performed by: PHYSICIAN ASSISTANT

## 2022-10-06 PROCEDURE — 97116 GAIT TRAINING THERAPY: CPT | Mod: GP | Performed by: PHYSICAL THERAPIST

## 2022-10-06 PROCEDURE — 36415 COLL VENOUS BLD VENIPUNCTURE: CPT | Performed by: PHYSICIAN ASSISTANT

## 2022-10-06 PROCEDURE — 99217 PR OBSERVATION CARE DISCHARGE: CPT | Performed by: PHYSICIAN ASSISTANT

## 2022-10-06 PROCEDURE — 250N000011 HC RX IP 250 OP 636: Performed by: PHYSICIAN ASSISTANT

## 2022-10-06 PROCEDURE — 96376 TX/PRO/DX INJ SAME DRUG ADON: CPT

## 2022-10-06 PROCEDURE — 258N000003 HC RX IP 258 OP 636: Performed by: PHYSICIAN ASSISTANT

## 2022-10-06 RX ORDER — OXYCODONE HYDROCHLORIDE 5 MG/1
5-10 TABLET ORAL EVERY 4 HOURS PRN
Qty: 40 TABLET | Refills: 0 | Status: SHIPPED | OUTPATIENT
Start: 2022-10-06 | End: 2022-10-12

## 2022-10-06 RX ORDER — METHOCARBAMOL 750 MG/1
750 TABLET, FILM COATED ORAL EVERY 6 HOURS PRN
Qty: 40 TABLET | Refills: 1 | Status: SHIPPED | OUTPATIENT
Start: 2022-10-06 | End: 2022-10-17

## 2022-10-06 RX ORDER — AMOXICILLIN 250 MG
1 CAPSULE ORAL DAILY PRN
Qty: 30 TABLET | Refills: 1 | Status: ON HOLD | OUTPATIENT
Start: 2022-10-06 | End: 2022-10-25

## 2022-10-06 RX ADMIN — ACETAMINOPHEN 975 MG: 325 TABLET, FILM COATED ORAL at 05:38

## 2022-10-06 RX ADMIN — VENLAFAXINE HYDROCHLORIDE 150 MG: 75 CAPSULE, EXTENDED RELEASE ORAL at 08:14

## 2022-10-06 RX ADMIN — POLYETHYLENE GLYCOL 3350 17 G: 17 POWDER, FOR SOLUTION ORAL at 08:14

## 2022-10-06 RX ADMIN — GABAPENTIN 300 MG: 300 CAPSULE ORAL at 08:14

## 2022-10-06 RX ADMIN — CYCLOBENZAPRINE 10 MG: 10 TABLET, FILM COATED ORAL at 08:14

## 2022-10-06 RX ADMIN — TRAZODONE HYDROCHLORIDE 50 MG: 50 TABLET ORAL at 00:04

## 2022-10-06 RX ADMIN — LORATADINE 10 MG: 10 TABLET ORAL at 08:15

## 2022-10-06 RX ADMIN — OXYCODONE HYDROCHLORIDE 10 MG: 5 TABLET ORAL at 01:27

## 2022-10-06 RX ADMIN — SENNOSIDES AND DOCUSATE SODIUM 1 TABLET: 50; 8.6 TABLET ORAL at 08:14

## 2022-10-06 RX ADMIN — SODIUM CHLORIDE: 9 INJECTION, SOLUTION INTRAVENOUS at 04:41

## 2022-10-06 RX ADMIN — HYDROMORPHONE HYDROCHLORIDE 0.4 MG: 0.2 INJECTION, SOLUTION INTRAMUSCULAR; INTRAVENOUS; SUBCUTANEOUS at 00:05

## 2022-10-06 RX ADMIN — DEXAMETHASONE SODIUM PHOSPHATE 10 MG: 4 INJECTION, SOLUTION INTRAMUSCULAR; INTRAVENOUS at 08:15

## 2022-10-06 RX ADMIN — OXYCODONE HYDROCHLORIDE 10 MG: 5 TABLET ORAL at 05:39

## 2022-10-06 RX ADMIN — OXYCODONE HYDROCHLORIDE 10 MG: 5 TABLET ORAL at 13:05

## 2022-10-06 ASSESSMENT — ACTIVITIES OF DAILY LIVING (ADL)
ADLS_ACUITY_SCORE: 37
ADLS_ACUITY_SCORE: 37
ADLS_ACUITY_SCORE: 33
ADLS_ACUITY_SCORE: 33
ADLS_ACUITY_SCORE: 37
ADLS_ACUITY_SCORE: 33
ADLS_ACUITY_SCORE: 37

## 2022-10-06 ASSESSMENT — COLUMBIA-SUICIDE SEVERITY RATING SCALE - C-SSRS
4. HAVE YOU HAD THESE THOUGHTS AND HAD SOME INTENTION OF ACTING ON THEM?: NO
6. HAVE YOU EVER DONE ANYTHING, STARTED TO DO ANYTHING, OR PREPARED TO DO ANYTHING TO END YOUR LIFE?: NO
1. IN THE PAST MONTH, HAVE YOU WISHED YOU WERE DEAD OR WISHED YOU COULD GO TO SLEEP AND NOT WAKE UP?: NO
3. HAVE YOU BEEN THINKING ABOUT HOW YOU MIGHT KILL YOURSELF?: NO
2. HAVE YOU ACTUALLY HAD ANY THOUGHTS OF KILLING YOURSELF IN THE PAST MONTH?: NO
5. HAVE YOU STARTED TO WORK OUT OR WORKED OUT THE DETAILS OF HOW TO KILL YOURSELF? DO YOU INTEND TO CARRY OUT THIS PLAN?: NO

## 2022-10-06 NOTE — PROGRESS NOTES
10/06/22 0840   Quick Adds   Type of Visit Initial PT Evaluation       Present no   Language English   Living Environment   People in Home spouse;child(tiana), adult   Current Living Arrangements house   Home Accessibility stairs to enter home;stairs within home   Number of Stairs, Main Entrance 4   Stair Railings, Main Entrance railings safe and in good condition   Number of Stairs, Within Home, Primary seven   Stair Railings, Within Home, Primary railings safe and in good condition   Transportation Anticipated family or friend will provide   Self-Care   Usual Activity Tolerance excellent   Current Activity Tolerance moderate   Regular Exercise Yes   Activity/Exercise Type walking;strength training;running/jogging   Exercise Amount/Frequency 3-5 times/wk   Equipment Currently Used at Home none   Fall history within last six months no   General Information   Onset of Illness/Injury or Date of Surgery 10/04/22   Referring Physician Mirtha Montiel PA-C   Patient/Family Therapy Goals Statement (PT) Feel better, go home   Pertinent History of Current Problem (include personal factors and/or comorbidities that impact the POC) Pt is a 52 year old female who was admitted on 10/4/2022,  s/p Left Lumbar 3 to Lumbar 4 minimally invasive microdiscectomy with Dr Solis    Past medical history significant for known lumbar (L3-L4) disc herniation, Chronic low back pain, Idiopathic Cardiomyopathy, MDD with anxiety, Persistent insomnia, Seasonal allergies, Rhinoconjunctivits, History of Chemical dependency who was directly admitted to [facility] due to L3-L4 HNP.   Existing Precautions/Restrictions spinal   Cognition   Affect/Mental Status (Cognition) anxious;WFL   Orientation Status (Cognition) oriented x 4   Follows Commands (Cognition) WNL   Pain Assessment   Patient Currently in Pain Yes, see Vital Sign flowsheet  (3/10 supine, up to 5-6/10 with ambulation)   Integumentary/Edema    Integumentary/Edema no deficits were identifed   Posture    Posture Forward head position;Protracted shoulders   Range of Motion (ROM)   Range of Motion ROM deficits secondary to surgical procedure;ROM deficits secondary to pain   Strength (Manual Muscle Testing)   Strength (Manual Muscle Testing) Able to perform L SLR;Able to perform R SLR;Deficits observed during functional mobility   Strength Comments Unable to formally test L LE 2/2 pain, but grossly 4/5 L LE, 5/5 R LE;   Bed Mobility   Bed Mobility supine-sit   Comment, (Bed Mobility) SBA   Transfers   Comment, (Transfers) SBA   Gait/Stairs (Locomotion)   Comment, (Gait/Stairs) CG-Jimmy w/noAD, SBA w/2WW   Balance   Balance Comments Benefits from B UE support 2/2 pain and decr activity tolerance s/p spine surgery   Sensory Examination   Sensory Perception Comments Decreased sensation left anterior and lateral shin   Coordination   Coordination no deficits were identified   Muscle Tone   Muscle Tone no deficits were identified   Clinical Impression   Criteria for Skilled Therapeutic Intervention Yes, treatment indicated   PT Diagnosis (PT) Impaired indep w/fn mobility   Influenced by the following impairments Pain, strength, balance, ROM   Functional limitations due to impairments Bed mob, transfers, amb, stairs   Clinical Presentation (PT Evaluation Complexity) Evolving/Changing   Clinical Presentation Rationale Multiple affecting comorbidities, assessment incl strength, balance, fn mob.   Clinical Decision Making (Complexity) moderate complexity   Planned Therapy Interventions (PT) gait training;home exercise program;neuromuscular re-education;postural re-education;strengthening;transfer training   Anticipated Equipment Needs at Discharge (PT) walker, rolling   Risk & Benefits of therapy have been explained evaluation/treatment results reviewed;care plan/treatment goals reviewed;participants voiced agreement with care plan;current/potential barriers  reviewed;risks/benefits reviewed;participants included;patient   PT Discharge Planning   PT Discharge Recommendation (DC Rec) home with assist;home with outpatient physical therapy;home   PT Rationale for DC Rec Pt should do well with post-op cares. She completed all fn mobility w/no overt LOB, SBA w/2WW. Gait speed slow ~0.3m/s w/walker, and in high 5/10 pain throughout. No knee buckling noted, pt exhibits good safety awraeness, and familiarity with spine prec from previous spine surgery. Anticipate she can d/c home safely with intermittent assistance from spouse/dtr. Incr time spent discussing home ergonomic setup and reducing lumbar strain, pt works long hours as  with office desk poorly configured and puts her at risk for incr low back pain. OPPT recommended to maximize fn outcomes and reduce risk of re-injury and re-hospitalization.    PT Brief overview of current status SBA w/2WW   Plan of Care Review   Plan of Care Reviewed With parent   Total Evaluation Time   Total Evaluation Time (Minutes) 10   Physical Therapy Goals   PT Frequency One time eval and treatment only   PT Predicted Duration/Target Date for Goal Attainment 10/06/22   PT Goals Bed Mobility;Transfers;Gait;Stairs;PT Goal 1   PT: Bed Mobility Supervision/stand-by assist   PT: Transfers Supervision/stand-by assist   PT: Gait Supervision/stand-by assist   PT: Stairs Supervision/stand-by assist   PT: Goal 1 Pt will be able to verbalize and complete HEP consisting of daily walking program.   Psychosocial Support   Trust Relationship/Rapport care explained;choices provided;questions answered;questions encouraged;reassurance provided

## 2022-10-06 NOTE — DISCHARGE SUMMARY
[facility]    Discharge Summary  Hospitalist    Date of Admission:  10/4/2022  Date of Discharge:  10/6/2022  1:18 PM  Discharging Provider: ZEHRA Aguirre  Date of Service (when I saw the patient): 10/06/22    Discharge Diagnoses     L3-L4 HNP, S/p left L3-4 MIS microdiscectomy 10/5/2022.    Known L3-L4 disc herniation    Chronic low back pain    Chronic diagnoses:    MDD with anxiety    Persistent insomnia     Seasonal allergies    Rhinoconjunctivits    History of Chemical dependency (Opioids, treated in 2015)    History of idiopathic CM    History of Present Illness   Jennifer Reyes is a very pleasant 52 year old female who was admitted on 10/4/2022.      Past medical history significant for known lumbar (L3-L4) disc herniation, Chronic low back pain, Idiopathic Cardiomyopathy, MDD with anxiety, Persistent insomnia, Seasonal allergies, Rhinoconjunctivits, History of Chemical dependency who was directly admitted to [facility] due to L3-L4 HNP.     Patient presented to [facility] ED on 10/3/2022 due to worsening low back pain with weakness.  Per ED note, ~ 16-17 days prior to presentation she developed abrupt lower lumbar spinal pain after bending over and lifting up a log.  She presented the following day to the ED and was diagnosed with L3-L4 herniation.  Since then, she was seen by pain specialist and underwent a local injection.  In the past 3 days, prior to presentation she developed increasing/worsening left lower extremity weakness and feels as though she has been developing a foot drop.  She also has noticed some numbness over her left calf and knee.  Patient described her pain as severe and worsened by certain positioning.       Work-up in the ED included a CMP that revealed a calcium level of 8.4, total protein of 6.5 otherwise within normal limits.  CBC with differential was unremarkable.  COVID-19 PCR negative.  Head CT was negative.  Head/Neck CTA were  normal. Lumbar spine MRI with and w/o contract s/p anterior spinal fusion at the L4-L5 and L5-S1 disc space levels with no evidence of canal compromise neural foraminal narrowing and stable rim enhancing probable free fragment in left paracentral region inferior to the L3-L4 disc space most likely leading to displacement of exiting left L4 nerve root and left lateral recess narrowing.  Neurosurgery on-call was contacted and recommended decadron 10 mg IV and initially recommended discharge with outpatient follow-up but due to severe pain requiring IV pain medications she was transferred to [facility] for ongoing pain management and Neurosurgery assessment.      Hospital Course     L3-L4 HNP, S/p left L3-4 MIS microdiscectomy 10/5/2022.  Known L3-L4 disc herniation  Chronic low back pain  - IV decadron 10 mg/d provided while hospitalized.    - Neurosurgery consult completed, input appreciated.  - S/p left L3-4 MIS microdiscectomy 10/5/2022.  - Analgesic management while in hospital:  Fentanyl patch was placed in the ED.               --APAP 975 mg every 8 hours.                 --PRN IV Dilaudid 0.5 mg every 3 hours.                 --PRN PO Oxycodone 5 mg every 4 hours.                 --Flexeril 10 mg TID.               --Lidoderm patch.      ---Patient discharging on oxycodone, Tylenol, Robaxin as needed per neurosurgery.  See medications ordered by neurosurgery service below.  - Continue PTA gabapentin 300 mg/d.    - PTA Voltaren 75 mg/d was held, and resumed at discharge per neurosurgery.    - Patient progressing well postoperatively.  Pain controlled.  Tolerating diet.  Ambulating with standby assist.  PT recommends that she can return home with intermittent assistance from family.  OP PT recommended.  Neurosurgery has seen the patient and entered orders for discharge.     MDD with anxiety  - Resume PTA Effexor  mg/d.       Persistent insomnia   - Resume PTA PRN trazodone  mg at  bedtime.       Seasonal allergies  Rhinoconjunctivits  - Resume PTA Singulair 10 mg at bedtime, Claritin 10 mg daily, and olopatadine eye drops.       History of Chemical dependency (Opiods, treated in 2015)  - Analgesic management      History of idiopathic CM  Last ECHO with normalized EF.  History of abnormal ECHO with EF of 45% in 2010 after presenting with chest pain and noted elevated troponin.  Cardiac cath without obstructive disease.    - No interventions at this time.        Reggie Graves PA-C    This patient was discussed with Dr. Elisa Ingram of the [facility] service.  She is in agreement with my assessment and plan of care.    Significant Results and Procedures   S/p left L3-4 MIS microdiscectomy 10/5/2022.    Pending Results   None    Code Status   Full Code       Primary Care Physician   Marni Montemayor    Physical Exam   Temp: 97.9  F (36.6  C) Temp src: Oral BP: 105/60 Pulse: 72   Resp: 16 SpO2: 96 % O2 Device: None (Room air) Oxygen Delivery: 3 LPM  Vitals:    10/06/22 0500   Weight: 74.1 kg (163 lb 5.8 oz)     Vital Signs with Ranges  Temp:  [97.9  F (36.6  C)-98.9  F (37.2  C)] 97.9  F (36.6  C)  Pulse:  [] 72  Resp:  [8-26] 16  BP: (103-128)/(53-77) 105/60  SpO2:  [96 %-100 %] 96 %  I/O last 3 completed shifts:  In: 480 [P.O.:480]  Out: 1305 [Urine:1300; Blood:5]    Constitutional: Appears comfortable  HEENT: Sclera white, MMM  Respiratory: Breathing non-labored  Musculoskeletal: Normal muscle bulk and tone  Neuro: Alert and appropriate, MANRIQUEZ  Psych: Calm and cooperative    Discharge Disposition   Discharged to home  Condition at discharge: Stable    Consultations This Hospital Stay   NEUROSURGERY IP CONSULT  OCCUPATIONAL THERAPY ADULT IP CONSULT  PHYSICAL THERAPY ADULT IP CONSULT      Discharge Orders      Reason for your hospital stay    Back surgery     Follow-up and recommended labs and tests     2 week follow-up for incision check     Activity     Your activity upon discharge: activity as tolerated, no lifting more than 10 lbs     Wound care and dressings    Instructions to care for your wound at home: may get incision wet in shower but do not soak or scrub.     Discharge Instructions    Discharge instructions:  No lifting of more than 10 pounds until follow up visit.  Ok to shampoo hair, shower or bathe, but, do not scrub or submerge incision under water until evaluated post operatively in clinic.    Ok to walk as tolerated, avoid bedrest.    No contact sports until after follow up visit  No high impact activities such as; running/jogging, snowmobile or 4 mckeon riding or any other recreational vehicles    Call my office at [phone] for increasing redness, swelling or pus draining from the incision, increased pain or any other questions and concerns.    Go to ER with any seizure activity, mental status change (increasing confusion), difficulty with speech or increasing or acute weakness     Diet    Follow this diet upon discharge: Advance to a regular diet as tolerated     Discharge Medications   Discharge Medication List as of 10/6/2022 12:55 PM      START taking these medications    Details   methocarbamol (ROBAXIN) 750 MG tablet Take 1 tablet (750 mg) by mouth every 6 hours as needed for muscle spasms, Disp-40 tablet, R-1, E-Prescribe      oxyCODONE (ROXICODONE) 5 MG tablet Take 1-2 tablets (5-10 mg) by mouth every 4 hours as needed for moderate to severe pain, Disp-40 tablet, R-0, E-Prescribe      senna-docusate (SENOKOT-S/PERICOLACE) 8.6-50 MG tablet Take 1 tablet by mouth daily as needed for constipation, Disp-30 tablet, R-1, E-Prescribe         CONTINUE these medications which have NOT CHANGED    Details   acetaminophen (TYLENOL) 500 MG tablet Take 500 mg by mouth every 6 hours as needed for mild pain, Historical      azelastine (ASTELIN) 0.1 % nasal spray Spray 2 sprays into both nostrils 2 times daily, Disp-30 mL, R-11, E-PrescribeDon't fill  now, will call if needed.      cyclobenzaprine (FLEXERIL) 10 MG tablet Take 10 mg by mouth 3 times daily as needed for muscle spasms, Historical      diclofenac (VOLTAREN) 75 MG EC tablet Take 75 mg by mouth 2 times daily, Historical      EPINEPHrine (ANY BX GENERIC EQUIV) 0.3 MG/0.3ML injection 2-pack Inject 0.3 mLs (0.3 mg) into the muscle as needed for anaphylaxis, Disp-0.6 mL, R-1, E-Prescribe      fluticasone (FLONASE) 50 MCG/ACT nasal spray Spray 2 sprays into both nostrils At Bedtime, Disp-48 g, R-3, E-PrescribeDon't fill now, will call if needed.      gabapentin (NEURONTIN) 300 MG capsule Take 300 mg by mouth 3 times daily, Historical      loratadine (CLARITIN) 10 MG tablet Take 10 mg by mouth daily, Historical      montelukast (SINGULAIR) 10 MG tablet Take 1 tablet (10 mg) by mouth At Bedtime, Disp-30 tablet, R-5, E-Prescribe      olopatadine (PATANOL) 0.1 % ophthalmic solution Apply 1 drop to eye 2 times daily as needed Takes seasonally only, Historical      traZODone (DESYREL) 100 MG tablet TAKE 1/2 TO 1 TAB BY MOUTH NIGHTLY AS NEEDED FOR SLEEP, Disp-90 tablet, R-1, E-PrescribeDon't fill now, will call if needed.      venlafaxine (EFFEXOR XR) 150 MG 24 hr capsule Take 1 capsule (150 mg) by mouth daily, Disp-90 capsule, R-3, E-Prescribe      vitamin D3 (CHOLECALCIFEROL) 50 mcg (2000 units) tablet Take 1 tablet (50 mcg) by mouth daily, OTC         STOP taking these medications       HYDROcodone-acetaminophen (NORCO) 5-325 MG tablet Comments:   Reason for Stopping:         naproxen sodium (ANAPROX) 220 MG tablet Comments:   Reason for Stopping:             Allergies   Allergies   Allergen Reactions     Banana Hives     Same with kiwi     Cats      Morphine Hives     Peanuts [Nuts]      Patient states she is allergic to all tree nuts.     Rocephin [Ceftriaxone]      Data   Most Recent 3 CBC's:  Recent Labs   Lab Test 10/05/22  0859 10/03/22  1649 06/24/18  2309   WBC 9.9 7.3 7.6   HGB 12.6 13.1 13.9   MCV 95  96 99    217 191      Most Recent 3 BMP's:  Recent Labs   Lab Test 10/06/22  0802 10/05/22  2221 10/05/22  0859 10/03/22  1649 06/24/18  2309   NA  --   --  139 138 142   POTASSIUM 3.6  --  4.3 4.4 3.8   CHLORIDE  --   --  108 109 108   CO2  --   --  24 25 26   BUN  --   --  21 22 14   CR  --   --  0.59 0.69 0.74   ANIONGAP  --   --  7 4 8   ISAAC  --   --  8.9 8.4* 8.4*   GLC  --  110* 102* 90 79     Most Recent 2 LFT's:  Recent Labs   Lab Test 10/03/22  1649 06/24/18  2309   AST 27 21   ALT 41 24   ALKPHOS 61 48   BILITOTAL 0.3 0.5     Most Recent INR's and Anticoagulation Dosing History:  Anticoagulation Dose History     Recent Dosing and Labs Latest Ref Rng & Units 7/22/2005 9/30/2006 10/24/2009 1/7/2010 7/8/2010    INR 0.86 - 1.14 1.10 1.14 0.97 1.05 1.11        Most Recent 3 Troponin's:No lab results found.  Most Recent Cholesterol Panel:No lab results found.  Most Recent 6 Bacteria Isolates From Any Culture (See EPIC Reports for Culture Details):  Recent Labs   Lab Test 06/24/18  2232   CULT No growth     Most Recent TSH, T4 and A1c Labs:  Recent Labs   Lab Test 04/07/15  0708   TSH 1.40     Results for orders placed or performed during the hospital encounter of 10/04/22   XR Surgery MIR L/T 5 Min Fluoro    Narrative    This exam was marked as non-reportable because it will not be read by a   radiologist or a [facility] non-radiologist provider.

## 2022-10-06 NOTE — PLAN OF CARE
[facility]      OUTPATIENT PHYSICAL THERAPY EVALUATION  PLAN OF TREATMENT FOR OUTPATIENT REHABILITATION  (COMPLETE FOR INITIAL CLAIMS ONLY)  Patient's Last Name, First Name, M.I.  YOB: 1970  Jennifer Reyes                        Provider's Name  [facility] Medical Record No.  [id number]                               Onset Date:  10/04/22   Start of Care Date:    10/6/2022     Type:     _X_PT   ___OT   ___SLP Medical Diagnosis:   s/p lumbar sx                        PT Diagnosis:  Impaired indep w/fn mobility   Visits from SOC:  1   _________________________________________________________________________________  Plan of Treatment/Functional Goals    Planned Interventions: gait training, home exercise program, neuromuscular re-education, postural re-education, strengthening, transfer training     Goals: See Physical Therapy Goals on Care Plan in [employer] electronic health record.    Therapy Frequency: One time eval and treatment only  Predicted Duration of Therapy Intervention: 10/06/22  _________________________________________________________________________________    I CERTIFY THE NEED FOR THESE SERVICES FURNISHED UNDER        THIS PLAN OF TREATMENT AND WHILE UNDER MY CARE     (Physician co-signature of this document indicates review and certification of the therapy plan).               ,      Referring Physician: Mirtha Montiel PA-C            Initial Assessment        See Physical Therapy evaluation dated   in Epic electronic health record.

## 2022-10-06 NOTE — PLAN OF CARE
Goal Outcome Evaluation:  Pt is discharging home today with family. Discharge medications reviewed with patient and follow up appointments, verbalized understanding. IV removed and patient belongings accounted for.

## 2022-10-06 NOTE — PLAN OF CARE
A&Ox4. VSS on RA. Denies N/V. Pain in back & radiates to LLE managed w/ PRN dilaudid. Shower done before surgery. POD0. Voided in PACU. Not passing flatus yet, but BS active. Drowsy but arouses to noise. CMS intact, ex numbness to LLE & weakness. IVF NS 75 ml/hr. Discharge pending improvement.

## 2022-10-06 NOTE — PLAN OF CARE
Physical Therapy Discharge Summary    Reason for therapy discharge:    Discharged to home with outpatient therapy.    Progress towards therapy goal(s). See goals on Care Plan in [facility] electronic health record for goal details.  Goals met    Therapy recommendation(s):    Pt should do well with post-op cares. She completed all fn mobility w/no overt LOB, SBA w/2WW. Gait speed slow ~0.3m/s w/walker, and in high 5/10 pain throughout. No knee buckling noted, pt exhibits good safety awraeness, and familiarity with spine prec from previous spine surgery. Anticipate she can d/c home safely with intermittent assistance from spouse/dtr. Incr time spent discussing home ergonomic setup and reducing lumbar strain, pt works long hours as  with office desk poorly configured and puts her at risk for incr low back pain. OPPT recommended to maximize fn outcomes and reduce risk of re-injury and re-hospitalization.

## 2022-10-06 NOTE — PROGRESS NOTES
[facility]    Neurosurgery  Daily Post-Op Note    Assessment & Plan   Procedure(s):  Left Lumbar 3 to Lumbar 4 minimally invasive microdiscectomy   1 Day Post-Op  Doing well.  Pain well-controlled. Leg pain resolved.     Plan:  -Advance activity as tolerated  -Continue supportive and symptomatic treatment  -Start or continue physical therapy  -ok to discharge home today. F/u orders in. Scripts sent.     Aroldo Fowler PA-C    Interval History   Stable.  Doing well.  Improving slowly.  Pain is reasonably controlled.  No fevers.     Physical Exam   Temp: 97.9  F (36.6  C) Temp src: Oral BP: 105/60 Pulse: 72   Resp: 16 SpO2: 96 % O2 Device: None (Room air) Oxygen Delivery: 3 LPM  Vitals:    10/06/22 0500   Weight: 74.1 kg (163 lb 5.8 oz)     Vital Signs with Ranges  Temp:  [97.9  F (36.6  C)-98.9  F (37.2  C)] 97.9  F (36.6  C)  Pulse:  [] 72  Resp:  [8-26] 16  BP: (103-129)/(53-84) 105/60  SpO2:  [96 %-100 %] 96 %  I/O last 3 completed shifts:  In: 480 [P.O.:480]  Out: 1305 [Urine:1300; Blood:5]    Alert and oriented.  Moves all extremities equally.      Incision: CDU      Medications     sodium chloride Stopped (10/06/22 1038)        acetaminophen  975 mg Oral Q8H GEOVANNA     cyclobenzaprine  10 mg Oral TID     dexamethasone  10 mg Intravenous Daily     gabapentin  300 mg Oral TID     lidocaine  1 patch Transdermal Q24h    And     lidocaine   Transdermal Q8H GEOVANNA     loratadine  10 mg Oral Daily     polyethylene glycol  17 g Oral Daily     senna-docusate  1 tablet Oral BID     sodium chloride (PF)  3 mL Intracatheter Q8H     venlafaxine  150 mg Oral Daily           Aroldo Fowler PA-C  [facility]surgery  [address]  [address]  [place] [address]    Tel [phone]  Pager [phone]

## 2022-10-06 NOTE — PROGRESS NOTES
Patient vital signs are at baseline: Yes  Patient able to ambulate as they were prior to admission or with assist devices provided by therapies during their stay:  Yes  Patient MUST void prior to discharge:  Yes  Patient able to tolerate oral intake:  Yes  Pain has adequate pain control using Oral analgesics:  Yes  Does patient have an identified :  Yes  Has goal D/C date and time been discussed with patient:  Yes, TBD    Pt is A&O x4. VSS. .9NS infusing at 75 ml/hr. Back incision is CDI. Back pain managed with Hydromorphone PO. Denies numbness or tingling, continues to have L leg weakness. Pt is voiding adequately and tolerated a regular diet. Pt needs standby assistance for ambulation with use of gait belt and the walker.

## 2022-10-07 LAB
ATRIAL RATE - MUSE: 71 BPM
DIASTOLIC BLOOD PRESSURE - MUSE: NORMAL MMHG
INTERPRETATION ECG - MUSE: NORMAL
P AXIS - MUSE: 74 DEGREES
PR INTERVAL - MUSE: 166 MS
QRS DURATION - MUSE: 86 MS
QT - MUSE: 432 MS
QTC - MUSE: 469 MS
R AXIS - MUSE: 94 DEGREES
SYSTOLIC BLOOD PRESSURE - MUSE: NORMAL MMHG
T AXIS - MUSE: 86 DEGREES
VENTRICULAR RATE- MUSE: 71 BPM

## 2022-10-12 DIAGNOSIS — Z98.890 S/P LUMBAR MICRODISCECTOMY: ICD-10-CM

## 2022-10-12 RX ORDER — OXYCODONE HYDROCHLORIDE 5 MG/1
5-10 TABLET ORAL EVERY 4 HOURS PRN
Qty: 40 TABLET | Refills: 0 | Status: SHIPPED | OUTPATIENT
Start: 2022-10-12 | End: 2022-10-17

## 2022-10-12 NOTE — TELEPHONE ENCOUNTER
Patient calling for a refill of oxycodone.     DOS: 10/5  Procedure: Left Lumbar 3 to Lumbar 4 minimally invasive microdiscectomy  Surgeon: Dr. Solis  Weeks Post Op: 1 week     Current symptom(s): Patient currently rating pain at a 4-5/10. Pain continues to be located in her left leg and incision. Denied any new n/t or weakness. Patient was concerned that the nerve pain in her L hip that she had pre-operatively is still present. Advised patient that nerve pain can take time to improve and to continue to monitor.   Current pain management: 2 tabs oxycodone Q4H, tylenol throughout the day, Robaxin in the AM, ice PRN    Last fill: 10/6 #40  Next visit: 10/19/22    Medication pended for your approval, if appropriate. Pharmacy verified.     Any patient questions or concerns: continued nerve pain in L help, reassured patient nerve pain takes time to heal     Informed patient request will be forwarded to care team.

## 2022-10-17 ENCOUNTER — TELEPHONE (OUTPATIENT)
Dept: NEUROSURGERY | Facility: CLINIC | Age: 52
End: 2022-10-17

## 2022-10-17 DIAGNOSIS — Z98.890 S/P LUMBAR MICRODISCECTOMY: ICD-10-CM

## 2022-10-17 RX ORDER — OXYCODONE HYDROCHLORIDE 5 MG/1
5-10 TABLET ORAL EVERY 4 HOURS PRN
Qty: 40 TABLET | Refills: 0 | Status: SHIPPED | OUTPATIENT
Start: 2022-10-17 | End: 2022-10-19

## 2022-10-17 RX ORDER — METHYLPREDNISOLONE 4 MG
TABLET, DOSE PACK ORAL
Qty: 21 TABLET | Refills: 0 | Status: SHIPPED | OUTPATIENT
Start: 2022-10-17 | End: 2022-10-23

## 2022-10-17 RX ORDER — METHOCARBAMOL 750 MG/1
750 TABLET, FILM COATED ORAL EVERY 6 HOURS PRN
Qty: 40 TABLET | Refills: 1 | Status: SHIPPED | OUTPATIENT
Start: 2022-10-17 | End: 2022-11-28

## 2022-10-17 NOTE — TELEPHONE ENCOUNTER
Mirtha Montiel PA-C reviewed below with Dr. Solis. They are recommending patient obtain a new Lumbar MRI and start a MDP.     Once patient completes her MRI she should follow up in clinic. Dr. Solis is OK to see her if she is willing to wait until he is back in clinic next week. Otherwise patient can follow up with Joe or Mirtha after MRI.     Oxycodone, Robaxin, and MDP sent to patients pharmacy. MRI ordered.     Called patient to provide update and scheduling numbers. Patient will call to schedule MRI and then schedule follow up with our clinic. Advised patient to keep our clinic updated and call with any new or worsening symptoms.

## 2022-10-17 NOTE — TELEPHONE ENCOUNTER
Last Visit:  S/p Left Lumbar 3 to Lumbar 4 minimally invasive microdiscectomy on 10/5     Next Visit: 10/19 with RN    Name of Provider:  Dr. Solis    Assessment: After surgery patients pain was well controlled, but about 4 days ago her left leg and left hip pain started to get worse.  She had nerve pain in her L hip prior to surgery, but now it is starting to go down her left leg. She is also experiencing tingling in her LLE. She also states the weakness she had prior to surgery feels like it is returning (Denied foot drop/drag). She states her pain is a 5/10 with oxycodone and an 8/10 without it. She stated she cannot deal with this pain much longer and wants to see Dr. Solis. She has been following her activity restrictions, denied any activity that could have caused the increased pain.  She is using 2 tabs oxycodone Q4H and is out of methocarbamol   Action needed from provider: Will route high priority to care team for review and recommendations. MDP,f/u in clinic?   Patient also requesting refills, medications have been pended

## 2022-10-17 NOTE — TELEPHONE ENCOUNTER
Patient called to advise she will be having her MRI on 10/21/22. If there are questions she advised to call her back at [phone]. Thank you~

## 2022-10-18 NOTE — TELEPHONE ENCOUNTER
Responded to patient via [employer]. Advised patient to schedule follow up appointment after MRI with Dr. Solis or one of his PA's.

## 2022-10-19 ENCOUNTER — OFFICE VISIT (OUTPATIENT)
Dept: NEUROSURGERY | Facility: CLINIC | Age: 52
End: 2022-10-19
Payer: COMMERCIAL

## 2022-10-19 ENCOUNTER — DOCUMENTATION ONLY (OUTPATIENT)
Dept: NEUROSURGERY | Facility: CLINIC | Age: 52
End: 2022-10-19

## 2022-10-19 ENCOUNTER — MYC MEDICAL ADVICE (OUTPATIENT)
Dept: NEUROSURGERY | Facility: CLINIC | Age: 52
End: 2022-10-19

## 2022-10-19 VITALS
SYSTOLIC BLOOD PRESSURE: 132 MMHG | HEIGHT: 70 IN | BODY MASS INDEX: 25.05 KG/M2 | DIASTOLIC BLOOD PRESSURE: 68 MMHG | OXYGEN SATURATION: 96 % | WEIGHT: 175 LBS | HEART RATE: 63 BPM

## 2022-10-19 DIAGNOSIS — Z98.890 S/P SPINAL SURGERY: Primary | ICD-10-CM

## 2022-10-19 DIAGNOSIS — Z98.890 S/P LUMBAR MICRODISCECTOMY: ICD-10-CM

## 2022-10-19 PROCEDURE — 99207 PR NO CHARGE NURSE ONLY: CPT

## 2022-10-19 RX ORDER — OXYCODONE HYDROCHLORIDE 5 MG/1
5-10 TABLET ORAL EVERY 4 HOURS PRN
Qty: 40 TABLET | Refills: 0 | Status: ON HOLD | OUTPATIENT
Start: 2022-10-21 | End: 2022-10-25

## 2022-10-19 RX ORDER — OXYCODONE HYDROCHLORIDE 5 MG/1
5-10 TABLET ORAL EVERY 4 HOURS PRN
Qty: 40 TABLET | Refills: 0 | Status: CANCELLED | OUTPATIENT
Start: 2022-10-21

## 2022-10-19 NOTE — TELEPHONE ENCOUNTER
Refill of oxycodone pended and routed to care team for review and approval. See 2 week RN visit for pain assessment.

## 2022-10-19 NOTE — NURSING NOTE
"Jennifer Reyes is a 52 year old female who presents for:  Chief Complaint   Patient presents with     RECHECK     Neck/ Shoulder pain MRI Results        Initial Vitals:  /68   Pulse 63   Ht 5' 10\" (1.778 m)   Wt 175 lb (79.4 kg)   LMP 02/21/2007 (Exact Date)   SpO2 96%   BMI 25.11 kg/m   Estimated body mass index is 25.11 kg/m  as calculated from the following:    Height as of this encounter: 5' 10\" (1.778 m).    Weight as of this encounter: 175 lb (79.4 kg).. Body surface area is 1.98 meters squared. BP completed using cuff size: regular  Data Unavailable        Cele Cali  "

## 2022-10-19 NOTE — PATIENT INSTRUCTIONS
Instructions for Patient    Incision   Keep your incision clean and dry at all times.   It is okay to shower, just pat the incision dry   No submerging incision in water such as pools, hot tubs, or baths for at least 8 weeks and until the incision is healed  Do not apply lotions or ointments to incision    Activity  No lifting greater than 10 pounds. Limited bending, twisting, or overhead reaching.  Walking is the best way to start exercise after surgery. Take short frequent walks. You may gradually increase the distance as tolerated. If you feel pain, decrease your activity, but DO NOT stop walking. Walking will help you gain strength, prevent muscle soreness and spasms, and prevent blood clots.   Avoid bed rest and prolonged sitting for longer than 30 minutes (change positions frequently while awake)  No contact sports or high impact activities such as; running/jogging, snowmobile or 4 mckeon riding or any other recreational vehicles until after given clearance at one of your follow up visits    Medications   Refills of pain medication:   Please call the neurosurgery clinic to request 2-3 days before you run out  Weaning from narcotic pain medications  When it is time, start weaning by extending the time between doses.   For example, if you're taking 2 tabs every 4 hours, spread it out to 2 tabs over 4.5, 5, 6 hours. At that point you can certainly cut down to 1 tab, then wean to an as needed basis until completely done with them.Refills: call our clinic 2-3 business days before you are out of medication. A nurse will call you back to obtain a pain assessment.   Don't take more than 3000mg of Acetaminophen in 24 hours  Ok to begin taking Aspirin and NSAIDs (ex: ibuprofen/Advil)  Encouraged icing for at least 3-4 times throughout the day for 20-30 minutes at a time. Avoid heat to the incision area.   Taking stool softeners regularly can reduce constipation commonly caused by narcotic pain medications.    Contact  clinic or Emergency Room if you develop:   Infection (redness, swelling, warmth, drainage, fever over 101 F)  New injury  Bladder or bowel changes or loss of control    Signs of blood clot:  Swelling and/or warmth in one or both legs  Pain or tenderness in your leg, ankle, foot, or arm   Red or discolored skin     Go to the Emergency Room   If sudden onset of severe headache, weakness, confusion, change in level of consciousness, pain, or loss of movement.  Chest pain  Trouble breathing     Post-operative appointments  10/25 - MRI  10/28 - Dr. Solis  11/16 - Melvin Miller PA-C  1/6/23 - Dr. Will [facility]  [place] [address] [place] [id number]  Telephone:  [phone]   Fax:  [phone]

## 2022-10-19 NOTE — PROGRESS NOTES
[employer] STD forms received for patient via [employer].    Family leave form received for patient's daughter via [employer].     In RN office at  for completion.

## 2022-10-19 NOTE — TELEPHONE ENCOUNTER
Patient sent incision photos and paperwork via [employer]. Incision photos reviewed by CHARISSE. No concerns.     Updated patient via [employer]

## 2022-10-19 NOTE — Clinical Note
Oxycodone refill pended for Friday. Let me know if any issues refilling from a cc'ed chart and I'll do a new encounter. I'm not sure how that works. Thanks!

## 2022-10-19 NOTE — PROGRESS NOTES
Post-op Nurse Visit - Telephone:    Patient presents via telephone today s/p Left Lumbar 3 to Lumbar 4 minimally invasive microdiscectomy on 10/5/22 with Ricki Solis MD    Pain/Neuro Assessment  6-7/10 to left hip and left leg described as nerve pain.   Numbness/tingling: left leg tingling   Strength: Weakness in left leg. Started about 5 days ago. Weakness is getting worse. Providers aware per previous nurse triage call. Patient is scheduled for MRI and follow-up with Dr. Solis. Reviewed red flag symptoms and reasons to seek emergency care. Instructions sent to patient via [employer] message.    Pain Relief Measures:  Oxycodone: 2 pills every 4 hours  Tylenol: None  Naproxen: Will begin taking  Robaxin: TID  MDP: First day was yesterday  Ice: PRN  Heat: PRN on left hip    Incision  Advised patient to sent image via [employer]. Per patient, no redness, drainage, or warmth noted. Patient states incision looks slightly swollen but looks good.     Activity  Following restrictions   Falls: none  Patient is walking occasionally  with some difficulty due to left leg weakness.   Legs examined by patient over the phone; no redness, swelling, or warmth noted per patient. Patient denies any pain in bilateral calves.     GI/  Difficulty swallowing? No  Patient's appetite is decreased. Patient reports she is eating soup due to decreased appetite. Advised patient she could incorporate protein drinks to ensure she is getting protein for healing.  Bowel/bladder problems? Yes - intermittent constipation.  Taking stool softeners? Yes - using miralax and occasional enema.    Refills/x-rays/return to work  Refills given at this appointment? Yes - oxycodone pended for Friday 10/21.  Sent for x-rays after this appointment? No  Ordered future x-rays? No  Return to work discussed at this appointment? Yes - Patient will send paperwork to be completed via [employer].    All of patient's questions addressed today. Patient was instructed to call  with any additional questions/concerns.

## 2022-10-20 ENCOUNTER — MYC MEDICAL ADVICE (OUTPATIENT)
Dept: NEUROSURGERY | Facility: CLINIC | Age: 52
End: 2022-10-20

## 2022-10-20 DIAGNOSIS — Z98.890 S/P LUMBAR MICRODISCECTOMY: ICD-10-CM

## 2022-10-20 NOTE — TELEPHONE ENCOUNTER
Patient sent [employer] message stating her MRI has not been approved and that her insurance is requesting a PA or P2P.     Writer contacted financial securing for assistance. Financial securing stated MRI is pending due to having the same test at the beginning of this month. She stated she submitted supporting clinicals.    Updated patient via [employer].

## 2022-10-20 NOTE — PROGRESS NOTES
[employer] STD forms completed     Family leave form received for patient's daughter completed     Both forms placed in

## 2022-10-21 NOTE — TELEPHONE ENCOUNTER
Patient requesting refill of methocarbamol.     DOS: 10/5/22  Surgeon: Dr. Solis   Procedure: Left L3-4 minimally invasive microdiscectomy  Weeks Post op: 2 weeks 2 days  Last refilled: 10/17 #40 with one refill    Pain assessment completed via My Chart. Please see encounter for further details.     Writer called patient's pharmacy to determine if there was a refill still available for patient. Pharmacy staff confirms there is still a refill available and state refill will be automatically refilled on 10/23. Update provided to patient via [employer]. Advised patient to contact clinic when needing another refill.

## 2022-10-23 ENCOUNTER — HOSPITAL ENCOUNTER (OUTPATIENT)
Facility: CLINIC | Age: 52
Setting detail: OBSERVATION
Discharge: HOME | End: 2022-10-25
Attending: EMERGENCY MEDICINE | Admitting: HOSPITALIST
Payer: COMMERCIAL

## 2022-10-23 ENCOUNTER — APPOINTMENT (OUTPATIENT)
Dept: OCCUPATIONAL THERAPY | Facility: CLINIC | Age: 52
End: 2022-10-23
Attending: NURSE PRACTITIONER
Payer: COMMERCIAL

## 2022-10-23 ENCOUNTER — APPOINTMENT (OUTPATIENT)
Dept: MRI IMAGING | Facility: CLINIC | Age: 52
End: 2022-10-23
Attending: EMERGENCY MEDICINE
Payer: COMMERCIAL

## 2022-10-23 DIAGNOSIS — R20.0 LEFT LEG NUMBNESS: ICD-10-CM

## 2022-10-23 DIAGNOSIS — Z98.890 S/P LUMBAR MICRODISCECTOMY: ICD-10-CM

## 2022-10-23 DIAGNOSIS — M54.9 POSTOPERATIVE BACK PAIN: ICD-10-CM

## 2022-10-23 DIAGNOSIS — G89.18 POSTOPERATIVE BACK PAIN: ICD-10-CM

## 2022-10-23 LAB
ANION GAP SERPL CALCULATED.3IONS-SCNC: 7 MMOL/L (ref 3–14)
BASOPHILS # BLD AUTO: 0 10E3/UL (ref 0–0.2)
BASOPHILS NFR BLD AUTO: 1 %
BUN SERPL-MCNC: 18 MG/DL (ref 7–30)
CALCIUM SERPL-MCNC: 8.6 MG/DL (ref 8.5–10.1)
CHLORIDE BLD-SCNC: 105 MMOL/L (ref 94–109)
CO2 SERPL-SCNC: 25 MMOL/L (ref 20–32)
CREAT SERPL-MCNC: 0.62 MG/DL (ref 0.52–1.04)
CRP SERPL-MCNC: <2.9 MG/L (ref 0–8)
EOSINOPHIL # BLD AUTO: 0.4 10E3/UL (ref 0–0.7)
EOSINOPHIL NFR BLD AUTO: 7 %
ERYTHROCYTE [DISTWIDTH] IN BLOOD BY AUTOMATED COUNT: 11.1 % (ref 10–15)
ERYTHROCYTE [SEDIMENTATION RATE] IN BLOOD BY WESTERGREN METHOD: 8 MM/HR (ref 0–30)
GFR SERPL CREATININE-BSD FRML MDRD: >90 ML/MIN/1.73M2
GLUCOSE BLD-MCNC: 100 MG/DL (ref 70–99)
HCT VFR BLD AUTO: 39.8 % (ref 35–47)
HGB BLD-MCNC: 13.5 G/DL (ref 11.7–15.7)
IMM GRANULOCYTES # BLD: 0 10E3/UL
IMM GRANULOCYTES NFR BLD: 0 %
LYMPHOCYTES # BLD AUTO: 1.2 10E3/UL (ref 0.8–5.3)
LYMPHOCYTES NFR BLD AUTO: 20 %
MCH RBC QN AUTO: 32.3 PG (ref 26.5–33)
MCHC RBC AUTO-ENTMCNC: 33.9 G/DL (ref 31.5–36.5)
MCV RBC AUTO: 95 FL (ref 78–100)
MONOCYTES # BLD AUTO: 0.6 10E3/UL (ref 0–1.3)
MONOCYTES NFR BLD AUTO: 10 %
NEUTROPHILS # BLD AUTO: 3.7 10E3/UL (ref 1.6–8.3)
NEUTROPHILS NFR BLD AUTO: 62 %
NRBC # BLD AUTO: 0 10E3/UL
NRBC BLD AUTO-RTO: 0 /100
PLATELET # BLD AUTO: 226 10E3/UL (ref 150–450)
POTASSIUM BLD-SCNC: 4.6 MMOL/L (ref 3.4–5.3)
RBC # BLD AUTO: 4.18 10E6/UL (ref 3.8–5.2)
SARS-COV-2 RNA RESP QL NAA+PROBE: NEGATIVE
SODIUM SERPL-SCNC: 137 MMOL/L (ref 133–144)
WBC # BLD AUTO: 5.8 10E3/UL (ref 4–11)

## 2022-10-23 PROCEDURE — 97166 OT EVAL MOD COMPLEX 45 MIN: CPT | Mod: GO | Performed by: OCCUPATIONAL THERAPIST

## 2022-10-23 PROCEDURE — 86140 C-REACTIVE PROTEIN: CPT | Performed by: EMERGENCY MEDICINE

## 2022-10-23 PROCEDURE — 72158 MRI LUMBAR SPINE W/O & W/DYE: CPT

## 2022-10-23 PROCEDURE — 96376 TX/PRO/DX INJ SAME DRUG ADON: CPT

## 2022-10-23 PROCEDURE — 250N000013 HC RX MED GY IP 250 OP 250 PS 637: Performed by: NURSE PRACTITIONER

## 2022-10-23 PROCEDURE — A9585 GADOBUTROL INJECTION: HCPCS | Performed by: EMERGENCY MEDICINE

## 2022-10-23 PROCEDURE — 85652 RBC SED RATE AUTOMATED: CPT | Performed by: EMERGENCY MEDICINE

## 2022-10-23 PROCEDURE — 96375 TX/PRO/DX INJ NEW DRUG ADDON: CPT

## 2022-10-23 PROCEDURE — 250N000011 HC RX IP 250 OP 636: Performed by: EMERGENCY MEDICINE

## 2022-10-23 PROCEDURE — 99220 PR INITIAL OBSERVATION CARE,LEVEL III: CPT | Performed by: NURSE PRACTITIONER

## 2022-10-23 PROCEDURE — 99285 EMERGENCY DEPT VISIT HI MDM: CPT | Mod: 25

## 2022-10-23 PROCEDURE — 96374 THER/PROPH/DIAG INJ IV PUSH: CPT

## 2022-10-23 PROCEDURE — G0378 HOSPITAL OBSERVATION PER HR: HCPCS

## 2022-10-23 PROCEDURE — 85025 COMPLETE CBC W/AUTO DIFF WBC: CPT | Performed by: EMERGENCY MEDICINE

## 2022-10-23 PROCEDURE — 99024 POSTOP FOLLOW-UP VISIT: CPT | Performed by: PHYSICIAN ASSISTANT

## 2022-10-23 PROCEDURE — 82310 ASSAY OF CALCIUM: CPT | Performed by: EMERGENCY MEDICINE

## 2022-10-23 PROCEDURE — 97535 SELF CARE MNGMENT TRAINING: CPT | Mod: GO | Performed by: OCCUPATIONAL THERAPIST

## 2022-10-23 PROCEDURE — 250N000011 HC RX IP 250 OP 636: Performed by: PHYSICIAN ASSISTANT

## 2022-10-23 PROCEDURE — 250N000011 HC RX IP 250 OP 636: Performed by: NURSE PRACTITIONER

## 2022-10-23 PROCEDURE — 36415 COLL VENOUS BLD VENIPUNCTURE: CPT | Performed by: EMERGENCY MEDICINE

## 2022-10-23 PROCEDURE — C9803 HOPD COVID-19 SPEC COLLECT: HCPCS

## 2022-10-23 PROCEDURE — 255N000002 HC RX 255 OP 636: Performed by: EMERGENCY MEDICINE

## 2022-10-23 PROCEDURE — U0003 INFECTIOUS AGENT DETECTION BY NUCLEIC ACID (DNA OR RNA); SEVERE ACUTE RESPIRATORY SYNDROME CORONAVIRUS 2 (SARS-COV-2) (CORONAVIRUS DISEASE [COVID-19]), AMPLIFIED PROBE TECHNIQUE, MAKING USE OF HIGH THROUGHPUT TECHNOLOGIES AS DESCRIBED BY CMS-2020-01-R: HCPCS | Performed by: EMERGENCY MEDICINE

## 2022-10-23 RX ORDER — MUPIROCIN CALCIUM 20 MG/G
CREAM TOPICAL DAILY PRN
COMMUNITY
End: 2023-12-12

## 2022-10-23 RX ORDER — METHOCARBAMOL 750 MG/1
750 TABLET, FILM COATED ORAL EVERY 6 HOURS PRN
Status: DISCONTINUED | OUTPATIENT
Start: 2022-10-23 | End: 2022-10-25 | Stop reason: HOSPADM

## 2022-10-23 RX ORDER — OXYCODONE HYDROCHLORIDE 5 MG/1
5-10 TABLET ORAL EVERY 4 HOURS PRN
Status: DISCONTINUED | OUTPATIENT
Start: 2022-10-23 | End: 2022-10-25 | Stop reason: HOSPADM

## 2022-10-23 RX ORDER — HYDROMORPHONE HYDROCHLORIDE 1 MG/ML
0.5 INJECTION, SOLUTION INTRAMUSCULAR; INTRAVENOUS; SUBCUTANEOUS EVERY 4 HOURS PRN
Status: DISCONTINUED | OUTPATIENT
Start: 2022-10-23 | End: 2022-10-25 | Stop reason: HOSPADM

## 2022-10-23 RX ORDER — ACETAMINOPHEN 500 MG
500 TABLET ORAL EVERY 6 HOURS PRN
Status: DISCONTINUED | OUTPATIENT
Start: 2022-10-23 | End: 2022-10-25 | Stop reason: HOSPADM

## 2022-10-23 RX ORDER — CYCLOBENZAPRINE HCL 10 MG
10 TABLET ORAL 3 TIMES DAILY PRN
Status: DISCONTINUED | OUTPATIENT
Start: 2022-10-23 | End: 2022-10-23

## 2022-10-23 RX ORDER — DICLOFENAC SODIUM 25 MG/1
75 TABLET, DELAYED RELEASE ORAL 2 TIMES DAILY
Status: DISCONTINUED | OUTPATIENT
Start: 2022-10-23 | End: 2022-10-23

## 2022-10-23 RX ORDER — POLYETHYLENE GLYCOL 3350 17 G/17G
1 POWDER, FOR SOLUTION ORAL DAILY PRN
COMMUNITY
End: 2023-03-22

## 2022-10-23 RX ORDER — DEXAMETHASONE SODIUM PHOSPHATE 4 MG/ML
4 INJECTION, SOLUTION INTRA-ARTICULAR; INTRALESIONAL; INTRAMUSCULAR; INTRAVENOUS; SOFT TISSUE EVERY 6 HOURS
Status: DISCONTINUED | OUTPATIENT
Start: 2022-10-23 | End: 2022-10-24

## 2022-10-23 RX ORDER — HYDROMORPHONE HYDROCHLORIDE 1 MG/ML
0.5 INJECTION, SOLUTION INTRAMUSCULAR; INTRAVENOUS; SUBCUTANEOUS EVERY 30 MIN PRN
Status: COMPLETED | OUTPATIENT
Start: 2022-10-23 | End: 2022-10-23

## 2022-10-23 RX ORDER — POLYETHYLENE GLYCOL 3350 17 G/17G
17 POWDER, FOR SOLUTION ORAL DAILY PRN
Status: DISCONTINUED | OUTPATIENT
Start: 2022-10-23 | End: 2022-10-25 | Stop reason: HOSPADM

## 2022-10-23 RX ORDER — VENLAFAXINE HYDROCHLORIDE 150 MG/1
150 CAPSULE, EXTENDED RELEASE ORAL DAILY
Status: DISCONTINUED | OUTPATIENT
Start: 2022-10-23 | End: 2022-10-25 | Stop reason: HOSPADM

## 2022-10-23 RX ORDER — AMOXICILLIN 250 MG
1 CAPSULE ORAL DAILY PRN
Status: DISCONTINUED | OUTPATIENT
Start: 2022-10-23 | End: 2022-10-25 | Stop reason: HOSPADM

## 2022-10-23 RX ORDER — GABAPENTIN 300 MG/1
300 CAPSULE ORAL 3 TIMES DAILY
Status: DISCONTINUED | OUTPATIENT
Start: 2022-10-23 | End: 2022-10-25 | Stop reason: HOSPADM

## 2022-10-23 RX ORDER — DEXAMETHASONE SODIUM PHOSPHATE 4 MG/ML
10 INJECTION, SOLUTION INTRA-ARTICULAR; INTRALESIONAL; INTRAMUSCULAR; INTRAVENOUS; SOFT TISSUE ONCE
Status: COMPLETED | OUTPATIENT
Start: 2022-10-23 | End: 2022-10-23

## 2022-10-23 RX ORDER — ONDANSETRON 2 MG/ML
4 INJECTION INTRAMUSCULAR; INTRAVENOUS EVERY 6 HOURS PRN
Status: DISCONTINUED | OUTPATIENT
Start: 2022-10-23 | End: 2022-10-25 | Stop reason: HOSPADM

## 2022-10-23 RX ORDER — GADOBUTROL 604.72 MG/ML
7 INJECTION INTRAVENOUS ONCE
Status: COMPLETED | OUTPATIENT
Start: 2022-10-23 | End: 2022-10-23

## 2022-10-23 RX ORDER — ONDANSETRON 4 MG/1
4 TABLET, ORALLY DISINTEGRATING ORAL EVERY 6 HOURS PRN
Status: DISCONTINUED | OUTPATIENT
Start: 2022-10-23 | End: 2022-10-25 | Stop reason: HOSPADM

## 2022-10-23 RX ORDER — TRAZODONE HYDROCHLORIDE 100 MG/1
100 TABLET ORAL
Status: DISCONTINUED | OUTPATIENT
Start: 2022-10-23 | End: 2022-10-25 | Stop reason: HOSPADM

## 2022-10-23 RX ADMIN — OXYCODONE HYDROCHLORIDE 10 MG: 5 TABLET ORAL at 22:37

## 2022-10-23 RX ADMIN — HYDROMORPHONE HYDROCHLORIDE 0.5 MG: 1 INJECTION, SOLUTION INTRAMUSCULAR; INTRAVENOUS; SUBCUTANEOUS at 21:02

## 2022-10-23 RX ADMIN — OXYCODONE HYDROCHLORIDE 10 MG: 5 TABLET ORAL at 13:36

## 2022-10-23 RX ADMIN — GABAPENTIN 300 MG: 300 CAPSULE ORAL at 21:02

## 2022-10-23 RX ADMIN — HYDROMORPHONE HYDROCHLORIDE 0.5 MG: 1 INJECTION, SOLUTION INTRAMUSCULAR; INTRAVENOUS; SUBCUTANEOUS at 11:48

## 2022-10-23 RX ADMIN — DEXAMETHASONE SODIUM PHOSPHATE 4 MG: 4 INJECTION, SOLUTION INTRAMUSCULAR; INTRAVENOUS at 13:36

## 2022-10-23 RX ADMIN — VENLAFAXINE HYDROCHLORIDE 150 MG: 150 CAPSULE, EXTENDED RELEASE ORAL at 11:49

## 2022-10-23 RX ADMIN — GABAPENTIN 300 MG: 300 CAPSULE ORAL at 13:36

## 2022-10-23 RX ADMIN — TRAZODONE HYDROCHLORIDE 100 MG: 100 TABLET ORAL at 22:38

## 2022-10-23 RX ADMIN — GADOBUTROL 7 ML: 604.72 INJECTION INTRAVENOUS at 09:27

## 2022-10-23 RX ADMIN — HYDROMORPHONE HYDROCHLORIDE 0.5 MG: 1 INJECTION, SOLUTION INTRAMUSCULAR; INTRAVENOUS; SUBCUTANEOUS at 10:34

## 2022-10-23 RX ADMIN — HYDROMORPHONE HYDROCHLORIDE 0.5 MG: 1 INJECTION, SOLUTION INTRAMUSCULAR; INTRAVENOUS; SUBCUTANEOUS at 09:50

## 2022-10-23 RX ADMIN — METHOCARBAMOL 750 MG: 750 TABLET ORAL at 11:49

## 2022-10-23 RX ADMIN — METHOCARBAMOL 750 MG: 750 TABLET ORAL at 14:51

## 2022-10-23 RX ADMIN — ACETAMINOPHEN 500 MG: 500 TABLET ORAL at 13:36

## 2022-10-23 RX ADMIN — OXYCODONE HYDROCHLORIDE 10 MG: 5 TABLET ORAL at 17:52

## 2022-10-23 RX ADMIN — DEXAMETHASONE SODIUM PHOSPHATE 4 MG: 4 INJECTION, SOLUTION INTRAMUSCULAR; INTRAVENOUS at 18:03

## 2022-10-23 RX ADMIN — DEXAMETHASONE SODIUM PHOSPHATE 10 MG: 4 INJECTION, SOLUTION INTRAMUSCULAR; INTRAVENOUS at 09:50

## 2022-10-23 RX ADMIN — HYDROMORPHONE HYDROCHLORIDE 0.5 MG: 1 INJECTION, SOLUTION INTRAMUSCULAR; INTRAVENOUS; SUBCUTANEOUS at 08:19

## 2022-10-23 RX ADMIN — HYDROMORPHONE HYDROCHLORIDE 0.5 MG: 1 INJECTION, SOLUTION INTRAMUSCULAR; INTRAVENOUS; SUBCUTANEOUS at 16:23

## 2022-10-23 ASSESSMENT — ACTIVITIES OF DAILY LIVING (ADL)
ADLS_ACUITY_SCORE: 38
ADLS_ACUITY_SCORE: 35
ADLS_ACUITY_SCORE: 35
ADLS_ACUITY_SCORE: 38

## 2022-10-23 ASSESSMENT — ENCOUNTER SYMPTOMS
RHINORRHEA: 0
ABDOMINAL PAIN: 0
SORE THROAT: 0
NAUSEA: 0
FEVER: 0
BACK PAIN: 1
NUMBNESS: 1
VOMITING: 0

## 2022-10-23 NOTE — PHARMACY-ADMISSION MEDICATION HISTORY
Pharmacy Medication History  Admission medication history interview status for the 10/23/2022  admission is complete. See EPIC admission navigator for prior to admission medications     Location of Interview: Patient room  Medication history sources: Patient, Surescripts and Care Everywhere    Significant changes made to the medication list:    - removed cyclobenzaprine 10 mg daily TID PRN from medication list - pt reports switch to taking methocarbamol 750 mg Q6H PRN   - also, pt states taking methocarbamol more like TID dosing daily than as prescribed Q6H PRN dosing    - added Miralax powder daily PRN - pt reports switch to taking Miralax from senna-docusate tablets for constipation  - removed diclofenac 75 mg BID and vitamin D3 50 mcg daily - pt reports no longer taking  - pt reports finishing hydrocodone-APAP 5-325 mg tablets and switched to oxycodone 5 mg tablets - so only narcotic taking is oxycodone 5 mg tablets    In the past week, patient estimated taking medication this percent of the time: greater than 90%    Additional medication history information:     - pt reports recently completing course of methylprednisolone 4 mg tablets on 10/21    - pt takes several medications seasonally only and is not taking them at this time of year: azelastine 0.1% nasal spray, fluticasone 50 mcg nasal spray, loratadine 10 mg tablet,     Medication reconciliation completed by provider prior to medication history? Yes    Time spent in this activity: 15 minutes    Prior to Admission medications    Medication Sig Last Dose Taking? Auth Provider Long Term End Date   acetaminophen (TYLENOL) 500 MG tablet Take 500 mg by mouth every 6 hours as needed for mild pain PRN at PRN Yes Unknown, Entered By History     azelastine (ASTELIN) 0.1 % nasal spray Spray 2 sprays into both nostrils 2 times daily  Patient taking differently: Spray 2 sprays into both nostrils 2 times daily as needed Takes seasonally only SEASONAL at SEASONAL Yes  Marni Montemayor MD     EPINEPHrine (ANY BX GENERIC EQUIV) 0.3 MG/0.3ML injection 2-pack Inject 0.3 mLs (0.3 mg) into the muscle as needed for anaphylaxis PRN at PRN Yes Marni Montemayor MD     fluticasone (FLONASE) 50 MCG/ACT nasal spray Spray 2 sprays into both nostrils At Bedtime    Patient taking differently: Spray 2 sprays into both nostrils daily as needed SEASONAL at SEASONAL Yes Marni Montemayor MD     gabapentin (NEURONTIN) 300 MG capsule Take 300 mg by mouth 3 times daily 10/22/2022 at PM Yes Reported, Patient     loratadine (CLARITIN) 10 MG tablet Take 10 mg by mouth daily SEASONAL at SEASONAL Yes Reported, Patient     methocarbamol (ROBAXIN) 750 MG tablet Take 1 tablet (750 mg) by mouth every 6 hours as needed for muscle spasms    Patient taking differently: Take 750 mg by mouth every 6 hours as needed for muscle spasms - pt states takes more like TID dosing 10/22/2022 at PM Yes Mirtha Montiel PA-C     montelukast (SINGULAIR) 10 MG tablet Take 1 tablet (10 mg) by mouth At Bedtime  Patient taking differently: Take 10 mg by mouth nightly as needed Takes seasonally only 10/22/2022 at AFTERNOON Yes Marni Montemayor MD Yes    mupirocin (BACTROBAN) 2 % external cream Apply topically daily as needed for skin burn PRN at PRN Yes Unknown, Entered By History     olopatadine (PATANOL) 0.1 % ophthalmic solution Apply 1 drop to eye daily as needed - takes seasonally only 10/22/2022 Yes Reported, Patient     oxyCODONE (ROXICODONE) 5 MG tablet Take 1-2 tablets (5-10 mg) by mouth every 4 hours as needed for moderate to severe pain 10/23/2022 at TOOK 2 TABS AT 0330 Yes Mirtha Montiel PA-C No    polyethylene glycol (MIRALAX) 17 GM/Dose powder Take 1 capful by mouth daily as needed for constipation PRN at PRN Yes Unknown, Entered By History     traZODone (DESYREL) 100 MG tablet TAKE 1/2 TO 1 TAB BY MOUTH NIGHTLY AS NEEDED FOR SLEEP 10/22/2022 at HS - TOOK 1 TAB Yes  Marni Montemayor MD Yes    venlafaxine (EFFEXOR XR) 150 MG 24 hr capsule Take 1 capsule (150 mg) by mouth daily 10/22/2022 at AM Yes Marni Montemayor MD Yes    senna-docusate (SENOKOT-S/PERICOLACE) 8.6-50 MG tablet Take 1 tablet by mouth daily as needed for constipation  Patient not taking: Reported on 10/23/2022 Not Taking  Aroldo Fowler PA-C         The information provided in this note is only as accurate as the sources available at the time of update(s)

## 2022-10-23 NOTE — ED NOTES
[facility]  ED Nurse Handoff Report    ED Chief complaint: Back Pain (Numbness below left knee)      ED Diagnosis:   Final diagnoses:   None       Code Status: Full Code    Allergies:   Allergies   Allergen Reactions     Banana Hives     Same with kiwi     Cats      Morphine Hives     Peanuts [Nuts]      Patient states she is allergic to all tree nuts.     Rocephin [Ceftriaxone]        Patient Story:   Pt had a L2-3 discectomy on 10/7/22. C/o numbness x3 days worse over night Painful since surgery    Focused Assessment:        Labs Ordered and Resulted from Time of ED Arrival to Time of ED Departure   BASIC METABOLIC PANEL - Abnormal       Result Value    Sodium 137      Potassium 4.6      Chloride 105      Carbon Dioxide (CO2) 25      Anion Gap 7      Urea Nitrogen 18      Creatinine 0.62      Calcium 8.6      Glucose 100 (*)     GFR Estimate >90     CRP INFLAMMATION - Normal    CRP Inflammation <2.9     ERYTHROCYTE SEDIMENTATION RATE AUTO - Normal    Erythrocyte Sedimentation Rate 8     COVID-19 VIRUS (CORONAVIRUS) BY PCR - Normal    SARS CoV2 PCR Negative     CBC WITH PLATELETS AND DIFFERENTIAL    WBC Count 5.8      RBC Count 4.18      Hemoglobin 13.5      Hematocrit 39.8      MCV 95      MCH 32.3      MCHC 33.9      RDW 11.1      Platelet Count 226      % Neutrophils 62      % Lymphocytes 20      % Monocytes 10      % Eosinophils 7      % Basophils 1      % Immature Granulocytes 0      NRBCs per 100 WBC 0      Absolute Neutrophils 3.7      Absolute Lymphocytes 1.2      Absolute Monocytes 0.6      Absolute Eosinophils 0.4      Absolute Basophils 0.0      Absolute Immature Granulocytes 0.0      Absolute NRBCs 0.0          Lumbar spine MRI w & w/o contrast - surgery <10yrs   Final Result   IMPRESSION:   1.  When compared to 10/03/2022, the patient has intervally undergone a left laminotomy at L3-L4 for resection of the previously seen left subarticular disc extrusion. The extruded disc material has  been removed and previous compression upon the    traversing left L4 nerve root has also improved. There is persistent circumferential enhancement surrounding the traversing left L4 nerve root which may now represent granulation tissue.      2.  There is a rim-enhancing dorsal midline fluid collection at the L3-L4 level which probably represents a postoperative seroma. Sterility of this collection is not assessed on this examination.            Treatments and/or interventions provided:     Medications   HYDROmorphone (PF) (DILAUDID) injection 0.5 mg (0.5 mg Intravenous Given 10/23/22 0950)   dexamethasone (DECADRON) injection 10 mg (10 mg Intravenous Given 10/23/22 0950)   gadobutrol (GADAVIST) injection 7 mL (7 mLs Intravenous Given 10/23/22 0927)        Patient's response to treatments and/or interventions: Resting in  Room. Up to BR with  assist.     To be done/followed up on inpatient unit:    See any in-patient orders    Does this patient have any cognitive concerns?: no     Activity level - Baseline/Home:    Independent    Activity Level - Current:     Stand with Assist    Patient's Preferred language: English     Needed?: No    Isolation: None  Infection: Not Applicable  Patient tested for COVID 19 prior to admission: YES    Bariatric?: No    Vital Signs:   Vitals:    10/23/22 0811 10/23/22 0930   BP: 110/78 124/63   Pulse: 82 64   Resp: 12    Temp: 98.4  F (36.9  C)    TempSrc: Oral    SpO2: 96% 98%   Weight: 79.4 kg (175 lb)    Height: 1.829 m (6')        Cardiac Rhythm:     Was the PSS-3 completed:   Yes  What interventions are required if any?               Family Comments:   OBS brochure/video discussed/provided to patient/family: No              Name of person given brochure if not patient:               Relationship to patient:     For the majority of the shift this patient's behavior was Green.   Behavioral interventions performed were     ED NURSE PHONE NUMBER: *[phone]

## 2022-10-23 NOTE — ED PROVIDER NOTES
History     Chief Complaint:  Back Pain (Numbness below left knee)      BELINDA Reyes is a 52 year old female who presents with back pain. The patient had a L3-L4 discectomy on October 7th. She was pain free for one week, but then started to have atraumatic back pain a week ago. She also started to have numbness 3-4 days ago that has worsened over time. The pain radiates throughout her left side, down to her leg. She has numbness below her left knee. She denies any problems with her right lower extremities. She denies fever, urinary/bowel incontinence, abdominal pain, nauea, vomiting, cough, or any cold symptoms. She had 2 mg of Dilaudid with EMS.    Review of Systems   Constitutional: Negative for fever.   HENT: Negative for congestion, rhinorrhea and sore throat.    Gastrointestinal: Negative for abdominal pain, nausea and vomiting.   Genitourinary:        No urinary/bowel incontinence   Musculoskeletal: Positive for back pain.   Neurological: Positive for numbness.   All other systems reviewed and are negative.    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  Pulmonary insufficiency following trauma and surgery  Lumbar disc herniation with myelopathy  thrombocytopenia  Idiopathic cardiomyopathy  Depression     Past Surgical History:    Colonoscopy  Lumbar discectomy  Skin debridement  Epidural injection  Tonsillectomy  Tooth extraction  Laminectomy  Tubal ligation  Endoscopy, UGI  Lumbar spine fusion, L4-L5, S1    Family History:    Father: anxiety,  heart failure, depression, alcohol/drug  Brother: anxiety  Sister: bipolar disorder  Paternal uncle: suicide  Mother: heart failure  Maternal aunt: breast cancer  Maternal grandfather: stomach cancer  Paternal grandmother: stomach cancer  Brother: depression    Social History:  Presents via EMS  Born in [facility]   Physical Exam     Patient Vitals for the past 24 hrs:   BP Temp Temp src Pulse Resp SpO2 Height Weight   10/23/22 0930 124/63 -- -- 64 -- 98 % -- --   10/23/22 0811 110/78 98.4  F (36.9  C) Oral 82 12 96 % 1.829 m (6') 79.4 kg (175 lb)       Physical Exam  Nursing note and vitals reviewed.  Constitutional:  Appears well-developed and well-nourished.   HENT:   Head:    Atraumatic.   Mouth/Throat:   Oropharynx is clear and moist. No oropharyngeal exudate.   Eyes:    Pupils are equal, round, and reactive to light.   Neck:    Normal range of motion. Neck supple.      No tracheal deviation present. No thyromegaly present.   Cardiovascular:  Normal rate, regular rhythm, no murmur   Pulmonary/Chest: Breath sounds are clear and equal without wheezes or crackles.  Abdominal:   Soft. Bowel sounds are normal. Exhibits no distension and      no mass. There is no tenderness.      There is no rebound and no guarding.   Back exam:                 Well-healing appearing surgical scar midline over the mid lumbar region with possible 2-3 centimeter diameter fluid collection to the superior aspect of the wound.  No redness, warmth, tenderness or wound dehiscence.  Musculoskeletal:  Exhibits no edema.   Lymphadenopathy:  No cervical adenopathy.   Neurological:   Alert and oriented to person, place, and time.  Decreased sensation to left leg compared to the right.  Weak dorsiflexion and plantarflexion strength on the left foot compared to the right.   strong and equal sensation of the arms and face normal.  Cranial nerves II through XII are intact.  Normal saddle area sensation. DTR's 2+/= bilateral patella and  Achilles.  Skin:    Skin is warm and dry. No rash noted. No pallor.   Emergency Department Course     Imaging:  Lumbar spine MRI w & w/o contrast - surgery <10yrs   Final Result   IMPRESSION:   1.  When compared to 10/03/2022, the patient has intervally undergone a left laminotomy at L3-L4 for resection of the previously seen left subarticular disc extrusion. The extruded disc material has been removed and previous compression upon the    traversing left L4 nerve root has also improved. There is persistent circumferential enhancement surrounding the traversing left L4 nerve root which may now represent granulation tissue.      2.  There is a rim-enhancing dorsal midline fluid collection at the L3-L4 level which probably represents a postoperative seroma. Sterility of this collection is not assessed on this examination.        Report per radiology    Laboratory:  Labs Ordered and Resulted from Time of ED Arrival to Time of ED Departure   BASIC METABOLIC PANEL - Abnormal       Result Value    Sodium 137      Potassium 4.6      Chloride 105      Carbon Dioxide (CO2) 25      Anion Gap 7      Urea Nitrogen 18      Creatinine 0.62      Calcium 8.6      Glucose 100 (*)     GFR Estimate >90     CRP INFLAMMATION - Normal    CRP Inflammation <2.9     ERYTHROCYTE SEDIMENTATION RATE AUTO - Normal    Erythrocyte Sedimentation Rate 8     COVID-19 VIRUS (CORONAVIRUS) BY PCR - Normal    SARS CoV2 PCR Negative     CBC WITH PLATELETS AND DIFFERENTIAL    WBC Count 5.8      RBC Count 4.18      Hemoglobin 13.5      Hematocrit 39.8      MCV 95      MCH 32.3      MCHC 33.9      RDW 11.1      Platelet Count 226      % Neutrophils 62      % Lymphocytes 20      % Monocytes 10      % Eosinophils 7      % Basophils 1      % Immature Granulocytes 0      NRBCs per 100 WBC 0      Absolute Neutrophils 3.7      Absolute Lymphocytes 1.2      Absolute Monocytes 0.6      Absolute Eosinophils 0.4      Absolute Basophils 0.0      Absolute Immature  Granulocytes 0.0      Absolute NRBCs 0.0       Emergency Department Course:    Reviewed:  I reviewed nursing notes, vitals, past medical history and Care Everywhere    Assessments:  0756 I obtained history and examined the patient as noted above.   1032 I rechecked the patient and explained findings.     Consults:   0820 I spoke with Shelby Riddle PA-C of the Neurosurgery service to discuss the patient's findings, presentation, and plan of care.  1025 I spoke with Shelby Riddle PA-C of the Neurosurgery service to discuss the patient's findings, presentation, and plan of care.  1042 I spoke with Dr. Montez of the Hospitalist service to discuss the patient's findings, presentation, and plan of care.    Interventions:  0819 Hydromorphone 0.5 mg IV  0950 Hydromorphone 0.5 mg IV  0950 Dexamethasone 10 mg IV    Disposition:  The patient was admitted to the hospital under the care of Dr. Montez.     Impression & Plan      Medical Decision Making:  I found this patient to have severe postoperative low back pain with radiculopathy down the left leg and what she reports to be new and worsening left leg numbness and weak dorsiflexion plantarflexion strength on her left compared to her right.  Therefore MRI imaging was performed which did not show any definite cause of her symptoms.  There was no sign of epidural abscess or hematoma.  I consulted neurosurgery and she was given dexamethasone IV as well as Dilaudid for pain and admitted to the hospital under the care of the hospitalist for neurosurgical consultation and further evaluation and treatment.  She does not have any red flags for cauda equina syndrome at this time and MRI imaging did not show any signs of cauda equina syndrome.    Covid-19  Jennifer Reyes was evaluated during a global COVID-19 pandemic, which necessitated consideration that the patient might be at risk for infection with the SARS-CoV-2 virus that causes COVID-19.   Applicable protocols for  evaluation were followed during the patient's care.   COVID-19 was considered as part of the patient's evaluation. Test result is pending.    Diagnosis:    ICD-10-CM    1. Postoperative back pain  G89.18     M54.9       2. Left leg numbness  R20.0         Scribe Disclosure:  JOANNE Trista Alciraed, am serving as a scribe at 8:12 AM on 10/23/2022 to document services personally performed by Kelsey Pa MD based on my observations and the provider's statements to me.           Kelsey Pa MD  10/23/22 1335

## 2022-10-23 NOTE — PROGRESS NOTES
RECEIVING UNIT ED HANDOFF REVIEW    ED Nurse Handoff Report was reviewed by: Andrea R Holstein, RN on October 23, 2022 at 11:57 AM     The patient will go into 2419

## 2022-10-23 NOTE — PROGRESS NOTES
10/23/22 1609   Appointment Info   Signing Clinician's Name / Credentials (OT) Deshawn Murillo EdD, OTR/L   Rehab Comments (OT) Initial evaluation   Living Environment   People in Home spouse;child(tiana), adult  (pt's daughter is staying with pt until 10/28.  Daughter came in from [place])   Current Living Arrangements house  (2 story with basement.  Bedroom on 2nd floor.  Baths in basement and on second floor)   Home Accessibility stairs to enter home;stairs within home   Number of Stairs, Main Entrance 4   Stair Railings, Main Entrance railings safe and in good condition;railings on both sides of stairs   Number of Stairs, Within Home, Primary seven   Stair Railings, Within Home, Primary railings safe and in good condition;railings on both sides of stairs   Transportation Anticipated family or friend will provide   Living Environment Comments Pt's spouse works during the week.  Can sometimes work from home.  Pt is a , can work from home.  Has not been working lately secondary to her surgery   Self-Care   Usual Activity Tolerance excellent   Current Activity Tolerance moderate   Regular Exercise Yes   Activity/Exercise Type walking;strength training   Exercise Amount/Frequency 3-5 times/wk   Equipment Currently Used at Home none   Fall history within last six months no   General Information   Onset of Illness/Injury or Date of Surgery 10/23/22   Referring Physician Henry Canales CNP   Patient/Family Therapy Goal Statement (OT) return home.  Pt also has a mother that is severely ill in [place].  Was trying to fly back to see her, worried that she is in danger of dying   Additional Occupational Profile Info/Pertinent History of Current Problem Pt is a 52 year old female admitted with low back and left leg pain.  Had minimally invasive L3-4 diskectomy for a herniated disc 10/15.  Had one week of no pain and then started having severe pain.  PMH includes depression, idiopathic cardiomyopathy,  thrombocyopenia.  Previous back surgery to L2-3 9 years ago.  Pt reports that it was a fusion.   Performance Patterns (Routines, Roles, Habits) pt reports being very active and independent normally.   Existing Precautions/Restrictions fall   General Observations and Info Pt in bed, grimmacing in pain with movement.  Rating current pain at a 7/10   Cognitive Status Examination   Orientation Status orientation to person, place and time   Affect/Mental Status (Cognitive) WNL   Visual Perception   Visual Impairment/Limitations corrective lenses full-time   Pain Assessment   Patient Currently in Pain Yes, see Vital Sign flowsheet   Range of Motion Comprehensive   General Range of Motion no range of motion deficits identified   Comment, General Range of Motion B UEs   Strength Comprehensive (MMT)   General Manual Muscle Testing (MMT) Assessment   (pt appears to have functional strength, unable to do MMT secondary to pain)   Coordination   Upper Extremity Coordination No deficits were identified   Bed Mobility   Bed Mobility supine-sit;sit-supine   Supine-Sit [place] (Bed Mobility) modified independence;verbal cues;supervision   Sit-Supine [place] (Bed Mobility) modified independence   Assistive Device (Bed Mobility) bed rails   Comment (Bed Mobility) pt is aware and knows how to use the logrolling technique.  Came up from a position of a raised head of bed.   Transfers   Transfers sit-stand transfer   Sit-Stand Transfer   Sit-Stand [place] (Transfers) supervision;verbal cues;contact guard   Assistive Device (Sit-Stand Transfers) walker, front-wheeled   Clinical Impression   Criteria for Skilled Therapeutic Interventions Met (OT) Yes, treatment indicated   OT Diagnosis decreased independence with ADLS and IADLS   OT Problem List-Impairments impacting ADL activity tolerance impaired;pain;strength;fear & anxiety   Assessment of Occupational Performance 3-5 Performance Deficits   Identified Performance Deficits  decreased independence in dressing, bathing, functional mobility, household and work chores   Planned Therapy Interventions (OT) ADL retraining;home program guidelines;progressive activity/exercise   Clinical Decision Making Complexity (OT) moderate complexity   Risk & Benefits of therapy have been explained patient;care plan/treatment goals reviewed;evaluation/treatment results reviewed;risks/benefits reviewed   OT Total Evaluation Time   OT Eval, Moderate Complexity Minutes (62524) 15   OT Goals   Therapy Frequency (OT) Daily   OT Predicted Duration/Target Date for Goal Attainment 10/28/22   OT Goals Upper Body Dressing;Lower Body Dressing;Toilet Transfer/Toileting;OT Goal 1;OT Goal 2   OT: Upper Body Dressing Modified independent;using adaptive equipment;within precautions;including set-up/clothing retrieval   OT: Lower Body Dressing Modified independent;using adaptive equipment;within precautions;including set-up/clothing retrieval   OT: Toilet Transfer/Toileting Modified independent;toilet transfer;cleaning and garment management;using adaptive equipment;within precautions   OT: Goal 1 Pt will identify 3 EC techniuqes to use to manage pain and energy for daily ADLS   OT: Goal 2 Pt will complete car transfer with Mod I and AE as needed   OT Discharge Planning   OT Plan OT: Further education on EC techniques.  TB dressing with AE, simulated shower and car transfers   OT Discharge Recommendation (DC Rec) home with assist;home with home care occupational therapy   OT Rationale for DC Rec Pt is knowledgable about her situation and rehab techniques for recovery.  Has support at home, however her spouse works and her daughter will be leaving for home shortly.  Pt as to climb at least 4 stairs to get into her home and then at least 7 stairs to get to a bathroom and shower.  Has walk-in shower with shower doors at home.  Pt also has a need to try to get back to her home in [facility], as her mother is currently very  sick.  Is physically able to complete dressing and bathroom transfers through the pain, use of AE may make this less painful.  Will work with pt to develop the best options/tools for ADLS at home.  Home health OT could be beneificial for seeing her home environment and modifying any options discussed here.   OT Brief overview of current status SBA/Min A for bed mobility and toilet transfers.  Min/Mod A for TB dressing.

## 2022-10-23 NOTE — ED TRIAGE NOTES
Brought in by EMS. Pt had a L2-3 discectomy on 10/7/22. C/o numbness x3 days worse over night Painful since surgery.      Triage Assessment     Row Name 10/23/22 0805       Triage Assessment (Adult)    Airway WDL WDL       Respiratory WDL    Respiratory WDL WDL       Skin Circulation/Temperature WDL    Skin Circulation/Temperature WDL WDL       Cardiac WDL    Cardiac WDL WDL       Peripheral/Neurovascular WDL    Peripheral Neurovascular WDL X;neurovascular assessment lower       LLE Neurovascular Assessment    Temperature LLE warm    Color LLE no discoloration    Sensation LLE numbness present       RLE Neurovascular Assessment    Temperature RLE warm    Color RLE no discoloration    Sensation RLE no tenderness;no tingling       Cognitive/Neuro/Behavioral WDL    Cognitive/Neuro/Behavioral WDL WDL

## 2022-10-23 NOTE — CONSULTS
Consult Date: 10/23/2022      IMPRESSION:  Two and a half weeks status post minimally invasive left L3 to L4 diskectomy, presenting with low back and left leg pain with imaging findings revealing resection of the previously noted herniated disk at the L3 to L4 level with some granulation tissue and postoperative seroma.    PLAN:  From the neurosurgical standpoint, we do not appreciate any need for any urgent operative neurosurgical intervention.  She will be admitted for pain control and Decadron.  She has been started on 4 mg IV q.6 hours, will observe her for the next 24 hours on Decadron and see how she does and discuss further with the neurosurgical team to see if any further intervention would be recommended such as a steroid injection.      TYPE OF VISIT:  The neurosurgical service was consulted see Mrs. Reyes for low back and left leg pain.    HISTORY OF PRESENT ILLNESS:  The patient is a 52-year-old female known to the neurosurgical team as she underwent left L3 to L4 minimally invasive diskectomy for herniated disk, performed by Dr. Solis on 10/05/2022.  She was discharged from the hospital the following day.  She has continued to have hip pain and left leg pain.  She contacted our office and an MRI was recommended.  She presented to the Emergency Room today via EMS for worsening left leg numbness and pain.  A lumbar MRI was obtained and Neurosurgery was consulted.  Currently, she complains of increasing numbness over the past 3 to 4 days to the left leg, also pain radiating down the left side of the leg that she had similar to her surgery.  No symptoms on the right leg.    PAST MEDICAL HISTORY:  Depression, anxiety, idiopathic cardiomyopathy, thrombocytopenia.    PAST SURGICAL HISTORY:  As described above, including the L3 to L4 minimally invasive diskectomy with Dr. Solis on 10/05/2022.  She also has postoperative changes of lumbar fusion at L4 to L5 and L5 to S1.  Other past surgical history:   Colonoscopy, tonsillectomy.    SOCIAL HISTORY:  , lives at home with her  from [facility].    MEDICATIONS:  Reviewed per the electronic medical record.    ALLERGIES:  REVIEWED.    PHYSICAL EXAMINATION:    VITAL SIGNS:  Temperature is 98.7, blood pressure is 124/83, pulse is 80, respiratory rate is 14.  GENERAL:  She is awake and alert, in no acute distress, pleasant during the exam.  She has a positive straight leg raise on the left at 15 degrees.  She has good iliopsoas and quadriceps strength.  She has good dorsi and plantarflexion on the right, good plantarflexion on the left.  Dorsiflexion difficult to tell whether she has any weakness as she does give giveaway weakness secondary to pain, but maybe some trace weakness on the left at dorsiflexion.  Sensation decreased in the left L4 to L5 nerve root distribution.    IMAGING STUDIES:  Includes a lumbar MRI performed today when compared to 10/03/2022 shows good resection of the herniated disk that was on the left at the L3 to L4 level.  There is some persistent enhancement surrounding the transverse and L4 nerve root, could be granulation tissue.  There is also a rim-enhancing fluid collection at the L3 to L4 level, which would most likely be consistent with a postoperative seroma.    LABORATORY DATA:  Normal sed rate, normal CRP.  White count is normal.    TOTAL TIME SPENT WITH THE PATIENT:  70 minutes, including 50 minutes of counseling and coordination of care.    Discussed with Dr. Dhaliwal.    Dictated by ZHERA DE LA VEGA        D: 10/23/2022   T: 10/23/2022   MT: AAYUSH    Name:     TAMICA RITTER  MRN:      [id number]        Account:      [id number]   :      1970           Consult Date: 10/23/2022     Document: [id number]

## 2022-10-23 NOTE — PROGRESS NOTES
10/22/22; [id number]    Hx of L3-4 discectomy on 10/7 w/ Dr. Solis; A&O X4; VSS on RA; A1 gb/walker to pivot to BSC; numbness on BLE ( below knee); pain managed w/ scheduled Gabapentin, PRN Oxycodone, Tylenol, Robaxin, and Dilaudid for breakthrough. Plan for neurosurg team to reassess in the AM; reg diet tolerated.

## 2022-10-23 NOTE — H&P
[facility]    History and Physical - Hospitalist Service       Date of Admission:  10/23/2022    Assessment & Plan      Jennifer Reyes is a 52 year old female admitted on 10/23/2022. She presented to the ED via EMS with c/o back and left hip pain with numbness on top left foot up to left shin. Patient s/p L3-L4 discectomy on October 7th. Patient reports she was pain free for one week, but then started to have atraumatic back pain a week ago and numbness 3-4 days ago that has worsened over time. Neurosurgery contacted by ED physician recs: decadron 10 mg IV and lumbar spine MRI done.      Postoperative back pain  Left leg numbness  S/P L3-L4 discectomy    Displacement of lumbar intervertebral disc   Lumbar disc herniation with myelopathy  -consult neurosurgery  -patient given dose of decadron in ED  -PT/OT   -fall precautions  -pain control: resume home robaxin, oxycodone, gabapentin, dilaudid   -neuro vascular checks q shift   -lumbar spine MRI: When compared to 10/03/2022, the patient has intervally undergone a left laminotomy at L3-L4 for resection of the previously seen left subarticular disc extrusion. The extruded disc material has been removed and previous compression upon the   traversing left L4 nerve root has also improved. There is persistent circumferential enhancement surrounding the traversing left L4 nerve root which may now represent granulation tissue.   2.  There is a rim-enhancing dorsal midline fluid collection at the L3-L4 level which probably represents a postoperative seroma. Sterility of this collection is not assessed on this examination.    Depression/Anxiety   -resume home Effexor     Hx of MI    Idiopathic cardiomyopathy  -patient not on any medication    Anemia    Thrombocytopenia   -on CBC Hgb and Plt stable   -monitor       Diet: Regular Diet Adult    DVT Prophylaxis: Pneumatic Compression Devices  Miles Catheter: Not present  Central Lines: None  Cardiac  Monitoring: None  Code Status: Full Code      Clinically Significant Risk Factors Present on Admission                              Disposition Plan      Expected Discharge Date: 10/24/2022                The patient's care was discussed with the Attending Physician, Dr. Montez.    Henry Canales, CNP  Hospitalist Service  [facility]  Securely message with the Vocera Web Console (learn more here)  Text page via [facility] Paging/Directory         ______________________________________________________________________    Chief Complaint   Back Pain (Numbness below left knee)    History is obtained from the patient    History of Present Illness   Jennifer Reyes is a 52 year old female who present to the ED via EMS with c/o back and left hip pain with numbness on top left foot up to left shin. Patient s/p L3-L4 discectomy on October 7th. Patient reports she was pain free for one week, but then started to have atraumatic back pain a week ago and numbness 3-4 days ago that has worsened over time. PMHx of:  Anemia, Anxiety, Depression, Duodenitis hemorrhage, Displacement of lumbar intervertebral disc, Lumbago, MI  Pulmonary insufficiency following trauma and surgery,   Lumbar disc herniation with myelopathy, thrombocytopenia  Idiopathic cardiomyopathy.    Review of Systems    Constitutional: Negative for fever.   HENT: Negative for congestion, rhinorrhea and sore throat.    Gastrointestinal: Negative for abdominal pain, nausea and vomiting.   Genitourinary:        No urinary/bowel incontinence   Musculoskeletal: Positive for back pain.   Neurological: Positive for numbness.   All other systems reviewed and are negative.    Past Medical History    I have reviewed this patient's medical history and updated it with pertinent information if needed.   Past Medical History:   Diagnosis Date     Anemia, unspecified      Anxiety state, unspecified      Apnea      Contact dermatitis and other eczema due  to drugs and medicines in contact with skin 05/16/2007    Allergic reaction to lavendar aroma oil.     Contact dermatitis and other eczema due to plants (except food)      Depressive disorder, not elsewhere classified     had since 15, well controlled     Depressive disorder, not elsewhere classified 11/09/08    Suicidal ideation - [facility] admission     Displacement of lumbar intervertebral disc without myelopathy     L4-5     Duodenitis with hemorrhage 11/09/08     Erythema multiforme 07/22/2005    With secondary cellulitis, poison ivy rxn.  -[facility] admit.     Lumbago 02/20/10    Transfer to [facility]     Myocardial infarction (H) 2010    idiopathic cardiomyopathy     Other pulmonary insufficiency, not elsewhere classified     Vocal cord dysfunction     Poisoning and toxic reactions caused by other plants      Pulmonary insufficiency following trauma and surgery 05/28/06    Admit.Discharged 06/01/06     Syncope 7/7/10    Elevated troponin, transfer to [facility]     Syncope and collapse     due to hypotension       Past Surgical History   I have reviewed this patient's surgical history and updated it with pertinent information if needed.  Past Surgical History:   Procedure Laterality Date     COLONOSCOPY  10/2020    Large sessile serrated adenoma, repeat 3 years     DISCECTOMY LUMBAR MINIMALLY INVASIVE ONE LEVEL Left 10/5/2022    Procedure: Left Lumbar 3 to Lumbar 4 minimally invasive microdiscectomy;  Surgeon: Ricki Solis MD;  Location:  OR     HC DEBRIDE SKIN/SUBQ TISSUE  11/16/2009    Post-op wound     HC INJ EPIDURAL LUMBAR/SACRAL W/WO CONTRAST  2009    Left L4-5     HC LAP,FULGURATE/EXCISE LESIONS  02/27/2007    D[place], fulguration of endometriosis.  [facility]     HC REMOVAL OF TONSILS,<11 Y/O  Age 7 or 8     HC TOOTH EXTRACTION W/FORCEP  Age 17    [place] teeth extraction x4.     HYSTERECTOMY      approx 2008 - ovaries still in, unsure about cervix. no cancer. had fibroids.      HYSTERECTOMY, PAP NO LONGER INDICATED       LAMINECT/DISCECTOMY, LUMBAR  10/24/2009     TUBAL LIGATION  02/27/2007    [facility]    L4 and L5 to S1     [facility] UGI ENDOSCOPY, SIMPLE EXAM  11/08/2008       Social History   I have reviewed this patient's social history and updated it with pertinent information if needed.  Social History     Tobacco Use     Smoking status: Never     Smokeless tobacco: Never   Substance Use Topics     Alcohol use: Yes     Comment: socially     Drug use: No       Family History   I have reviewed this patient's family history and updated it with pertinent information if needed.  Family History   Problem Relation Age of Onset     Anxiety Disorder Father      Heart Failure Father      Depression Father      Alcohol/Drug Father      Anxiety Disorder Brother      Bipolar Disorder Sister      Suicide Paternal Uncle      Heart Failure Mother      Breast Cancer Maternal Aunt      Cancer Maternal Grandfather         stomach     Cancer Paternal Grandmother         stomach     Depression Brother        Prior to Admission Medications   Prior to Admission Medications   Prescriptions Last Dose Informant Patient Reported? Taking?   EPINEPHrine (ANY BX GENERIC EQUIV) 0.3 MG/0.3ML injection 2-pack PRN at PRN Self No Yes   Sig: Inject 0.3 mLs (0.3 mg) into the muscle as needed for anaphylaxis   acetaminophen (TYLENOL) 500 MG tablet PRN at PRN Self Yes Yes   Sig: Take 500 mg by mouth every 6 hours as needed for mild pain   azelastine (ASTELIN) 0.1 % nasal spray SEASONAL at SEASONAL Self No Yes   Sig: Spray 2 sprays into both nostrils 2 times daily   Patient taking differently: Spray 2 sprays into both nostrils 2 times daily as needed Takes seasonally only   fluticasone (FLONASE) 50 MCG/ACT nasal spray SEASONAL at SEASONAL Self No Yes   Sig: Spray 2 sprays into both nostrils At Bedtime   Patient taking differently: Spray 2 sprays into both nostrils daily as needed    gabapentin (NEURONTIN) 300 MG capsule 10/22/2022 at PM Self Yes Yes   Sig: Take 300 mg by mouth 3 times daily   loratadine (CLARITIN) 10 MG tablet SEASONAL at SEASONAL Self Yes Yes   Sig: Take 10 mg by mouth daily   methocarbamol (ROBAXIN) 750 MG tablet 10/22/2022 at PM Self No Yes   Sig: Take 1 tablet (750 mg) by mouth every 6 hours as needed for muscle spasms   Patient taking differently: Take 750 mg by mouth every 6 hours as needed for muscle spasms - pt states takes more like TID dosing   montelukast (SINGULAIR) 10 MG tablet 10/22/2022 at AFTERNOON Self No Yes   Sig: Take 1 tablet (10 mg) by mouth At Bedtime   Patient taking differently: Take 10 mg by mouth nightly as needed Takes seasonally only   mupirocin (BACTROBAN) 2 % external cream PRN at PRN Self Yes Yes   Sig: Apply topically daily as needed for skin burn   olopatadine (PATANOL) 0.1 % ophthalmic solution 10/22/2022 Self Yes Yes   Sig: Apply 1 drop to eye daily as needed - takes seasonally only   oxyCODONE (ROXICODONE) 5 MG tablet 10/23/2022 at TOOK 2 TABS AT 0330 Self No Yes   Sig: Take 1-2 tablets (5-10 mg) by mouth every 4 hours as needed for moderate to severe pain   polyethylene glycol (MIRALAX) 17 GM/Dose powder PRN at PRN Self Yes Yes   Sig: Take 1 capful by mouth daily as needed for constipation   senna-docusate (SENOKOT-S/PERICOLACE) 8.6-50 MG tablet Not Taking Self No No   Sig: Take 1 tablet by mouth daily as needed for constipation   Patient not taking: Reported on 10/23/2022   traZODone (DESYREL) 100 MG tablet 10/22/2022 at HS - TOOK 1 TAB Self No Yes   Sig: TAKE 1/2 TO 1 TAB BY MOUTH NIGHTLY AS NEEDED FOR SLEEP   venlafaxine (EFFEXOR XR) 150 MG 24 hr capsule 10/22/2022 at AM Self No Yes   Sig: Take 1 capsule (150 mg) by mouth daily      Facility-Administered Medications: None     Allergies   Allergies   Allergen Reactions     Banana Hives     Same with kiwi     Cats      Morphine Hives     Peanuts [Nuts]      Patient states she is allergic  to all tree nuts.     Rocephin [Ceftriaxone]        Physical Exam   Vital Signs: Temp: 98.4  F (36.9  C) Temp src: Oral BP: 124/63 Pulse: 64   Resp: 12 SpO2: 98 % O2 Device: None (Room air)    Weight: 175 lbs 0 oz  Nursing note and vitals reviewed.  Constitutional:Appears well-developed and well-nourished.   HENT:   Head: Atraumatic.   Mouth/Throat:Oropharynx is clear and moist. No oropharyngeal exudate.   Eyes:Pupils are equal, round, and reactive to light.   Neck: Normal range of motion. Neck supple. No tracheal deviation present. No thyromegaly present.   Cardiovascular:Normal rate, regular rhythm, no murmur   Pulmonary/Chest: Breath sounds are clear and equal without wheezes or crackles.  Abdominal: Soft. Bowel sounds are normal. Exhibits no distension and no mass. There is no tenderness. There is no rebound and no guarding.   Musculoskeletal: Exhibits no edema.   Lymphadenopathy:No cervical adenopathy.   Neurological: Alert and oriented to person, place, and time.   Skin:Skin is warm and dry. No rash noted. No pallor.     Data   Data reviewed today: I reviewed all medications, new labs and imaging results over the last 24 hours. I personally reviewed   Recent Labs   Lab 10/23/22  0812   WBC 5.8   HGB 13.5   MCV 95         POTASSIUM 4.6   CHLORIDE 105   CO2 25   BUN 18   CR 0.62   ANIONGAP 7   ISAAC 8.6   *     Most Recent Hemoglobin A1c:No lab results found.  5.8    \    13.5    /    226   N 62    L N/A    137    105    18 /   ------------------------------------ 100 (H)   ALT N/A   AST N/A   AP N/A   ALB N/A   Ca 8.6  4.6    25    0.62 \    % RETIC N/A    LDH N/A  Troponin N/A    BNP N/A    CK N/A  INR N/A   PTT N/A    D-dimer N/A    Fibrinogen N/A    Antithrombin N/A  Ferritin N/A  CRP <2.9    IL-6 N/A  Recent Results (from the past 24 hour(s))   Lumbar spine MRI w & w/o contrast - surgery <10yrs    Narrative    EXAM: MR LUMBAR SPINE W/O and W CONTRAST  LOCATION: [facility]  [facility]  DATE/TIME: 10/23/2022 9:28 AM    INDICATION: L3 4 microdiscectomy 10 5 22 and now severe low back pain and numbness left leg with weakness left foot  COMPARISON: MRI lumbar spine 10/03/2022  CONTRAST: 7mL gadavist  TECHNIQUE: Routine Lumbar Spine MRI without and with IV contrast.    FINDINGS:   Nomenclature is based on 5 lumbar type vertebral bodies. Normal vertebral body heights. Redemonstration of the interbody spacers at L4-L5 and L5-S1. Unchanged appearance of the L5 laminectomy. No suspicious bone marrow signal abnormality. Alignment is   maintained. Normal distal spinal cord and cauda equina with conus medullaris at L1. No extraspinal abnormality. Unremarkable visualized bony pelvis.    T12-L1: Normal disc height and signal. No herniation. Normal facets. No spinal canal or neural foraminal stenosis.     L1-L2: Normal disc height and signal. No herniation. Normal facets. No spinal canal or neural foraminal stenosis.    L2-L3: Normal disc height and signal. No herniation. Normal facets. No spinal canal or neural foraminal stenosis.     L3-L4: Unchanged mild loss of disc height and T2-weighted signal in the disc. Interval left laminotomy for resection of the previously seen left subarticular disc extrusion. Previous mass effect/compression upon the traversing left L4 nerve root appears   to have resolved. Unchanged mild bilateral facet arthropathy. No spinal canal stenosis. No neural foraminal stenosis. There is a rim-enhancing dorsal midline fluid collection measuring 2.9 cm in craniocaudal dimension and 1.7 cm in the transverse   dimension at the L3-L4 level. Additionally, there is enhancement along the left laminotomy tract extending along the left lateral aspect of the spinal canal and circumferential enhancement surrounding the traversing left L4 nerve root.    L4-L5: Unchanged interbody spacer. No spinal canal stenosis. No facet arthropathy. No neural foraminal stenosis.    L5-S1:  Unchanged interbody spacer. Unchanged laminectomy. Unchanged mild bilateral facet arthropathy. No spinal canal or neural foraminal stenosis.      Impression    IMPRESSION:  1.  When compared to 10/03/2022, the patient has intervally undergone a left laminotomy at L3-L4 for resection of the previously seen left subarticular disc extrusion. The extruded disc material has been removed and previous compression upon the   traversing left L4 nerve root has also improved. There is persistent circumferential enhancement surrounding the traversing left L4 nerve root which may now represent granulation tissue.    2.  There is a rim-enhancing dorsal midline fluid collection at the L3-L4 level which probably represents a postoperative seroma. Sterility of this collection is not assessed on this examination.

## 2022-10-24 ENCOUNTER — MYC MEDICAL ADVICE (OUTPATIENT)
Dept: NEUROSURGERY | Facility: CLINIC | Age: 52
End: 2022-10-24

## 2022-10-24 ENCOUNTER — APPOINTMENT (OUTPATIENT)
Dept: PHYSICAL THERAPY | Facility: CLINIC | Age: 52
End: 2022-10-24
Attending: NURSE PRACTITIONER
Payer: COMMERCIAL

## 2022-10-24 DIAGNOSIS — Z98.890 S/P LUMBAR MICRODISCECTOMY: ICD-10-CM

## 2022-10-24 LAB
ANION GAP SERPL CALCULATED.3IONS-SCNC: 10 MMOL/L (ref 3–14)
BUN SERPL-MCNC: 19 MG/DL (ref 7–30)
CALCIUM SERPL-MCNC: 9.2 MG/DL (ref 8.5–10.1)
CHLORIDE BLD-SCNC: 106 MMOL/L (ref 94–109)
CO2 SERPL-SCNC: 21 MMOL/L (ref 20–32)
CREAT SERPL-MCNC: 0.58 MG/DL (ref 0.52–1.04)
ERYTHROCYTE [DISTWIDTH] IN BLOOD BY AUTOMATED COUNT: 10.8 % (ref 10–15)
GFR SERPL CREATININE-BSD FRML MDRD: >90 ML/MIN/1.73M2
GLUCOSE BLD-MCNC: 149 MG/DL (ref 70–99)
HCT VFR BLD AUTO: 39.4 % (ref 35–47)
HGB BLD-MCNC: 13.5 G/DL (ref 11.7–15.7)
MCH RBC QN AUTO: 32.1 PG (ref 26.5–33)
MCHC RBC AUTO-ENTMCNC: 34.3 G/DL (ref 31.5–36.5)
MCV RBC AUTO: 94 FL (ref 78–100)
PLATELET # BLD AUTO: 232 10E3/UL (ref 150–450)
POTASSIUM BLD-SCNC: 4.1 MMOL/L (ref 3.4–5.3)
RBC # BLD AUTO: 4.2 10E6/UL (ref 3.8–5.2)
SODIUM SERPL-SCNC: 137 MMOL/L (ref 133–144)
WBC # BLD AUTO: 7.5 10E3/UL (ref 4–11)

## 2022-10-24 PROCEDURE — 99225 PR SUBSEQUENT OBSERVATION CARE,LEVEL II: CPT | Performed by: HOSPITALIST

## 2022-10-24 PROCEDURE — 85027 COMPLETE CBC AUTOMATED: CPT | Performed by: NURSE PRACTITIONER

## 2022-10-24 PROCEDURE — G0378 HOSPITAL OBSERVATION PER HR: HCPCS

## 2022-10-24 PROCEDURE — 97530 THERAPEUTIC ACTIVITIES: CPT | Mod: GP | Performed by: PHYSICAL THERAPIST

## 2022-10-24 PROCEDURE — 250N000013 HC RX MED GY IP 250 OP 250 PS 637: Performed by: NURSE PRACTITIONER

## 2022-10-24 PROCEDURE — 250N000011 HC RX IP 250 OP 636: Performed by: PHYSICIAN ASSISTANT

## 2022-10-24 PROCEDURE — 96376 TX/PRO/DX INJ SAME DRUG ADON: CPT

## 2022-10-24 PROCEDURE — 250N000011 HC RX IP 250 OP 636: Performed by: NURSE PRACTITIONER

## 2022-10-24 PROCEDURE — 80048 BASIC METABOLIC PNL TOTAL CA: CPT | Performed by: NURSE PRACTITIONER

## 2022-10-24 PROCEDURE — 36415 COLL VENOUS BLD VENIPUNCTURE: CPT | Performed by: NURSE PRACTITIONER

## 2022-10-24 PROCEDURE — 97161 PT EVAL LOW COMPLEX 20 MIN: CPT | Mod: GP | Performed by: PHYSICAL THERAPIST

## 2022-10-24 RX ORDER — NALOXONE HYDROCHLORIDE 0.4 MG/ML
0.4 INJECTION, SOLUTION INTRAMUSCULAR; INTRAVENOUS; SUBCUTANEOUS
Status: DISCONTINUED | OUTPATIENT
Start: 2022-10-24 | End: 2022-10-25 | Stop reason: HOSPADM

## 2022-10-24 RX ORDER — DEXAMETHASONE SODIUM PHOSPHATE 4 MG/ML
4 INJECTION, SOLUTION INTRA-ARTICULAR; INTRALESIONAL; INTRAMUSCULAR; INTRAVENOUS; SOFT TISSUE EVERY 6 HOURS
Status: DISCONTINUED | OUTPATIENT
Start: 2022-10-24 | End: 2022-10-25 | Stop reason: HOSPADM

## 2022-10-24 RX ORDER — NALOXONE HYDROCHLORIDE 0.4 MG/ML
0.2 INJECTION, SOLUTION INTRAMUSCULAR; INTRAVENOUS; SUBCUTANEOUS
Status: DISCONTINUED | OUTPATIENT
Start: 2022-10-24 | End: 2022-10-25 | Stop reason: HOSPADM

## 2022-10-24 RX ORDER — DEXAMETHASONE SODIUM PHOSPHATE 4 MG/ML
4 INJECTION, SOLUTION INTRA-ARTICULAR; INTRALESIONAL; INTRAMUSCULAR; INTRAVENOUS; SOFT TISSUE EVERY 12 HOURS
Status: DISCONTINUED | OUTPATIENT
Start: 2022-10-29 | End: 2022-10-25 | Stop reason: HOSPADM

## 2022-10-24 RX ORDER — DEXAMETHASONE SODIUM PHOSPHATE 4 MG/ML
4 INJECTION, SOLUTION INTRA-ARTICULAR; INTRALESIONAL; INTRAMUSCULAR; INTRAVENOUS; SOFT TISSUE EVERY 8 HOURS
Status: DISCONTINUED | OUTPATIENT
Start: 2022-10-26 | End: 2022-10-25 | Stop reason: HOSPADM

## 2022-10-24 RX ORDER — DEXAMETHASONE SODIUM PHOSPHATE 4 MG/ML
2 INJECTION, SOLUTION INTRA-ARTICULAR; INTRALESIONAL; INTRAMUSCULAR; INTRAVENOUS; SOFT TISSUE EVERY 24 HOURS
Status: DISCONTINUED | OUTPATIENT
Start: 2022-11-04 | End: 2022-10-25 | Stop reason: HOSPADM

## 2022-10-24 RX ORDER — DEXAMETHASONE SODIUM PHOSPHATE 4 MG/ML
4 INJECTION, SOLUTION INTRA-ARTICULAR; INTRALESIONAL; INTRAMUSCULAR; INTRAVENOUS; SOFT TISSUE EVERY 24 HOURS
Status: DISCONTINUED | OUTPATIENT
Start: 2022-11-01 | End: 2022-10-25 | Stop reason: HOSPADM

## 2022-10-24 RX ADMIN — GABAPENTIN 300 MG: 300 CAPSULE ORAL at 13:49

## 2022-10-24 RX ADMIN — VENLAFAXINE HYDROCHLORIDE 150 MG: 150 CAPSULE, EXTENDED RELEASE ORAL at 08:23

## 2022-10-24 RX ADMIN — OXYCODONE HYDROCHLORIDE 10 MG: 5 TABLET ORAL at 18:48

## 2022-10-24 RX ADMIN — GABAPENTIN 300 MG: 300 CAPSULE ORAL at 22:34

## 2022-10-24 RX ADMIN — METHOCARBAMOL 750 MG: 750 TABLET ORAL at 06:09

## 2022-10-24 RX ADMIN — GABAPENTIN 300 MG: 300 CAPSULE ORAL at 08:24

## 2022-10-24 RX ADMIN — OXYCODONE HYDROCHLORIDE 5 MG: 5 TABLET ORAL at 10:06

## 2022-10-24 RX ADMIN — OXYCODONE HYDROCHLORIDE 5 MG: 5 TABLET ORAL at 22:34

## 2022-10-24 RX ADMIN — OXYCODONE HYDROCHLORIDE 10 MG: 5 TABLET ORAL at 13:52

## 2022-10-24 RX ADMIN — DEXAMETHASONE SODIUM PHOSPHATE 4 MG: 4 INJECTION, SOLUTION INTRAMUSCULAR; INTRAVENOUS at 07:17

## 2022-10-24 RX ADMIN — OXYCODONE HYDROCHLORIDE 10 MG: 5 TABLET ORAL at 06:09

## 2022-10-24 RX ADMIN — DEXAMETHASONE SODIUM PHOSPHATE 4 MG: 4 INJECTION, SOLUTION INTRAMUSCULAR; INTRAVENOUS at 13:49

## 2022-10-24 RX ADMIN — DEXAMETHASONE SODIUM PHOSPHATE 4 MG: 4 INJECTION, SOLUTION INTRAMUSCULAR; INTRAVENOUS at 18:48

## 2022-10-24 RX ADMIN — DEXAMETHASONE SODIUM PHOSPHATE 4 MG: 4 INJECTION, SOLUTION INTRAMUSCULAR; INTRAVENOUS at 00:53

## 2022-10-24 RX ADMIN — TRAZODONE HYDROCHLORIDE 100 MG: 100 TABLET ORAL at 22:34

## 2022-10-24 ASSESSMENT — ACTIVITIES OF DAILY LIVING (ADL)
ADLS_ACUITY_SCORE: 37
ADLS_ACUITY_SCORE: 38
ADLS_ACUITY_SCORE: 37
ADLS_ACUITY_SCORE: 37
ADLS_ACUITY_SCORE: 38
ADLS_ACUITY_SCORE: 38
ADLS_ACUITY_SCORE: 37
ADLS_ACUITY_SCORE: 38
ADLS_ACUITY_SCORE: 37
ADLS_ACUITY_SCORE: 37
ADLS_ACUITY_SCORE: 38
ADLS_ACUITY_SCORE: 37

## 2022-10-24 NOTE — PROGRESS NOTES
[facility]    Neurosurgery Progress Note    Date of Service (when I saw the patient): 10/24/2022     Assessment & Plan   52F who is s/p left L3-4 MIS microdiscectomy with Dr. Solis on 10/5/2022. She presented to the ED 10/23/2022 with concerns of preoperative left leg pain and paresthesias. A lumbar MRI was obtained and looks good. She was started on decadron. Today she was seen lying in bed. She continues to report left hip and leg pain to the foot as well as left shin and top of the foot numbness. She states these symptoms are the same as prior to surgery however she had a period of a week after surgery where symptoms had resolved. She denies any overt weakness.     Plan:  -Continue decadron and taper over 2 weeks to off  -Continue pain management at this time  -No plans for further surgical intervention   -Activity as tolerated  -Diet as tolerated  -Appreciate assistance from hospitalist service    I have discussed the following assessment and plan Dr. Solis who is in agreement with initial plan and will follow up with further consultation recommendations.    Tomasa Muniz CNP  [facility] Neurosurgery  [facility]  [address]  [address] [place] [address]    Tel [phone]  Pager [phone]      Interval History   Stable.    Physical Exam   Temp: 98.4  F (36.9  C) Temp src: Oral BP: 105/73 Pulse: 79   Resp: 16 SpO2: 94 % O2 Device: None (Room air)    Vitals:    10/23/22 0811   Weight: 79.4 kg (175 lb)     Vital Signs with Ranges  Temp:  [97.7  F (36.5  C)-99.1  F (37.3  C)] 98.4  F (36.9  C)  Pulse:  [66-99] 79  Resp:  [14-18] 16  BP: (104-129)/(68-83) 105/73  SpO2:  [94 %-99 %] 94 %  No intake/output data recorded.     , Blood pressure 105/73, pulse 79, temperature 98.4  F (36.9  C), temperature source Oral, resp. rate 16, height 1.829 m (6'), weight 79.4 kg (175 lb), last menstrual period 02/21/2007, SpO2 94 %, not currently  breastfeeding.  175 lbs 0 oz  HEENT:  Normocephalic.  PERRLA.   Heart:  No peripheral edema  Lungs:  No SOB  Skin:  Warm and dry, good capillary refill. Incision CDI.  Extremities:  Good radial and dorsalis pedis pulses bilaterally, no edema, cyanosis or clubbing.    NEUROLOGICAL EXAMINATION:   Mental status:  Alert and Oriented x 3, speech is fluent.  Motor:     Hip Flexor:                Right: 5/5  Left:  5/5  Hip Adductor:             Right:  5/5  Left:  5/5  Hip Abductor:             Right:  5/5  Left:  5/5  Gastroc Soleus:        Right:  5/5  Left:  5/5  Tib/Ant:                      Right:  5/5  Left:  5/5  EHL:                     Right:  5/5  Left:  5/5  Sensation:  Decreased left ankle and top of the foot    Medications       dexamethasone  4 mg Intravenous Q6H     gabapentin  300 mg Oral TID     sodium chloride (PF)  3 mL Intracatheter Q8H     venlafaxine  150 mg Oral Daily       Data     CBC RESULTS:   Recent Labs   Lab Test 10/24/22  0659   WBC 7.5   RBC 4.20   HGB 13.5   HCT 39.4   MCV 94   MCH 32.1   MCHC 34.3   RDW 10.8        Basic Metabolic Panel:  Lab Results   Component Value Date     10/24/2022     06/24/2018      Lab Results   Component Value Date    POTASSIUM 4.1 10/24/2022    POTASSIUM 3.8 06/24/2018     Lab Results   Component Value Date    CHLORIDE 106 10/24/2022    CHLORIDE 108 06/24/2018     Lab Results   Component Value Date    ISAAC 9.2 10/24/2022    ISAAC 8.4 06/24/2018     Lab Results   Component Value Date    CO2 21 10/24/2022    CO2 26 06/24/2018     Lab Results   Component Value Date    BUN 19 10/24/2022    BUN 14 06/24/2018     Lab Results   Component Value Date    CR 0.58 10/24/2022    CR 0.74 06/24/2018     Lab Results   Component Value Date     10/24/2022    GLC 79 06/24/2018     INR:  Lab Results   Component Value Date    INR 1.11 07/08/2010    INR 1.05 01/07/2010    INR 0.97 10/24/2009    INR 1.14 09/30/2006    INR 1.10 07/22/2005

## 2022-10-24 NOTE — PROGRESS NOTES
[facility]    Hospitalist Progress Note    Interval History     Patient awake and alert.  No acute events overnight.  Pain better controlled.  Not completely resolved.  Denies any fever, chills or chest pain or any other complaints.    -Data reviewed today: I reviewed all new labs and imaging results over the last 24 hours. I personally reviewed the MRI lumbar spine image(s) showing As above.    Physical Exam   Temp: 98.4  F (36.9  C) Temp src: Oral BP: 105/73 Pulse: 79   Resp: 16 SpO2: 94 % O2 Device: None (Room air)    Vitals:    10/23/22 0811   Weight: 79.4 kg (175 lb)     Vital Signs with Ranges  Temp:  [97.7  F (36.5  C)-99.1  F (37.3  C)] 98.4  F (36.9  C)  Pulse:  [66-99] 79  Resp:  [14-18] 16  BP: (104-129)/(68-83) 105/73  SpO2:  [94 %-99 %] 94 %  No intake/output data recorded.    Physical Exam  Constitutional:       Appearance: Normal appearance.   HENT:      Head: Normocephalic.   Eyes:      Pupils: Pupils are equal, round, and reactive to light.   Cardiovascular:      Rate and Rhythm: Normal rate and regular rhythm.      Pulses: Normal pulses.      Heart sounds: Normal heart sounds.   Pulmonary:      Effort: Pulmonary effort is normal. No respiratory distress.      Breath sounds: Normal breath sounds.   Abdominal:      General: Abdomen is flat. Bowel sounds are normal. There is no distension.      Tenderness: There is no abdominal tenderness. There is no guarding.   Musculoskeletal:         General: Normal range of motion.      Cervical back: Normal range of motion.   Skin:     General: Skin is warm and dry.   Neurological:      General: No focal deficit present.      Comments: Numbness from the lower half of her left leg and left foot on the dorsum.  3 x 5 strength in left lower extremity.  Pain radiating from her left hip radiating down the lateral side of the dorsum of her left leg and foot.   Psychiatric:         Mood and Affect: Mood normal.           Medications        dexamethasone  4 mg Intravenous Q6H     gabapentin  300 mg Oral TID     sodium chloride (PF)  3 mL Intracatheter Q8H     venlafaxine  150 mg Oral Daily       Data   Recent Labs   Lab 10/24/22  0659 10/23/22  0812   WBC 7.5 5.8   HGB 13.5 13.5   MCV 94 95    226    137   POTASSIUM 4.1 4.6   CHLORIDE 106 105   CO2 21 25   BUN 19 18   CR 0.58 0.62   ANIONGAP 10 7   ISAAC 9.2 8.6   * 100*       No results found for this or any previous visit (from the past 24 hour(s)).      Assessment & Plan      Jennifer Reyes is a 52 year old female admitted on 10/23/2022. She presented to the ED via EMS with c/o back and left hip pain with numbness on top left foot up to left shin. Patient s/p L3-L4 discectomy on October 7th. Patient reports she was pain free for one week, but then started to have atraumatic back pain a week ago and numbness 3-4 days ago that has worsened over time. Neurosurgery contacted by ED physician recs: decadron 10 mg IV and lumbar spine MRI done.        Postoperative back pain  Left leg numbness  S/P L3-L4 discectomy    Displacement of lumbar intervertebral disc   Lumbar disc herniation with myelopathy  -consult neurosurgery.  Appreciate recommendations.  Received a one-time dose of IV Decadron 10 mg in the ER.  Currently on iv dexamethasone 4 mg every 6 hours.  Pain marginally improved compared to yesterday.  Await neurosurgery recommendations for today  -PT/OT   -fall precautions  -pain control: resume home robaxin, oxycodone, gabapentin, dilaudid   -neuro vascular checks q shift   -lumbar spine MRI: When compared to 10/03/2022, the patient has intervally undergone a left laminotomy at L3-L4 for resection of the previously seen left subarticular disc extrusion. The extruded disc material has been removed and previous compression upon the   traversing left L4 nerve root has also improved. There is persistent circumferential enhancement surrounding the traversing left L4 nerve root which  may now represent granulation tissue.   2.  There is a rim-enhancing dorsal midline fluid collection at the L3-L4 level which probably represents a postoperative seroma. Sterility of this collection is not assessed on this examination.     Depression/Anxiety   -resume home Effexor      Hx of MI    Idiopathic cardiomyopathy  -patient not on any medication     Anemia    Thrombocytopenia   -on CBC Hgb and Plt stable   -monitor         Diet: Regular Diet Adult    DVT Prophylaxis: Pneumatic Compression Devices  Miles Catheter: Not present  Central Lines: None  Cardiac Monitoring: None  Code Status: Full Code      Brenda Montez MD, MD  [phone](p)

## 2022-10-24 NOTE — PLAN OF CARE
"Patient A/Ox4, VSS,RA. Patient reported of sadness knowing her Mom in [facility] is also currently in the hospital, suffered with stroke. Stated I'd like to go home and see my mother\". Emotional support and active listening provided.  Patient report of oxycodone helps with pain in the lower back  that radiate too the left leg. Stayed lying in be most of the day. With good intake and output    I have notice this morning patient face was reddened down to her neck- I asked if what's going on and if she feel itch. She said, no, I get redness when I am stressed.   I went back around noon, redness was gone.   Went back this evening,  was in the room and I noticed the redness all over he face again down to her neck. Patient reported again that she is stressed about her mom.     "

## 2022-10-24 NOTE — TELEPHONE ENCOUNTER
Patient sent play140 message inquiring about her Attending Physician Statement with [employer] and paper work for her daughter's employer (so she can be on leave caring for her mother). Paid family and medical leave form received, completed and signed by Dr Solis. Patient's Attending physician pw completed and signed by Dr. Solis.     Faxed Attending Physician's statement to [employer]  October 24, 2022 to fax number [phone]    Right Fax confirmed at 1010 AM    Claim#[id number]    [employer] forms placed with MA/VF staff to be scanned into chart at .     Patient verified email address where she wants pw from her daughter's empolyer sent.     Emailed to Jennifer at [email or web address].   Paid Family and medical leave form given to VF/MA team to be scanned into patient's chart.

## 2022-10-24 NOTE — PROGRESS NOTES
Pt. Alert and oriented x4. VSS on RA. Up A1 gaitbelt walker. Voiding well per bathroom, no BM. Pain managed with PRN robaxin, dilaudid and oxy. CMS intact except left leg numbness.

## 2022-10-25 VITALS
DIASTOLIC BLOOD PRESSURE: 65 MMHG | SYSTOLIC BLOOD PRESSURE: 105 MMHG | OXYGEN SATURATION: 98 % | BODY MASS INDEX: 23.7 KG/M2 | WEIGHT: 175 LBS | RESPIRATION RATE: 18 BRPM | HEIGHT: 72 IN | TEMPERATURE: 98 F | HEART RATE: 72 BPM

## 2022-10-25 PROCEDURE — G0378 HOSPITAL OBSERVATION PER HR: HCPCS

## 2022-10-25 PROCEDURE — 96376 TX/PRO/DX INJ SAME DRUG ADON: CPT

## 2022-10-25 PROCEDURE — 250N000013 HC RX MED GY IP 250 OP 250 PS 637: Performed by: NURSE PRACTITIONER

## 2022-10-25 PROCEDURE — 99217 PR OBSERVATION CARE DISCHARGE: CPT | Performed by: HOSPITALIST

## 2022-10-25 PROCEDURE — 250N000011 HC RX IP 250 OP 636: Performed by: NURSE PRACTITIONER

## 2022-10-25 RX ORDER — OXYCODONE HYDROCHLORIDE 5 MG/1
5-10 TABLET ORAL EVERY 4 HOURS PRN
Qty: 40 TABLET | Refills: 0 | Status: SHIPPED | OUTPATIENT
Start: 2022-10-25 | End: 2022-10-27

## 2022-10-25 RX ORDER — DEXAMETHASONE 4 MG/1
TABLET ORAL
Qty: 40 TABLET | Refills: 0 | Status: SHIPPED | OUTPATIENT
Start: 2022-10-25 | End: 2023-03-22

## 2022-10-25 RX ORDER — AMOXICILLIN 250 MG
1 CAPSULE ORAL DAILY PRN
Qty: 60 TABLET | Refills: 1 | Status: SHIPPED | OUTPATIENT
Start: 2022-10-25 | End: 2023-12-12

## 2022-10-25 RX ORDER — GABAPENTIN 300 MG/1
300 CAPSULE ORAL 3 TIMES DAILY
Qty: 90 CAPSULE | Refills: 1 | Status: SHIPPED | OUTPATIENT
Start: 2022-10-25 | End: 2023-03-22

## 2022-10-25 RX ADMIN — GABAPENTIN 300 MG: 300 CAPSULE ORAL at 09:03

## 2022-10-25 RX ADMIN — VENLAFAXINE HYDROCHLORIDE 150 MG: 150 CAPSULE, EXTENDED RELEASE ORAL at 09:03

## 2022-10-25 RX ADMIN — OXYCODONE HYDROCHLORIDE 10 MG: 5 TABLET ORAL at 06:43

## 2022-10-25 RX ADMIN — DEXAMETHASONE SODIUM PHOSPHATE 4 MG: 4 INJECTION, SOLUTION INTRAMUSCULAR; INTRAVENOUS at 06:43

## 2022-10-25 RX ADMIN — DEXAMETHASONE SODIUM PHOSPHATE 4 MG: 4 INJECTION, SOLUTION INTRAMUSCULAR; INTRAVENOUS at 02:07

## 2022-10-25 RX ADMIN — GABAPENTIN 300 MG: 300 CAPSULE ORAL at 13:11

## 2022-10-25 RX ADMIN — DEXAMETHASONE SODIUM PHOSPHATE 4 MG: 4 INJECTION, SOLUTION INTRAMUSCULAR; INTRAVENOUS at 13:11

## 2022-10-25 RX ADMIN — OXYCODONE HYDROCHLORIDE 10 MG: 5 TABLET ORAL at 11:29

## 2022-10-25 ASSESSMENT — ACTIVITIES OF DAILY LIVING (ADL)
ADLS_ACUITY_SCORE: 38

## 2022-10-25 NOTE — PROGRESS NOTES
Alert and oriented X4.VSS on RA. Up A-1 gaitbelt walker.Voiding well per bathroom, no BM. Pain managed with PRN oxy. Left leg still has numbness. Awaiting home discharge

## 2022-10-25 NOTE — DISCHARGE SUMMARY
[facility]    Discharge Summary  Hospitalist    Date of Admission:  10/23/2022  Date of Discharge:  10/25/2022    Discharge Diagnoses      Postoperative back pain  Left leg numbness  S/P lumbar microdiscectomy    History of Present Illness   Jennifer Reyes is an 52 year old female who presented with left leg pain and paresthesias with weakness after a recent left L3-L4 microdiscectomy on 10/5/2022    Hospital Course   Jennifer Reyes was admitted on 10/23/2022.  The following problems were addressed during her hospitalization:    HGB 13.5 13.5   MCV 94 95    226    137   POTASSIUM 4.1 4.6   CHLORIDE 106 105   CO2 21 25   BUN 19 18   CR 0.58 0.62   ANIONGAP 10 7   ISAAC 9.2 8.6   * 100*         No results found for this or any previous visit (from the past 24 hour(s)).        Assessment & Plan        Jennifer Reyes is a 52 year old female admitted on 10/23/2022. She presented to the ED via EMS with c/o back and left hip pain with numbness on top left foot up to left shin. Patient s/p L3-L4 discectomy on October 7th. Patient reports she was pain free for one week, but then started to have atraumatic back pain a week ago and numbness 3-4 days ago that has worsened over time. Neurosurgery contacted by ED physician clemente: decadron 10 mg IV and lumbar spine MRI done.        Postoperative back pain  Left leg numbness  S/P L3-L4 discectomy    Displacement of lumbar intervertebral disc   Lumbar disc herniation with myelopathy  -consult neurosurgery.  Appreciate recommendations.  Received a one-time dose of IV Decadron 10 mg in the ER.    Was started on IV dexamethasone 4 mg every 6 hours.  Had significant improvement with her pain and weakness although it still there today.  Plan on discharging her on an oral dexamethasone taper with outpatient follow-up with neurosurgery.  -fall precautions  -pain control: resume home robaxin, oxycodone, gabapentin, dilaudid   -neuro  vascular checks q shift   -lumbar spine MRI: When compared to 10/03/2022, the patient has intervally undergone a left laminotomy at L3-L4 for resection of the previously seen left subarticular disc extrusion. The extruded disc material has been removed and previous compression upon the   traversing left L4 nerve root has also improved. There is persistent circumferential enhancement surrounding the traversing left L4 nerve root which may now represent granulation tissue.   2.  There is a rim-enhancing dorsal midline fluid collection at the L3-L4 level which probably represents a postoperative seroma. Sterility of this collection is not assessed on this examination.    Based on imaging findings no indication for acute surgical intervention at this time.     Depression/Anxiety   -resume home Effexor      Hx of MI    Idiopathic cardiomyopathy  -patient not on any medication     Anemia    Thrombocytopenia   -on CBC Hgb and Plt stable   -monitor         Diet: Regular Diet Adult    DVT Prophylaxis: Pneumatic Compression Devices  Miles Catheter: Not present  Central Lines: None  Cardiac Monitoring: None  Code Status: Full Code       Brenda Montez MD, MD  [phone](p)          Brenda Montez MD, MD    Code Status   Full Code       Primary Care Physician   Marni Montemayor    Physical Exam   Temp: 98  F (36.7  C) Temp src: Oral BP: 105/65 Pulse: 72   Resp: 18 SpO2: 98 % O2 Device: None (Room air)    Vitals:    10/23/22 0811   Weight: 79.4 kg (175 lb)     Vital Signs with Ranges  Temp:  [97.9  F (36.6  C)-98.2  F (36.8  C)] 98  F (36.7  C)  Pulse:  [60-74] 72  Resp:  [18] 18  BP: (101-124)/(48-78) 105/65  SpO2:  [98 %-99 %] 98 %  I/O last 3 completed shifts:  In: 2000 [P.O.:2000]  Out: -     Physical Exam  Constitutional:       Appearance: Normal appearance.   HENT:      Head: Normocephalic.   Eyes:      Pupils: Pupils are equal, round, and reactive to light.   Cardiovascular:      Rate and Rhythm: Normal  rate and regular rhythm.      Pulses: Normal pulses.      Heart sounds: Normal heart sounds.   Pulmonary:      Effort: Pulmonary effort is normal. No respiratory distress.      Breath sounds: Normal breath sounds.   Abdominal:      General: Abdomen is flat. Bowel sounds are normal. There is no distension.      Tenderness: There is no abdominal tenderness. There is no guarding.   Musculoskeletal:         General: Normal range of motion.   Skin:     General: Skin is warm and dry.   Neurological:      General: No focal deficit present.      Comments: Left leg weakness and numbness with pain   Psychiatric:         Mood and Affect: Mood normal.           Discharge Disposition   Discharged to home  Condition at discharge: Stable    Consultations This Hospital Stay   NEUROSURGERY IP CONSULT  PHYSICAL THERAPY ADULT IP CONSULT  OCCUPATIONAL THERAPY ADULT IP CONSULT    Time Spent on this Encounter   IBrenda MD, personally saw the patient today and spent greater than 30 minutes discharging this patient.    Discharge Orders      Reason for your hospital stay    Back pain     Follow-up and recommended labs and tests     Follow up with primary care provider, Marni Montemayor, within 7 days for hospital follow- up.  The following labs/tests are recommended: cbc, bmp in 1 week.     Activity    Your activity upon discharge: activity as tolerated     Diet    Follow this diet upon discharge: Orders Placed This Encounter      Regular Diet Adult     Discharge Medications   Current Discharge Medication List      START taking these medications    Details   dexamethasone (DECADRON) 4 MG tablet Take 1 tabs by mouth q6h x 3 days, then 1 tabs q8h x 3 days, then 1 tab q12h x 3 days, then 1/2 tab daily x 3 days.  Qty: 40 tablet, Refills: 0    Associated Diagnoses: S/P lumbar microdiscectomy         CONTINUE these medications which have CHANGED    Details   gabapentin (NEURONTIN) 300 MG capsule Take 1 capsule (300 mg) by  mouth 3 times daily  Qty: 90 capsule, Refills: 1    Associated Diagnoses: S/P lumbar microdiscectomy      oxyCODONE (ROXICODONE) 5 MG tablet Take 1-2 tablets (5-10 mg) by mouth every 4 hours as needed for moderate to severe pain  Qty: 40 tablet, Refills: 0    Associated Diagnoses: S/P lumbar microdiscectomy      senna-docusate (SENOKOT-S/PERICOLACE) 8.6-50 MG tablet Take 1 tablet by mouth daily as needed for constipation  Qty: 60 tablet, Refills: 1    Associated Diagnoses: S/P lumbar microdiscectomy         CONTINUE these medications which have NOT CHANGED    Details   acetaminophen (TYLENOL) 500 MG tablet Take 500 mg by mouth every 6 hours as needed for mild pain      azelastine (ASTELIN) 0.1 % nasal spray Spray 2 sprays into both nostrils 2 times daily  Qty: 30 mL, Refills: 11    Comments: Don't fill now, will call if needed.  Associated Diagnoses: Rhinoconjunctivitis      EPINEPHrine (ANY BX GENERIC EQUIV) 0.3 MG/0.3ML injection 2-pack Inject 0.3 mLs (0.3 mg) into the muscle as needed for anaphylaxis  Qty: 0.6 mL, Refills: 1    Associated Diagnoses: Anaphylactic reaction due to tree nuts and seeds, subsequent encounter      fluticasone (FLONASE) 50 MCG/ACT nasal spray Spray 2 sprays into both nostrils At Bedtime  Qty: 48 g, Refills: 3    Comments: Don't fill now, will call if needed.  Associated Diagnoses: Seasonal allergic rhinitis due to pollen      loratadine (CLARITIN) 10 MG tablet Take 10 mg by mouth daily      methocarbamol (ROBAXIN) 750 MG tablet Take 1 tablet (750 mg) by mouth every 6 hours as needed for muscle spasms  Qty: 40 tablet, Refills: 1    Associated Diagnoses: S/P lumbar microdiscectomy      montelukast (SINGULAIR) 10 MG tablet Take 1 tablet (10 mg) by mouth At Bedtime  Qty: 30 tablet, Refills: 5    Associated Diagnoses: Rhinoconjunctivitis      mupirocin (BACTROBAN) 2 % external cream Apply topically daily as needed for skin burn      olopatadine (PATANOL) 0.1 % ophthalmic solution Apply 1 drop  to eye daily as needed - takes seasonally only      polyethylene glycol (MIRALAX) 17 GM/Dose powder Take 1 capful by mouth daily as needed for constipation      traZODone (DESYREL) 100 MG tablet TAKE 1/2 TO 1 TAB BY MOUTH NIGHTLY AS NEEDED FOR SLEEP  Qty: 90 tablet, Refills: 1    Comments: Don't fill now, will call if needed.  Associated Diagnoses: Major depressive disorder, single episode, severe without psychotic features (H); Persistent insomnia      venlafaxine (EFFEXOR XR) 150 MG 24 hr capsule Take 1 capsule (150 mg) by mouth daily  Qty: 90 capsule, Refills: 3    Associated Diagnoses: Major depressive disorder, single episode, severe without psychotic features (H)           Allergies   Allergies   Allergen Reactions     Banana Hives     Same with kiwi     Cats      Morphine Hives     Peanuts [Nuts]      Patient states she is allergic to all tree nuts.     Rocephin [Ceftriaxone]      Data   Recent Labs   Lab Test 10/24/22  0659 10/23/22  0812 10/05/22  0859   WBC 7.5 5.8 9.9   HGB 13.5 13.5 12.6   MCV 94 95 95    226 226      Recent Labs   Lab Test 10/24/22  0659 10/23/22  0812 10/06/22  0802 10/05/22  2221 10/05/22  0859    137  --   --  139   POTASSIUM 4.1 4.6 3.6  --  4.3   CHLORIDE 106 105  --   --  108   CO2 21 25  --   --  24   BUN 19 18  --   --  21   CR 0.58 0.62  --   --  0.59   ANIONGAP 10 7  --   --  7   ISAAC 9.2 8.6  --   --  8.9   * 100*  --  110* 102*         Results for orders placed or performed during the hospital encounter of 10/23/22   Lumbar spine MRI w & w/o contrast - surgery <10yrs    Narrative    EXAM: MR LUMBAR SPINE W/O and W CONTRAST  LOCATION: [facility]  DATE/TIME: 10/23/2022 9:28 AM    INDICATION: L3 4 microdiscectomy 10 5 22 and now severe low back pain and numbness left leg with weakness left foot  COMPARISON: MRI lumbar spine 10/03/2022  CONTRAST: 7mL gadavist  TECHNIQUE: Routine Lumbar Spine MRI without and with IV  contrast.    FINDINGS:   Nomenclature is based on 5 lumbar type vertebral bodies. Normal vertebral body heights. Redemonstration of the interbody spacers at L4-L5 and L5-S1. Unchanged appearance of the L5 laminectomy. No suspicious bone marrow signal abnormality. Alignment is   maintained. Normal distal spinal cord and cauda equina with conus medullaris at L1. No extraspinal abnormality. Unremarkable visualized bony pelvis.    T12-L1: Normal disc height and signal. No herniation. Normal facets. No spinal canal or neural foraminal stenosis.     L1-L2: Normal disc height and signal. No herniation. Normal facets. No spinal canal or neural foraminal stenosis.    L2-L3: Normal disc height and signal. No herniation. Normal facets. No spinal canal or neural foraminal stenosis.     L3-L4: Unchanged mild loss of disc height and T2-weighted signal in the disc. Interval left laminotomy for resection of the previously seen left subarticular disc extrusion. Previous mass effect/compression upon the traversing left L4 nerve root appears   to have resolved. Unchanged mild bilateral facet arthropathy. No spinal canal stenosis. No neural foraminal stenosis. There is a rim-enhancing dorsal midline fluid collection measuring 2.9 cm in craniocaudal dimension and 1.7 cm in the transverse   dimension at the L3-L4 level. Additionally, there is enhancement along the left laminotomy tract extending along the left lateral aspect of the spinal canal and circumferential enhancement surrounding the traversing left L4 nerve root.    L4-L5: Unchanged interbody spacer. No spinal canal stenosis. No facet arthropathy. No neural foraminal stenosis.    L5-S1: Unchanged interbody spacer. Unchanged laminectomy. Unchanged mild bilateral facet arthropathy. No spinal canal or neural foraminal stenosis.      Impression    IMPRESSION:  1.  When compared to 10/03/2022, the patient has intervally undergone a left laminotomy at L3-L4 for resection of the  previously seen left subarticular disc extrusion. The extruded disc material has been removed and previous compression upon the   traversing left L4 nerve root has also improved. There is persistent circumferential enhancement surrounding the traversing left L4 nerve root which may now represent granulation tissue.    2.  There is a rim-enhancing dorsal midline fluid collection at the L3-L4 level which probably represents a postoperative seroma. Sterility of this collection is not assessed on this examination.

## 2022-10-25 NOTE — PROGRESS NOTES
Neurosurgery progress note    Ms. Reyes states she is feeling better today, having less pain in her left leg, feeling stronger in her left foot and improved with walking.  She feels like she is ready to discharge home.  She also wants to get ready for a trip to [place] to care for her mother who recently had a stroke.    Exam    Alert and oriented no acute distress  Bilateral lower extremities with 5-5 strength with hip flexion, knee flexion extension, and ankle dorsiflexion plantarflexion    Assessment    3 weeks status post left L3-4 MIS microdiscectomy, MRI demonstrating granulation tissue, likely postoperative scar tissue and inflammation as cause of her symptoms, seems to be responding well to steroid medication and muscle relaxant and oxycodone.    Plan    Okay to discharge home from neurosurgery perspective  We will prescribe Decadron taper orally for 2 weeks off  Will prescribe methocarbamol and oxycodone  Follow-up as scheduled with neurosurgery clinic  Continue postoperative restrictions of avoiding bending lifting and twisting and not lifting more than 10 pounds.

## 2022-10-25 NOTE — PLAN OF CARE
Occupational Therapy Discharge Summary    Reason for therapy discharge:    Discharged to home.    Progress towards therapy goal(s). See goals on Care Plan in [facility] electronic health record for goal details.  Goals not met.  Barriers to achieving goals:   discharge from facility.    Therapy recommendation(s):    No further therapy is recommended.

## 2022-10-25 NOTE — PLAN OF CARE
Physical Therapy Discharge Summary    Reason for therapy discharge:    Discharged to home.    Progress towards therapy goal(s). See goals on Care Plan in [facility] electronic health record for goal details.  Goals not met.  Barriers to achieving goals:   discharge from facility.    Therapy recommendation(s):    No further therapy is recommended.

## 2022-10-26 NOTE — TELEPHONE ENCOUNTER
Pt sent in [employer] message -    Pt states she needs to go to [place] from 10/28-11/25 to care for her mom. She is asking for virtual appts and for an oxycodone refill to last her.     Pt educated that we would have to verify with the provider for a virtual appt and that we are unable to give an oxycodone refill for more than 40 tabs.     Pt to follow-up with us on how she would like to proceed.

## 2022-10-27 RX ORDER — OXYCODONE HYDROCHLORIDE 5 MG/1
5-10 TABLET ORAL EVERY 4 HOURS PRN
Qty: 40 TABLET | Refills: 0 | Status: SHIPPED | OUTPATIENT
Start: 2022-10-28 | End: 2022-11-21

## 2022-10-27 NOTE — TELEPHONE ENCOUNTER
Patient requesting refill of Oxycodone.     DOS: 10/5/22  Surgeon: Dr. Solis  Procedure: Left Lumbar 3 to Lumbar 4 minimally invasive microdiscectomy  Weeks Post op: 3  Last refilled: 10/25/22 > pt will be out of medication on 10/28    Pain assessment completed via My Chart. Please see encounter for further details. Refill request will be forwarded to care team.

## 2022-10-28 ENCOUNTER — DOCUMENTATION ONLY (OUTPATIENT)
Dept: NEUROSURGERY | Facility: CLINIC | Age: 52
End: 2022-10-28

## 2022-10-28 NOTE — PROGRESS NOTES
10/28/2022  8:28 AM  Writer scanned in blank [employer] workability form and handed to nursing staff for completion.

## 2022-10-28 NOTE — PROGRESS NOTES
Forms completed and signed by provider  Faxed forms October 28, 2022 to fax number [phone]    Right Fax confirmed at 3:19 PM    Given to MA/VF team at  to scan completed copy into chart.

## 2022-11-16 ENCOUNTER — MYC MEDICAL ADVICE (OUTPATIENT)
Dept: NEUROSURGERY | Facility: CLINIC | Age: 52
End: 2022-11-16

## 2022-11-16 NOTE — TELEPHONE ENCOUNTER
Patient cancelled 6 week follow up appointment, states she will call to re-schedule once back in the [place].

## 2022-11-19 DIAGNOSIS — Z98.890 S/P LUMBAR MICRODISCECTOMY: ICD-10-CM

## 2022-11-20 ENCOUNTER — HEALTH MAINTENANCE LETTER (OUTPATIENT)
Age: 52
End: 2022-11-20

## 2022-11-21 RX ORDER — OXYCODONE HYDROCHLORIDE 5 MG/1
5-10 TABLET ORAL EVERY 4 HOURS PRN
Qty: 40 TABLET | Refills: 0 | Status: SHIPPED | OUTPATIENT
Start: 2022-11-21 | End: 2022-11-28

## 2022-11-28 ENCOUNTER — MYC MEDICAL ADVICE (OUTPATIENT)
Dept: NEUROSURGERY | Facility: CLINIC | Age: 52
End: 2022-11-28

## 2022-11-28 DIAGNOSIS — Z98.890 S/P LUMBAR MICRODISCECTOMY: ICD-10-CM

## 2022-11-28 RX ORDER — METHOCARBAMOL 750 MG/1
750 TABLET, FILM COATED ORAL EVERY 6 HOURS PRN
Qty: 40 TABLET | Refills: 1 | Status: SHIPPED | OUTPATIENT
Start: 2022-11-28 | End: 2023-03-22

## 2022-11-28 RX ORDER — OXYCODONE HYDROCHLORIDE 5 MG/1
5-10 TABLET ORAL EVERY 4 HOURS PRN
Qty: 40 TABLET | Refills: 0 | Status: SHIPPED | OUTPATIENT
Start: 2022-11-28 | End: 2022-12-02

## 2022-11-28 NOTE — TELEPHONE ENCOUNTER
Patient requesting refill of Oxycodone and robaxin.      DOS: 10/5/22  Surgeon: Dr. Solis  Procedure: Left Lumbar 3 to Lumbar 4 minimally invasive microdiscectomy  Weeks Post op: 8  Last refilled: 11/21 (oxycodone), 10/17 (robaxin)    Pt educated of weaning needs. Encouraged to use tylenol, NSAIDs and ice/heat.     Pain assessment completed via My Chart. Please see encounter for further details. Refill request will be forwarded to care team.

## 2022-12-02 ENCOUNTER — OFFICE VISIT (OUTPATIENT)
Dept: NEUROSURGERY | Facility: CLINIC | Age: 52
End: 2022-12-02
Payer: COMMERCIAL

## 2022-12-02 VITALS
DIASTOLIC BLOOD PRESSURE: 78 MMHG | SYSTOLIC BLOOD PRESSURE: 116 MMHG | HEART RATE: 83 BPM | HEIGHT: 72 IN | WEIGHT: 175 LBS | BODY MASS INDEX: 23.7 KG/M2 | OXYGEN SATURATION: 100 %

## 2022-12-02 DIAGNOSIS — Z98.1 S/P LUMBAR SPINAL FUSION: ICD-10-CM

## 2022-12-02 DIAGNOSIS — Z98.890 S/P LUMBAR MICRODISCECTOMY: Primary | ICD-10-CM

## 2022-12-02 PROCEDURE — 99024 POSTOP FOLLOW-UP VISIT: CPT | Performed by: PHYSICIAN ASSISTANT

## 2022-12-02 RX ORDER — OXYCODONE HYDROCHLORIDE 5 MG/1
5-10 TABLET ORAL EVERY 4 HOURS PRN
Qty: 40 TABLET | Refills: 0 | Status: SHIPPED | OUTPATIENT
Start: 2022-12-02 | End: 2022-12-08

## 2022-12-02 ASSESSMENT — PAIN SCALES - GENERAL: PAINLEVEL: MODERATE PAIN (5)

## 2022-12-02 NOTE — PROGRESS NOTES
Neurosurgery follow-up    Ms. Reyes is a 52-year-old female who is 2 months status post left L3-4 minimally invasive microdiscectomy.  She states her left leg pain is resolved, she continues to have back pain.  She has had a difficult postoperative.  Was briefly readmitted to the hospital for pain control, she was also head to fly to [place] to visit her dying mother, and then flew back, these were 20-hour flights, and this significantly irritated her low back as well.  She has not yet begun physical therapy.  She did take a steroid taper after being in the hospital which helped somewhat.  She continues to use muscle relaxant and Percocet for pain control.    Exam    B/P: 116/78, T: Data Unavailable, P: 83, R: Data Unavailable     Alert and oriented no acute distress  Bilateral lower extremities with 5/5 strength  Lumbar spine nontender to palpation  Gait is normal  Incision well-healed    Imaging    Lumbar MRI performed on 10/23/202 demonstrated    IMPRESSION:  1.  When compared to 10/03/2022, the patient has intervally undergone a left laminotomy at L3-L4 for resection of the previously seen left subarticular disc extrusion. The extruded disc material has been removed and previous compression upon the   traversing left L4 nerve root has also improved. There is persistent circumferential enhancement surrounding the traversing left L4 nerve root which may now represent granulation tissue.     2.  There is a rim-enhancing dorsal midline fluid collection at the L3-L4 level which probably represents a postoperative seroma. Sterility of this collection is not assessed on this examination.    Assessment    Status post left L3-4 microdiscectomy.      Plan    I recommend the patient begin physical therapy at this time and follow-up in 4 weeks as scheduled.

## 2022-12-02 NOTE — NURSING NOTE
Jennifer Reyes is a 52 year old female who presents for:  Chief Complaint   Patient presents with     RECHECK     Left lumber 3-4 minimally invasive microdiscectomy. Hard to turn at night.        Initial Vitals:  /78   Pulse 83   Ht 6' (1.829 m)   Wt 175 lb (79.4 kg)   LMP 02/21/2007 (Exact Date)   SpO2 100%   BMI 23.73 kg/m   Estimated body mass index is 23.73 kg/m  as calculated from the following:    Height as of this encounter: 6' (1.829 m).    Weight as of this encounter: 175 lb (79.4 kg).. Body surface area is 2.01 meters squared. BP completed using cuff size: regular  Moderate Pain (5)        Cele Cali

## 2022-12-02 NOTE — LETTER
12/2/2022         RE: Jennifer Reyes  [id number] [facility] [address]        Dear Colleague,    Thank you for referring your patient, Jennifer Reyes, to the [facility]. Please see a copy of my visit note below.    Neurosurgery follow-up    Ms. Reyes is a 52-year-old female who is 2 months status post left L3-4 minimally invasive microdiscectomy.  She states her left leg pain is resolved, she continues to have back pain.  She has had a difficult postoperative.  Was briefly readmitted to the hospital for pain control, she was also head to fly to [place] to visit her dying mother, and then flew back, these were 20-hour flights, and this significantly irritated her low back as well.  She has not yet begun physical therapy.  She did take a steroid taper after being in the hospital which helped somewhat.  She continues to use muscle relaxant and Percocet for pain control.    Exam    B/P: 116/78, T: Data Unavailable, P: 83, R: Data Unavailable     Alert and oriented no acute distress  Bilateral lower extremities with 5/5 strength  Lumbar spine nontender to palpation  Gait is normal  Incision well-healed    Imaging    Lumbar MRI performed on 10/23/202 demonstrated    IMPRESSION:  1.  When compared to 10/03/2022, the patient has intervally undergone a left laminotomy at L3-L4 for resection of the previously seen left subarticular disc extrusion. The extruded disc material has been removed and previous compression upon the   traversing left L4 nerve root has also improved. There is persistent circumferential enhancement surrounding the traversing left L4 nerve root which may now represent granulation tissue.     2.  There is a rim-enhancing dorsal midline fluid collection at the L3-L4 level which probably represents a postoperative seroma. Sterility of this collection is not assessed on this examination.    Assessment    Status post left L3-4  microdiscectomy.      Plan    I recommend the patient begin physical therapy at this time and follow-up in 4 weeks as scheduled.              Again, thank you for allowing me to participate in the care of your patient.        Sincerely,        Melvin Miller PA-C

## 2022-12-07 ENCOUNTER — MYC MEDICAL ADVICE (OUTPATIENT)
Dept: NEUROSURGERY | Facility: CLINIC | Age: 52
End: 2022-12-07

## 2022-12-07 DIAGNOSIS — Z98.890 S/P LUMBAR MICRODISCECTOMY: ICD-10-CM

## 2022-12-08 RX ORDER — OXYCODONE HYDROCHLORIDE 5 MG/1
5-10 TABLET ORAL EVERY 6 HOURS PRN
Qty: 40 TABLET | Refills: 0 | Status: SHIPPED | OUTPATIENT
Start: 2022-12-08 | End: 2022-12-15

## 2022-12-08 NOTE — TELEPHONE ENCOUNTER
Patient requesting refill of oxycodone.     DOS: 10/5/22  Surgeon: Dr. Solis         Procedure: Left L3-4 minimally invasive microdiscectomy  Weeks Post op: 9 weeks 1 day  Last refilled: 12/2 #40    Updated patient via CTMGt about 12 week refill end date and weaning.     Patient scheduled to begin physical therapy.    Pain assessment completed via My Chart. Please see encounter for further details. Refill request will be forwarded to care team.

## 2022-12-08 NOTE — TELEPHONE ENCOUNTER
Pain management referral placed per care team. Dosage also decreased by care team. Update sent to patient via [employer].

## 2022-12-15 ENCOUNTER — MYC REFILL (OUTPATIENT)
Dept: NEUROSURGERY | Facility: CLINIC | Age: 52
End: 2022-12-15

## 2022-12-15 DIAGNOSIS — Z98.890 S/P LUMBAR MICRODISCECTOMY: ICD-10-CM

## 2022-12-15 RX ORDER — OXYCODONE HYDROCHLORIDE 5 MG/1
5-10 TABLET ORAL EVERY 6 HOURS PRN
Qty: 40 TABLET | Refills: 0 | Status: SHIPPED | OUTPATIENT
Start: 2022-12-15 | End: 2023-03-22

## 2022-12-15 NOTE — TELEPHONE ENCOUNTER
New encounter created for new refill request. Please see previous MyChart encounter for patient's pain assessment.     Patient requesting refill of oxycodone.     DOS: 10/5/22  Surgeon: Dr. Solis  Procedure:  Left L3-4 minimally invasive microdiscectomy  Weeks Post op: 10 weeks 1 day  Last refilled: 12/8/22 #40    Per patient she is scheduled with pain management on 1/3/23. Patient reports via [employer] this is her last refill request and her PCP will bridge until the pain clinic appointment if needed.      Patient had telephone visit on 12/13/22 with PCP in [facility] for urgent pain management referral.     Pain assessment completed via BPTt in separate enocunter. Please see encounter for further details. Refill request will be forwarded to care team.

## 2023-01-07 DIAGNOSIS — F32.2 MAJOR DEPRESSIVE DISORDER, SINGLE EPISODE, SEVERE WITHOUT PSYCHOTIC FEATURES (H): ICD-10-CM

## 2023-01-07 DIAGNOSIS — G47.00 PERSISTENT INSOMNIA: ICD-10-CM

## 2023-01-10 NOTE — TELEPHONE ENCOUNTER
Pending Prescriptions:                       Disp   Refills    traZODone (DESYREL) 100 MG tablet [Pharmac*90 tab*2        Sig: TAKE 1/2 TO 1 TABLET BY MOUTH NIGHTLY AS NEEDED FOR           SLEEP    Routing refill request to provider for review/approval because:  Drug interaction warning

## 2023-01-12 RX ORDER — TRAZODONE HYDROCHLORIDE 100 MG/1
TABLET ORAL
Qty: 90 TABLET | Refills: 0 | Status: SHIPPED | OUTPATIENT
Start: 2023-01-12 | End: 2023-03-22

## 2023-03-22 ENCOUNTER — VIRTUAL VISIT (OUTPATIENT)
Dept: FAMILY MEDICINE | Facility: CLINIC | Age: 53
End: 2023-03-22
Payer: COMMERCIAL

## 2023-03-22 DIAGNOSIS — G47.00 PERSISTENT INSOMNIA: ICD-10-CM

## 2023-03-22 DIAGNOSIS — F32.2 MAJOR DEPRESSIVE DISORDER, SINGLE EPISODE, SEVERE WITHOUT PSYCHOTIC FEATURES (H): Primary | ICD-10-CM

## 2023-03-22 DIAGNOSIS — F43.21 GRIEF: ICD-10-CM

## 2023-03-22 PROCEDURE — 99213 OFFICE O/P EST LOW 20 MIN: CPT | Mod: VID | Performed by: FAMILY MEDICINE

## 2023-03-22 RX ORDER — ONDANSETRON 4 MG/1
4 TABLET, ORALLY DISINTEGRATING ORAL EVERY 8 HOURS PRN
COMMUNITY
End: 2023-03-22

## 2023-03-22 RX ORDER — TRAZODONE HYDROCHLORIDE 100 MG/1
TABLET ORAL
Qty: 90 TABLET | Refills: 3 | Status: SHIPPED | OUTPATIENT
Start: 2023-03-22 | End: 2023-12-12

## 2023-03-22 RX ORDER — EPINEPHRINE 0.3 MG/.3ML
0.3 INJECTION SUBCUTANEOUS
COMMUNITY
Start: 2023-01-02

## 2023-03-22 RX ORDER — HYDROXYZINE PAMOATE 25 MG/1
CAPSULE ORAL
COMMUNITY
Start: 2023-02-27 | End: 2023-12-12

## 2023-03-22 RX ORDER — HYDROXYZINE HYDROCHLORIDE 25 MG/1
25 TABLET, FILM COATED ORAL 3 TIMES DAILY PRN
COMMUNITY
End: 2023-12-12

## 2023-03-22 RX ORDER — ONDANSETRON 4 MG/1
4 TABLET, ORALLY DISINTEGRATING ORAL
COMMUNITY
Start: 2023-03-20 | End: 2024-05-06

## 2023-03-22 RX ORDER — HYDROCODONE BITARTRATE AND ACETAMINOPHEN 5; 325 MG/1; MG/1
1 TABLET ORAL EVERY 6 HOURS PRN
COMMUNITY
End: 2024-05-06

## 2023-03-22 RX ORDER — VENLAFAXINE HYDROCHLORIDE 150 MG/1
150 CAPSULE, EXTENDED RELEASE ORAL DAILY
Qty: 90 CAPSULE | Refills: 3 | Status: SHIPPED | OUTPATIENT
Start: 2023-03-22 | End: 2023-05-30 | Stop reason: DRUGHIGH

## 2023-03-22 ASSESSMENT — PATIENT HEALTH QUESTIONNAIRE - PHQ9
10. IF YOU CHECKED OFF ANY PROBLEMS, HOW DIFFICULT HAVE THESE PROBLEMS MADE IT FOR YOU TO DO YOUR WORK, TAKE CARE OF THINGS AT HOME, OR GET ALONG WITH OTHER PEOPLE: SOMEWHAT DIFFICULT
SUM OF ALL RESPONSES TO PHQ QUESTIONS 1-9: 4
SUM OF ALL RESPONSES TO PHQ QUESTIONS 1-9: 4

## 2023-03-22 ASSESSMENT — ANXIETY QUESTIONNAIRES
6. BECOMING EASILY ANNOYED OR IRRITABLE: NOT AT ALL
3. WORRYING TOO MUCH ABOUT DIFFERENT THINGS: SEVERAL DAYS
7. FEELING AFRAID AS IF SOMETHING AWFUL MIGHT HAPPEN: SEVERAL DAYS
4. TROUBLE RELAXING: SEVERAL DAYS
GAD7 TOTAL SCORE: 6
2. NOT BEING ABLE TO STOP OR CONTROL WORRYING: SEVERAL DAYS
7. FEELING AFRAID AS IF SOMETHING AWFUL MIGHT HAPPEN: SEVERAL DAYS
IF YOU CHECKED OFF ANY PROBLEMS ON THIS QUESTIONNAIRE, HOW DIFFICULT HAVE THESE PROBLEMS MADE IT FOR YOU TO DO YOUR WORK, TAKE CARE OF THINGS AT HOME, OR GET ALONG WITH OTHER PEOPLE: SOMEWHAT DIFFICULT
GAD7 TOTAL SCORE: 6
5. BEING SO RESTLESS THAT IT IS HARD TO SIT STILL: SEVERAL DAYS
GAD7 TOTAL SCORE: 6
1. FEELING NERVOUS, ANXIOUS, OR ON EDGE: SEVERAL DAYS
8. IF YOU CHECKED OFF ANY PROBLEMS, HOW DIFFICULT HAVE THESE MADE IT FOR YOU TO DO YOUR WORK, TAKE CARE OF THINGS AT HOME, OR GET ALONG WITH OTHER PEOPLE?: SOMEWHAT DIFFICULT

## 2023-03-22 NOTE — PROGRESS NOTES
Jennifer is a 52 year old who is being evaluated via a billable video visit.      Video visit performed in lieu of an in-person visit due to current concerns regarding social distancing and keeping people safe.  Physician and patient agreed this was appropriate for concerns addressed.     How would you like to obtain your AVS? MyChart  If the video visit is dropped, the invitation should be resent by: Text to cell phone: [phone]  Will anyone else be joining your video visit? No      Assessment & Plan       ICD-10-CM    1. Major depressive disorder, single episode, severe without psychotic features (H)  F32.2 traZODone (DESYREL) 100 MG tablet     venlafaxine (EFFEXOR XR) 150 MG 24 hr capsule     Adult Mental Health  Referral      2. Grief  F43.21 Adult Mental Health  Referral      3. Persistent insomnia  G47.00 traZODone (DESYREL) 100 MG tablet         I refilled her medications. We discussed getting her set up with a counselor/therapist.   Will follow up in 2-3 months to review if this has been helpful or needs a change in medication, sooner prn.     14 minutes spent by me on the date of the encounter doing chart review, history and exam, documentation and further activities per the note       Marni Montemayor MD  [facility]    Nisha Rodriguez is a 52 year old, presenting for the following health issues:  Recheck Medication  No flowsheet data found.  History of Present Illness       Mental Health Follow-up:  Patient presents to follow-up on Anxiety.    Patient's anxiety since last visit has been:  Worse  The patient is not having other symptoms associated with anxiety.  Any significant life events: grief or loss and other  Patient is feeling anxious or having panic attacks.  Patient has no concerns about alcohol or drug use.    She eats 2-3 servings of fruits and vegetables daily.She consumes 0 sweetened beverage(s) daily.She exercises with enough effort  to increase her heart rate 30 to 60 minutes per day.  She exercises with enough effort to increase her heart rate 3 or less days per week.   She is taking medications regularly.    Today's PHQ-9         PHQ-9 Total Score: 4    PHQ-9 Q9 Thoughts of better off dead/self-harm past 2 weeks :   Not at all    How difficult have these problems made it for you to do your work, take care of things at home, or get along with other people: Somewhat difficult  Today's MIKE-7 Score: 6     She has been through quite a lot in the past few months.  In November her mother  of a stroke.  This has been hard on her. They were close.   Her brother was found alive.   She is on Trazodone and Venlafaxine.     She has allergies, She has hydroxyzine to use prn, it is so-so.   She is not using astelin.     Review of Systems   Constitutional, HEENT, cardiovascular, pulmonary, gi and gu systems are negative, except as otherwise noted.      Objective           Vitals:  No vitals were obtained today due to virtual visit.    Physical Exam   GENERAL: Healthy, alert and no distress  EYES: Eyes grossly normal to inspection.  No discharge or erythema, or obvious scleral/conjunctival abnormalities.  RESP: No audible wheeze, cough, or visible cyanosis.  No visible retractions or increased work of breathing.    SKIN: Visible skin clear. No significant rash, abnormal pigmentation or lesions.  NEURO: Cranial nerves grossly intact.  Mentation and speech appropriate for age.  PSYCH: Mentation appears normal, affect normal/bright, judgement and insight intact, normal speech and appearance well-groomed.          Video-Visit Details    Type of service:  Video Visit     Originating Location (pt. Location): Home  Distant Location (provider location):  On-site  Platform used for Video Visit: Mimvi

## 2023-03-31 ENCOUNTER — VIRTUAL VISIT (OUTPATIENT)
Dept: PSYCHOLOGY | Facility: CLINIC | Age: 53
End: 2023-03-31
Payer: COMMERCIAL

## 2023-03-31 DIAGNOSIS — F43.21 GRIEF: ICD-10-CM

## 2023-03-31 DIAGNOSIS — F43.23 ADJUSTMENT DISORDER WITH MIXED ANXIETY AND DEPRESSED MOOD: Primary | ICD-10-CM

## 2023-03-31 PROCEDURE — 90791 PSYCH DIAGNOSTIC EVALUATION: CPT | Mod: VID | Performed by: SOCIAL WORKER

## 2023-03-31 ASSESSMENT — COLUMBIA-SUICIDE SEVERITY RATING SCALE - C-SSRS
6. HAVE YOU EVER DONE ANYTHING, STARTED TO DO ANYTHING, OR PREPARED TO DO ANYTHING TO END YOUR LIFE?: NO
TOTAL  NUMBER OF ABORTED OR SELF INTERRUPTED ATTEMPTS LIFETIME: NO
2. HAVE YOU ACTUALLY HAD ANY THOUGHTS OF KILLING YOURSELF?: NO
1. HAVE YOU WISHED YOU WERE DEAD OR WISHED YOU COULD GO TO SLEEP AND NOT WAKE UP?: NO
ATTEMPT LIFETIME: NO
TOTAL  NUMBER OF INTERRUPTED ATTEMPTS LIFETIME: NO

## 2023-03-31 ASSESSMENT — PATIENT HEALTH QUESTIONNAIRE - PHQ9
10. IF YOU CHECKED OFF ANY PROBLEMS, HOW DIFFICULT HAVE THESE PROBLEMS MADE IT FOR YOU TO DO YOUR WORK, TAKE CARE OF THINGS AT HOME, OR GET ALONG WITH OTHER PEOPLE: SOMEWHAT DIFFICULT
SUM OF ALL RESPONSES TO PHQ QUESTIONS 1-9: 3
SUM OF ALL RESPONSES TO PHQ QUESTIONS 1-9: 3
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
## 2023-04-15 ENCOUNTER — HEALTH MAINTENANCE LETTER (OUTPATIENT)
Age: 53
End: 2023-04-15

## 2023-05-10 ENCOUNTER — VIRTUAL VISIT (OUTPATIENT)
Dept: PSYCHOLOGY | Facility: CLINIC | Age: 53
End: 2023-05-10
Payer: COMMERCIAL

## 2023-05-10 DIAGNOSIS — F43.23 ADJUSTMENT DISORDER WITH MIXED ANXIETY AND DEPRESSED MOOD: Primary | ICD-10-CM

## 2023-05-10 DIAGNOSIS — F43.21 GRIEF: ICD-10-CM

## 2023-05-10 PROCEDURE — 90834 PSYTX W PT 45 MINUTES: CPT | Mod: VID | Performed by: SOCIAL WORKER

## 2023-05-10 ASSESSMENT — ANXIETY QUESTIONNAIRES
2. NOT BEING ABLE TO STOP OR CONTROL WORRYING: SEVERAL DAYS
6. BECOMING EASILY ANNOYED OR IRRITABLE: NOT AT ALL
7. FEELING AFRAID AS IF SOMETHING AWFUL MIGHT HAPPEN: SEVERAL DAYS
GAD7 TOTAL SCORE: 7
1. FEELING NERVOUS, ANXIOUS, OR ON EDGE: MORE THAN HALF THE DAYS
GAD7 TOTAL SCORE: 7
GAD7 TOTAL SCORE: 7
3. WORRYING TOO MUCH ABOUT DIFFERENT THINGS: SEVERAL DAYS
4. TROUBLE RELAXING: SEVERAL DAYS
5. BEING SO RESTLESS THAT IT IS HARD TO SIT STILL: SEVERAL DAYS
7. FEELING AFRAID AS IF SOMETHING AWFUL MIGHT HAPPEN: SEVERAL DAYS

## 2023-05-10 ASSESSMENT — PATIENT HEALTH QUESTIONNAIRE - PHQ9
SUM OF ALL RESPONSES TO PHQ QUESTIONS 1-9: 2
10. IF YOU CHECKED OFF ANY PROBLEMS, HOW DIFFICULT HAVE THESE PROBLEMS MADE IT FOR YOU TO DO YOUR WORK, TAKE CARE OF THINGS AT HOME, OR GET ALONG WITH OTHER PEOPLE: SOMEWHAT DIFFICULT
SUM OF ALL RESPONSES TO PHQ QUESTIONS 1-9: 2
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
## 2023-05-30 ENCOUNTER — VIRTUAL VISIT (OUTPATIENT)
Dept: FAMILY MEDICINE | Facility: CLINIC | Age: 53
End: 2023-05-30
Payer: COMMERCIAL

## 2023-05-30 DIAGNOSIS — F33.41 RECURRENT MAJOR DEPRESSIVE DISORDER, IN PARTIAL REMISSION (H): ICD-10-CM

## 2023-05-30 DIAGNOSIS — F41.9 ANXIETY: Primary | ICD-10-CM

## 2023-05-30 DIAGNOSIS — F43.21 GRIEF: ICD-10-CM

## 2023-05-30 DIAGNOSIS — F32.2 MAJOR DEPRESSIVE DISORDER, SINGLE EPISODE, SEVERE WITHOUT PSYCHOTIC FEATURES (H): ICD-10-CM

## 2023-05-30 PROCEDURE — 99213 OFFICE O/P EST LOW 20 MIN: CPT | Mod: VID | Performed by: FAMILY MEDICINE

## 2023-05-30 RX ORDER — VENLAFAXINE HYDROCHLORIDE 37.5 MG/1
37.5 CAPSULE, EXTENDED RELEASE ORAL DAILY
Qty: 30 CAPSULE | Refills: 1 | Status: SHIPPED | OUTPATIENT
Start: 2023-05-30 | End: 2023-09-22

## 2023-05-30 RX ORDER — VENLAFAXINE HYDROCHLORIDE 75 MG/1
CAPSULE, EXTENDED RELEASE ORAL
Qty: 30 CAPSULE | Refills: 1 | Status: SHIPPED | OUTPATIENT
Start: 2023-05-30 | End: 2023-12-12

## 2023-05-30 RX ORDER — BUPROPION HYDROCHLORIDE 150 MG/1
150 TABLET ORAL EVERY MORNING
Qty: 30 TABLET | Refills: 1 | Status: SHIPPED | OUTPATIENT
Start: 2023-05-30 | End: 2023-10-23

## 2023-05-30 ASSESSMENT — ANXIETY QUESTIONNAIRES
GAD7 TOTAL SCORE: 7
7. FEELING AFRAID AS IF SOMETHING AWFUL MIGHT HAPPEN: SEVERAL DAYS
5. BEING SO RESTLESS THAT IT IS HARD TO SIT STILL: SEVERAL DAYS
8. IF YOU CHECKED OFF ANY PROBLEMS, HOW DIFFICULT HAVE THESE MADE IT FOR YOU TO DO YOUR WORK, TAKE CARE OF THINGS AT HOME, OR GET ALONG WITH OTHER PEOPLE?: SOMEWHAT DIFFICULT
IF YOU CHECKED OFF ANY PROBLEMS ON THIS QUESTIONNAIRE, HOW DIFFICULT HAVE THESE PROBLEMS MADE IT FOR YOU TO DO YOUR WORK, TAKE CARE OF THINGS AT HOME, OR GET ALONG WITH OTHER PEOPLE: SOMEWHAT DIFFICULT
7. FEELING AFRAID AS IF SOMETHING AWFUL MIGHT HAPPEN: SEVERAL DAYS
6. BECOMING EASILY ANNOYED OR IRRITABLE: SEVERAL DAYS
GAD7 TOTAL SCORE: 7
2. NOT BEING ABLE TO STOP OR CONTROL WORRYING: SEVERAL DAYS
3. WORRYING TOO MUCH ABOUT DIFFERENT THINGS: SEVERAL DAYS
1. FEELING NERVOUS, ANXIOUS, OR ON EDGE: SEVERAL DAYS
4. TROUBLE RELAXING: SEVERAL DAYS

## 2023-05-30 NOTE — PROGRESS NOTES
Jennifer is a 52 year old who is being evaluated via a billable video visit.     Video visit performed in lieu of an in-person visit due to current concerns regarding social distancing and keeping people safe.  Physician and patient agreed this was appropriate for concerns addressed.     How would you like to obtain your AVS? MyChart  If the video visit is dropped, the invitation should be resent by: Text to cell phone: [phone]  Will anyone else be joining your video visit? No    A/P:      ICD-10-CM    1. Anxiety  F41.9 venlafaxine (EFFEXOR XR) 75 MG 24 hr capsule     venlafaxine (EFFEXOR XR) 37.5 MG 24 hr capsule     buPROPion (WELLBUTRIN XL) 150 MG 24 hr tablet      2. Grief  F43.21       3. Recurrent major depressive disorder, in partial remission (H)  F33.41 venlafaxine (EFFEXOR XR) 75 MG 24 hr capsule     venlafaxine (EFFEXOR XR) 37.5 MG 24 hr capsule         We talked about changing her medication. Will gradually wean her off Effexor and on to Wellbutrin, see patient instructions. I'll follow up with her again in about 2-3 months to see how the transition is going. In the meantime will continue her counseling. Will follow up sooner prn. I did move chem Dep to her history and took off problem list.     See patient instructions:    Patient Instructions   I sent a prescription for the 75 mg venlafaxine to the pharmacy.  Once you pick those up, start alternating 75 mg every other day with 150 mg.  Continue that for 2 weeks.  After 2 weeks just stay on the 75 mg every day.  I do have a refill on there for when you run out.  Stay on the 75 mg every day for 1 month.  I also sent a prescription to the pharmacy for the 37-1/2 mg capsules.  After the month on the 75 mg daily dose, alternate your 75 mg with 37.5 mg every other day for an additional 2 weeks.   After 2 weeks just stay on the 37.5 mg daily dose.   Once you are down to the 37.5 mg daily, start on the bupropion, also known as wellbutrin. This will be a  starting dose of 150 mg daily in the morning. I am hoping this is more effective for your anxiety.     In the meantime, you can try taking 2 of your hydroxyzine (50 mg total) as needed for when the anxiety is more severe (the pacing episodes). This has a mild sedative effect for many people and is not addictive.     We'll have a follow up visit in about 2 1/2 - 3 months. I extended it out to give you time to start on the wellbutrin so we can see if it's helping at all yet by the time we follow up.       Marni Montemayor MD     Nisha Rodriguez is a 52 year old, presenting for the following health issues:  Recheck Medication         View : No data to display.              History of Present Illness       Mental Health Follow-up:  Patient presents to follow-up on Anxiety.    Patient's anxiety since last visit has been:  Medium  The patient is not having other symptoms associated with anxiety.  Any significant life events: grief or loss  Patient is not feeling anxious or having panic attacks.  Patient has no concerns about alcohol or drug use.She consumes 1 sweetened beverage(s) daily.She exercises with enough effort to increase her heart rate 30 to 60 minutes per day.  She exercises with enough effort to increase her heart rate 3 or less days per week.   She is taking medications regularly.  Today's MIKE-7 Score: 7       Medication Followup of general    Taking Medication as prescribed: yes    Side Effects:  None    Medication Helping Symptoms:  yes    Overall she is doing well from a depression standpoint, but still struggling with a bit of anxiety.  She feels like her anxiety is increased and her Effexor does not seem to be giving her a whole lot of help.  She is doing psychotherapy now and that is helping quite a bit, she goes every 2 weeks.  She is not really concerned about her depression at this time. Over the years she has learned a lot of coping skills for her depression and she feels like she is  handling that very well. She stays well connected to her sister, they talk every day. She taught herself how to jaqueline and is doing that with her sister (long distance) in memory of their mom, who was very good at knitting and such, which brings her ivonne and helps her remember and honor her mom.     She would like to have chemical dependency removed from her PL, states that was a long time ago and no longer an issue.     She is using trazodone and it works well for sleep.     Review of Systems   Constitutional, HEENT, cardiovascular, pulmonary, gi and gu systems are negative, except as otherwise noted.      Objective           Vitals:  No vitals were obtained today due to virtual visit.    Physical Exam   GENERAL: Healthy, alert and no distress  EYES: Eyes grossly normal to inspection.  No discharge or erythema, or obvious scleral/conjunctival abnormalities.  RESP: No audible wheeze, cough, or visible cyanosis.  No visible retractions or increased work of breathing.    SKIN: Visible skin clear. No significant rash, abnormal pigmentation or lesions.  NEURO: Cranial nerves grossly intact.  Mentation and speech appropriate for age.  PSYCH: Mentation appears normal, affect normal/bright, judgement and insight intact, normal speech and appearance well-groomed.          Video-Visit Details    Type of service:  Video Visit     Originating Location (pt. Location): Home  Distant Location (provider location):  On-site  Platform used for Video Visit: Feniks

## 2023-05-30 NOTE — PATIENT INSTRUCTIONS
I sent a prescription for the 75 mg venlafaxine to the pharmacy.  Once you pick those up, start alternating 75 mg every other day with 150 mg.  Continue that for 2 weeks.  After 2 weeks just stay on the 75 mg every day.  I do have a refill on there for when you run out.  Stay on the 75 mg every day for 1 month.  I also sent a prescription to the pharmacy for the 37-1/2 mg capsules.  After the month on the 75 mg daily dose, alternate your 75 mg with 37.5 mg every other day for an additional 2 weeks.   After 2 weeks just stay on the 37.5 mg daily dose.   Once you are down to the 37.5 mg daily, start on the bupropion, also known as wellbutrin. This will be a starting dose of 150 mg daily in the morning. I am hoping this is more effective for your anxiety.     In the meantime, you can try taking 2 of your hydroxyzine (50 mg total) as needed for when the anxiety is more severe (the pacing episodes). This has a mild sedative effect for many people and is not addictive.     We'll have a follow up visit in about 2 1/2 - 3 months. I extended it out to give you time to start on the wellbutrin so we can see if it's helping at all yet by the time we follow up.

## 2023-06-01 PROBLEM — F33.41 RECURRENT MAJOR DEPRESSIVE DISORDER, IN PARTIAL REMISSION (H): Status: ACTIVE | Noted: 2023-06-01

## 2023-06-01 PROBLEM — F41.9 ANXIETY: Status: ACTIVE | Noted: 2023-06-01

## 2023-06-01 PROBLEM — F43.21 GRIEF: Status: ACTIVE | Noted: 2023-06-01

## 2023-06-01 RX ORDER — VENLAFAXINE HYDROCHLORIDE 75 MG/1
CAPSULE, EXTENDED RELEASE ORAL
Qty: 30 CAPSULE | Refills: 1 | OUTPATIENT
Start: 2023-06-01

## 2023-06-06 DIAGNOSIS — J31.0 RHINOCONJUNCTIVITIS: ICD-10-CM

## 2023-06-06 DIAGNOSIS — H10.9 RHINOCONJUNCTIVITIS: ICD-10-CM

## 2023-06-07 RX ORDER — MONTELUKAST SODIUM 10 MG/1
TABLET ORAL
Qty: 90 TABLET | Refills: 1 | Status: SHIPPED | OUTPATIENT
Start: 2023-06-07 | End: 2023-10-02

## 2023-06-07 NOTE — TELEPHONE ENCOUNTER
Prescription approved per [facility] Refill Protocol.\  Chrystal Duke RN on 6/7/2023 at 12:00 PM

## 2023-06-09 ENCOUNTER — VIRTUAL VISIT (OUTPATIENT)
Dept: PSYCHOLOGY | Facility: CLINIC | Age: 53
End: 2023-06-09
Payer: COMMERCIAL

## 2023-06-09 DIAGNOSIS — F43.23 ADJUSTMENT DISORDER WITH MIXED ANXIETY AND DEPRESSED MOOD: Primary | ICD-10-CM

## 2023-06-09 PROCEDURE — 90834 PSYTX W PT 45 MINUTES: CPT | Mod: VID | Performed by: SOCIAL WORKER
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
## 2023-07-05 ENCOUNTER — VIRTUAL VISIT (OUTPATIENT)
Dept: PSYCHOLOGY | Facility: CLINIC | Age: 53
End: 2023-07-05
Payer: COMMERCIAL

## 2023-07-05 DIAGNOSIS — F43.23 ADJUSTMENT DISORDER WITH MIXED ANXIETY AND DEPRESSED MOOD: Primary | ICD-10-CM

## 2023-07-05 PROCEDURE — 90837 PSYTX W PT 60 MINUTES: CPT | Mod: VID | Performed by: SOCIAL WORKER

## 2023-07-05 ASSESSMENT — PATIENT HEALTH QUESTIONNAIRE - PHQ9
SUM OF ALL RESPONSES TO PHQ QUESTIONS 1-9: 2
10. IF YOU CHECKED OFF ANY PROBLEMS, HOW DIFFICULT HAVE THESE PROBLEMS MADE IT FOR YOU TO DO YOUR WORK, TAKE CARE OF THINGS AT HOME, OR GET ALONG WITH OTHER PEOPLE: NOT DIFFICULT AT ALL
SUM OF ALL RESPONSES TO PHQ QUESTIONS 1-9: 2
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
## 2023-07-30 DIAGNOSIS — F41.9 ANXIETY: ICD-10-CM

## 2023-07-30 DIAGNOSIS — F33.41 RECURRENT MAJOR DEPRESSIVE DISORDER, IN PARTIAL REMISSION (H): ICD-10-CM

## 2023-07-31 RX ORDER — VENLAFAXINE HYDROCHLORIDE 75 MG/1
CAPSULE, EXTENDED RELEASE ORAL
Qty: 30 CAPSULE | Refills: 1 | OUTPATIENT
Start: 2023-07-31

## 2023-09-16 ENCOUNTER — HEALTH MAINTENANCE LETTER (OUTPATIENT)
Age: 53
End: 2023-09-16

## 2023-09-22 DIAGNOSIS — F33.41 RECURRENT MAJOR DEPRESSIVE DISORDER, IN PARTIAL REMISSION (H): ICD-10-CM

## 2023-09-22 DIAGNOSIS — F41.9 ANXIETY: ICD-10-CM

## 2023-09-22 RX ORDER — VENLAFAXINE HYDROCHLORIDE 37.5 MG/1
37.5 CAPSULE, EXTENDED RELEASE ORAL DAILY
Qty: 30 CAPSULE | Refills: 1 | Status: SHIPPED | OUTPATIENT
Start: 2023-09-22 | End: 2023-10-27

## 2023-09-30 ENCOUNTER — TELEPHONE (OUTPATIENT)
Dept: FAMILY MEDICINE | Facility: CLINIC | Age: 53
End: 2023-09-30
Payer: COMMERCIAL

## 2023-09-30 DIAGNOSIS — F33.41 RECURRENT MAJOR DEPRESSIVE DISORDER, IN PARTIAL REMISSION (H): ICD-10-CM

## 2023-09-30 DIAGNOSIS — F41.9 ANXIETY: ICD-10-CM

## 2023-09-30 DIAGNOSIS — H10.9 RHINOCONJUNCTIVITIS: ICD-10-CM

## 2023-09-30 DIAGNOSIS — J31.0 RHINOCONJUNCTIVITIS: ICD-10-CM

## 2023-10-02 RX ORDER — MONTELUKAST SODIUM 10 MG/1
TABLET ORAL
Qty: 90 TABLET | Refills: 1 | Status: SHIPPED | OUTPATIENT
Start: 2023-10-02 | End: 2024-09-03

## 2023-10-02 RX ORDER — VENLAFAXINE HYDROCHLORIDE 75 MG/1
CAPSULE, EXTENDED RELEASE ORAL
Qty: 30 CAPSULE | Refills: 1 | OUTPATIENT
Start: 2023-10-02

## 2023-10-02 NOTE — TELEPHONE ENCOUNTER
Forwarding to triage to review further.    Most recent Rx sent was for 37.5 mg cap. Unsure if patient is taking both or just the 37.5 now. Last refill sent for the 75 mg cap was for 60 day supply 05/30/23.    Elsi Samaniego RN

## 2023-10-19 DIAGNOSIS — F33.41 RECURRENT MAJOR DEPRESSIVE DISORDER, IN PARTIAL REMISSION (H): ICD-10-CM

## 2023-10-19 DIAGNOSIS — F41.9 ANXIETY: ICD-10-CM

## 2023-10-19 NOTE — LETTER
[address] [address]  Phone: [phone]  Fax: [phone]        October 27, 2023      Jennifer Reyes                                                                                                                                [address] [place] [id number]            Dear Ms. Reyes,    We are concerned about your health care.  We recently provided you with a medication refill.  Many medications require routine follow-up with your Doctor.      At this time we ask that: You schedule a routine office visit with your physician to follow your medications with a virtual visit sometime before mid December with Dr. Montemayor.     Your prescription: Has been refilled for 1 month so you may have time for the above noted follow-up.      Thank you,      Marni Montemayor MD/ Care Team

## 2023-10-22 NOTE — TELEPHONE ENCOUNTER
Please contact patient and find out how she is doing with her venlafaxine. She was supposed to be going off this over the summer. Is she still taking it?  Marni Montemayor MD

## 2023-10-23 NOTE — TELEPHONE ENCOUNTER
"MA called patient, she is still taking Venlafaxine 37.5 mg once daily. Pt is doing ok, gets terrible headaches.     Wants to do the Venlafaxine 37.5 mg every other day and Wellbutrin every other day for a few more weeks.     Asking for medication refills. Please advise.    Requested Prescriptions   Pending Prescriptions Disp Refills    venlafaxine (EFFEXOR XR) 37.5 MG 24 hr capsule [Pharmacy Med Name: VENLAFAXINE HCL ER 37.5 MG CAP] 90 capsule 1     Sig: TAKE 1 CAPSULE BY MOUTH EVERY DAY       Serotonin-Norepinephrine Reuptake Inhibitors  Passed - 10/22/2023  1:26 PM        Passed - Blood pressure under 140/90 in past 12 months     BP Readings from Last 3 Encounters:   12/02/22 116/78   10/25/22 105/65   10/19/22 132/68                 Passed - PHQ-9 score of less than 5 in past 6 months     Please review last PHQ-9 score.           Passed - Medication is active on med list        Passed - Patient is age 18 or older        Passed - No active pregnancy on record        Passed - Normal serum creatinine on file in past 12 months     Recent Labs   Lab Test 10/24/22  0659   CR 0.58       Ok to refill medication if creatinine is low          Passed - No positive pregnancy test in past 12 months        Passed - Recent (6 mo) or future (30 days) visit within the authorizing provider's specialty     Patient had office visit in the last 6 months or has a visit in the next 30 days with authorizing provider or within the authorizing provider's specialty.  See \"Patient Info\" tab in inbasket, or \"Choose Columns\" in Meds & Orders section of the refill encounter.              buPROPion (WELLBUTRIN XL) 150 MG 24 hr tablet 30 tablet 1     Sig: Take 1 tablet (150 mg) by mouth every morning       There is no refill protocol information for this order            Gloria Rosenthal MA            "

## 2023-10-27 RX ORDER — VENLAFAXINE HYDROCHLORIDE 37.5 MG/1
37.5 CAPSULE, EXTENDED RELEASE ORAL DAILY
Qty: 45 CAPSULE | Refills: 1 | Status: SHIPPED | OUTPATIENT
Start: 2023-10-27 | End: 2023-12-12

## 2023-10-27 RX ORDER — BUPROPION HYDROCHLORIDE 150 MG/1
150 TABLET ORAL EVERY OTHER DAY
Qty: 45 TABLET | Refills: 1 | Status: SHIPPED | OUTPATIENT
Start: 2023-10-27 | End: 2023-12-12

## 2023-10-27 NOTE — TELEPHONE ENCOUNTER
I will approve, please set her up for a virtual follow up sometime before mid December.   Marni Montemayor MD

## 2023-11-07 ENCOUNTER — FCC EXTENDED DOCUMENTATION (OUTPATIENT)
Dept: PSYCHOLOGY | Facility: CLINIC | Age: 53
End: 2023-11-07
Payer: COMMERCIAL
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
## 2023-12-09 ASSESSMENT — ANXIETY QUESTIONNAIRES
6. BECOMING EASILY ANNOYED OR IRRITABLE: SEVERAL DAYS
3. WORRYING TOO MUCH ABOUT DIFFERENT THINGS: NEARLY EVERY DAY
GAD7 TOTAL SCORE: 14
4. TROUBLE RELAXING: MORE THAN HALF THE DAYS
2. NOT BEING ABLE TO STOP OR CONTROL WORRYING: MORE THAN HALF THE DAYS
7. FEELING AFRAID AS IF SOMETHING AWFUL MIGHT HAPPEN: MORE THAN HALF THE DAYS
IF YOU CHECKED OFF ANY PROBLEMS ON THIS QUESTIONNAIRE, HOW DIFFICULT HAVE THESE PROBLEMS MADE IT FOR YOU TO DO YOUR WORK, TAKE CARE OF THINGS AT HOME, OR GET ALONG WITH OTHER PEOPLE: VERY DIFFICULT
1. FEELING NERVOUS, ANXIOUS, OR ON EDGE: NEARLY EVERY DAY
5. BEING SO RESTLESS THAT IT IS HARD TO SIT STILL: SEVERAL DAYS
GAD7 TOTAL SCORE: 14

## 2023-12-12 ENCOUNTER — VIRTUAL VISIT (OUTPATIENT)
Dept: FAMILY MEDICINE | Facility: CLINIC | Age: 53
End: 2023-12-12
Payer: COMMERCIAL

## 2023-12-12 DIAGNOSIS — F41.9 ANXIETY: ICD-10-CM

## 2023-12-12 DIAGNOSIS — S83.207S ACUTE MENISCAL TEAR OF LEFT KNEE, SEQUELA: ICD-10-CM

## 2023-12-12 DIAGNOSIS — I42.9 IDIOPATHIC CARDIOMYOPATHY (H): ICD-10-CM

## 2023-12-12 DIAGNOSIS — F32.2 MAJOR DEPRESSIVE DISORDER, SINGLE EPISODE, SEVERE WITHOUT PSYCHOTIC FEATURES (H): Primary | ICD-10-CM

## 2023-12-12 DIAGNOSIS — S73.199S TEAR OF ACETABULAR LABRUM, UNSPECIFIED LATERALITY, SEQUELA: ICD-10-CM

## 2023-12-12 DIAGNOSIS — G47.00 PERSISTENT INSOMNIA: ICD-10-CM

## 2023-12-12 PROBLEM — D69.6 THROMBOCYTOPENIA (H): Status: RESOLVED | Noted: 2022-08-05 | Resolved: 2023-12-12

## 2023-12-12 PROBLEM — M51.26 HERNIATION OF INTERVERTEBRAL DISC OF LUMBAR SPINE: Status: ACTIVE | Noted: 2022-09-27

## 2023-12-12 PROCEDURE — 99214 OFFICE O/P EST MOD 30 MIN: CPT | Mod: VID | Performed by: FAMILY MEDICINE

## 2023-12-12 RX ORDER — TRAZODONE HYDROCHLORIDE 100 MG/1
TABLET ORAL
Qty: 100 TABLET | Refills: 3 | Status: SHIPPED | OUTPATIENT
Start: 2023-12-12 | End: 2024-03-25 | Stop reason: ALTCHOICE

## 2023-12-12 RX ORDER — BUPROPION HYDROCHLORIDE 150 MG/1
TABLET ORAL
Qty: 100 TABLET | Refills: 3 | Status: SHIPPED | OUTPATIENT
Start: 2023-12-12 | End: 2024-03-25

## 2023-12-12 RX ORDER — HYDROXYZINE PAMOATE 25 MG/1
CAPSULE ORAL
Qty: 60 CAPSULE | Refills: 2 | Status: SHIPPED | OUTPATIENT
Start: 2023-12-12 | End: 2024-03-25

## 2023-12-12 NOTE — PROGRESS NOTES
"  Instructions Relayed to Patient by Virtual Roomer:     Patient is active on [employer]:   Relayed following to patient: \"It looks like you are active on [employer], are you able to join the visit this way? If not, do you need us to send you a link now or would you like your provider to send a link via text or email when they are ready to initiate the visit?\"    Reminded patient to ensure they were logged on to virtual visit by arrival time listed. Documented in appointment notes if patient had flexibility to initiate visit sooner than arrival time. If pediatric virtual visit, ensured pediatric patient along with parent/guardian will be present for video visit.     Patient offered the website [email or web address] and/or phone number to [employer] Help line: [phone]      Answers submitted by the patient for this visit:  MIKE-7 (Submitted on 12/9/2023)  MIKE 7 TOTAL SCORE: 14  Depression / Anxiety Questionnaire (Submitted on 12/9/2023)  Chief Complaint: Chronic problems general questions HPI Form  Depression/Anxiety: Anxiety  Anxiety only (Submitted on 12/9/2023)  Chief Complaint: Chronic problems general questions HPI Form  Anxiety since last: : bad  Other associated symotome: : Yes  Significant life event: : other  Anxious:: Yes  Current substance use:: No  General Questionnaire (Submitted on 12/9/2023)  Chief Complaint: Chronic problems general questions HPI Form  How many servings of fruits and vegetables do you eat daily?: 2-3  On average, how many sweetened beverages do you drink each day (Examples: soda, juice, sweet tea, etc.  Do NOT count diet or artificially sweetened beverages)?: 0  How many minutes a day do you exercise enough to make your heart beat faster?: 20 to 29  How many days a week do you exercise enough to make your heart beat faster?: 3 or less  How many days per week do you miss taking your medication?: 0  Jennifer is a 53 year old who is being evaluated via a billable video visit.  "     Video visit performed in lieu of an in-person visit due to current concerns regarding social distancing and keeping people safe.  Physician and patient agreed this was appropriate for concerns addressed.     How would you like to obtain your AVS? Suman  If the video visit is dropped, the invitation should be resent by: Text to cell phone: [phone]  Will anyone else be joining your video visit? No          Assessment & Plan       ICD-10-CM    1. Major depressive disorder, single episode, severe without psychotic features (H)  F32.2 traZODone (DESYREL) 100 MG tablet      2. Anxiety  F41.9 hydrOXYzine jaquan (VISTARIL) 25 MG capsule     buPROPion (WELLBUTRIN XL) 150 MG 24 hr tablet      3. Persistent insomnia  G47.00 traZODone (DESYREL) 100 MG tablet      4. Tear of acetabular labrum, unspecified laterality, sequela  S73.199S       5. Acute meniscal tear of left knee, sequela  S83.207S       6. Idiopathic cardiomyopathy (H)  I42.9          Regarding her depression and anxiety, we are going to keep her on the same dose of Wellbutrin 150 mg daily for now.  We did discuss the option of increasing the dose up to 300 mg and eventually even up to 400 mg if needed.  I am going to represcribe her medication with the option of going up to 300 for now.  She is going to give it a few more weeks at least on the 150 mg dose and then if she feels she needs to go up at that point she can.  We discussed that many of her stressors right now are external to her and are not going to change with medication, but if she feels she needs a higher dose of medication to be able to cope with her stress then she certainly can do that.    I also encouraged her to try the hydroxyzine again but at a slightly higher dose.  She tried 25 mg and it was ineffective.  It was also not sedating so she can go up to 50 mg and potentially even up to 100 mg up to 3 times daily but would watch for sedation effects.  If she is doing well I will like to see  her back in about 6 months.  If she needs changes in the meantime she will follow-up with one of my partners in my absence.    Regarding her upcoming surgery and her hip and knee pain, I have advised her to talk with her surgeon ahead of time to make sure they are willing to prescribe her enough pain medication to last through her travel  If they cannot or will not then she might check with her other family medicine doctor that does her physicals, Dr. Contreras, or can get a prescription from me for short-term use of her pain medication.  Encouraged her to plan extra time for her travel and be okay with sitting out of certain activities if need be due to mobility limitations or pain.  Encouraged her to continue doing puzzles or other sedentary activities that she can enjoy during this time when she has to be less physically active than usual.    She does have a history of thrombocytopenia years ago but her most recent blood counts have been normal including platelets.  I have resolved that problem in the past and I am resolving it again.  She also had a history of idiopathic cardiomyopathy many years ago but has not had any trouble in the last 10 years or more.  I am adding that to her history but not an active problem.  She does follow-up with Dr. Contreras at [facility] for annual physicals and reviews her screening recommendations.      Review of prior external note(s) from - [facility]where information from [facility] reviewed  20 minutes spent by me on the date of the encounter doing chart review, history and exam, documentation and further activities per the note      Marni Montemayor MD  [facility] STEPHON Rodriguez is a 53 year old, presenting for the following health issues:  Anxiety        12/12/2023    10:28 AM   Additional Questions   Roomed by cassandra       History of Present Illness       Mental Health Follow-up:  Patient presents to follow-up on Anxiety.    Patient's anxiety since  last visit has been:  Bad  The patient is having other symptoms associated with anxiety.  Any significant life events: other  Patient is feeling anxious or having panic attacks.  Patient has no concerns about alcohol or drug use.    She eats 2-3 servings of fruits and vegetables daily.She consumes 0 sweetened beverage(s) daily.She exercises with enough effort to increase her heart rate 20 to 29 minutes per day.  She exercises with enough effort to increase her heart rate 3 or less days per week.   She is taking medications regularly.     Jennifer is seeing me today to follow-up on her medications.  At our last visit in the spring, we slowly transitioned her from venlafaxine to Wellbutrin.  She has been completely off the venlafaxine for about the past month and is now taking 150 mg of Wellbutrin daily.  She finds it is working okay but she is going through a lot lately and has a lot more anxiety than usual.      She had a hiking accident in October where she fell down quite of bit of rock and landed on her knees.  She tore the labrum in both hips and tore her left knee meniscus.  She had surgery on November 15 for the left hip to repair the labrum and on December 20 she will be undergoing the right hip repair and then she may at some point need knee surgery down the road.  This has taken a toll on her, because she is used to being very active.      Also she has a large [place] so we will be leaving here on the 27th and be gone for 2 weeks.  She will be 1 week postsurgery and knows that it would be a more difficult trip than expected.  She had planned a lot of hiking activities and kayaking and such and she knows she will not be able to do that.  However she does not want to cancel the trip because her kids are excited about it and it has been planned as a big family outing for some time.  She plans to have a wheelchair available and will be only doing activities that she  can do in her wheelchair.    She is currently walking on crutches.      She is currently not working, is on short-term disability with her injuries.  She has also had some family stresses, her daughter has had difficulty finding a job in her field so financially is not doing well and has some mental health challenges as well.  Jennifer has been trying to be supportive of her but not enabling and it is a fine-line.  She is struggling with that.      She has nothing to use for anxiety, has tried hydroxyzine in the past but it was ineffective.  She tried 25 mg.    She is using trazodone at night for sleep, takes 100 mg at bedtime and occasionally wakes up at 2 AM and has to take a second 1 to fall back asleep.      She is noticed a little bit of stomach upset in the past 2 weeks and wonders if that is from the Wellbutrin or if it could be from her pain medication from surgery.  She has been using MiraLAX as needed for her bowel movements and has been working well.      She does chronically have some Norco available for her chronic back pain but she has not been taking that at all while on her Percocet for surgery.  She was on up to 6 tablets a day for Percocet but now she is down to a maximum of 4/day as needed.    Review of Systems   Constitutional, HEENT, cardiovascular, pulmonary, gi and gu systems are negative, except as otherwise noted.      Objective           Vitals:  No vitals were obtained today due to virtual visit.    Physical Exam   GENERAL: Healthy, alert and no distress  EYES: Eyes grossly normal to inspection.  No discharge or erythema, or obvious scleral/conjunctival abnormalities.  RESP: No audible wheeze, cough, or visible cyanosis.  No visible retractions or increased work of breathing.    SKIN: Visible skin clear. No significant rash, abnormal pigmentation or lesions.  NEURO: Cranial nerves grossly intact.  Mentation and speech appropriate for age.  PSYCH: Mentation appears normal, affect  normal/bright, judgement and insight intact, normal speech and appearance well-groomed.          Video-Visit Details    Type of service:  Video Visit     Originating Location (pt. Location): Home    Distant Location (provider location):  On-site  Platform used for Video Visit: Kristen

## 2024-03-25 ENCOUNTER — VIRTUAL VISIT (OUTPATIENT)
Dept: FAMILY MEDICINE | Facility: CLINIC | Age: 54
End: 2024-03-25
Payer: COMMERCIAL

## 2024-03-25 DIAGNOSIS — F41.9 ANXIETY: Primary | ICD-10-CM

## 2024-03-25 DIAGNOSIS — G47.00 PERSISTENT INSOMNIA: ICD-10-CM

## 2024-03-25 PROCEDURE — 99213 OFFICE O/P EST LOW 20 MIN: CPT | Mod: 95 | Performed by: FAMILY MEDICINE

## 2024-03-25 RX ORDER — ESCITALOPRAM OXALATE 10 MG/1
10 TABLET ORAL DAILY
Qty: 30 TABLET | Refills: 1 | Status: SHIPPED | OUTPATIENT
Start: 2024-03-25 | End: 2024-04-16

## 2024-03-25 RX ORDER — ZOLPIDEM TARTRATE 10 MG/1
5 TABLET ORAL
Qty: 30 TABLET | Refills: 1 | Status: SHIPPED | OUTPATIENT
Start: 2024-03-25 | End: 2024-04-01 | Stop reason: ALTCHOICE

## 2024-03-25 RX ORDER — ALPRAZOLAM 0.5 MG
0.5 TABLET ORAL 2 TIMES DAILY PRN
Qty: 8 TABLET | Refills: 0 | Status: SHIPPED | OUTPATIENT
Start: 2024-03-25 | End: 2024-04-05 | Stop reason: DRUGHIGH

## 2024-03-25 ASSESSMENT — ANXIETY QUESTIONNAIRES
4. TROUBLE RELAXING: SEVERAL DAYS
5. BEING SO RESTLESS THAT IT IS HARD TO SIT STILL: SEVERAL DAYS
GAD7 TOTAL SCORE: 12
GAD7 TOTAL SCORE: 12
7. FEELING AFRAID AS IF SOMETHING AWFUL MIGHT HAPPEN: SEVERAL DAYS
GAD7 TOTAL SCORE: 12
2. NOT BEING ABLE TO STOP OR CONTROL WORRYING: MORE THAN HALF THE DAYS
6. BECOMING EASILY ANNOYED OR IRRITABLE: MORE THAN HALF THE DAYS
7. FEELING AFRAID AS IF SOMETHING AWFUL MIGHT HAPPEN: SEVERAL DAYS
1. FEELING NERVOUS, ANXIOUS, OR ON EDGE: NEARLY EVERY DAY
3. WORRYING TOO MUCH ABOUT DIFFERENT THINGS: MORE THAN HALF THE DAYS
IF YOU CHECKED OFF ANY PROBLEMS ON THIS QUESTIONNAIRE, HOW DIFFICULT HAVE THESE PROBLEMS MADE IT FOR YOU TO DO YOUR WORK, TAKE CARE OF THINGS AT HOME, OR GET ALONG WITH OTHER PEOPLE: SOMEWHAT DIFFICULT
8. IF YOU CHECKED OFF ANY PROBLEMS, HOW DIFFICULT HAVE THESE MADE IT FOR YOU TO DO YOUR WORK, TAKE CARE OF THINGS AT HOME, OR GET ALONG WITH OTHER PEOPLE?: SOMEWHAT DIFFICULT

## 2024-03-25 ASSESSMENT — PATIENT HEALTH QUESTIONNAIRE - PHQ9
10. IF YOU CHECKED OFF ANY PROBLEMS, HOW DIFFICULT HAVE THESE PROBLEMS MADE IT FOR YOU TO DO YOUR WORK, TAKE CARE OF THINGS AT HOME, OR GET ALONG WITH OTHER PEOPLE: VERY DIFFICULT
SUM OF ALL RESPONSES TO PHQ QUESTIONS 1-9: 8
SUM OF ALL RESPONSES TO PHQ QUESTIONS 1-9: 8

## 2024-03-25 NOTE — PROGRESS NOTES
"    Instructions Relayed to Patient by Virtual Roomer:     Patient is active on [employer]:   Relayed following to patient: \"It looks like you are active on Eons, are you able to join the visit this way? If not, do you need us to send you a link now or would you like your provider to send a link via text or email when they are ready to initiate the visit?\"    Reminded patient to ensure they were logged on to virtual visit by arrival time listed. Documented in appointment notes if patient had flexibility to initiate visit sooner than arrival time. If pediatric virtual visit, ensured pediatric patient along with parent/guardian will be present for video visit.     Patient offered the website [email or web address] and/or phone number to [employer] Help line: [phone]      Answers submitted by the patient for this visit:  Patient Health Questionnaire (Submitted on 3/25/2024)  If you checked off any problems, how difficult have these problems made it for you to do your work, take care of things at home, or get along with other people?: Very difficult  PHQ9 TOTAL SCORE: 8  MIKE-7 (Submitted on 3/25/2024)  MIKE 7 TOTAL SCORE: 12  Depression / Anxiety Questionnaire (Submitted on 3/25/2024)  Chief Complaint: Chronic problems general questions HPI Form  Depression/Anxiety: Anxiety  Anxiety only (Submitted on 3/25/2024)  Chief Complaint: Chronic problems general questions HPI Form  Anxiety since last: : worse  Other associated symotome: : Yes  Significant life event: : No, job concerns, grief or loss  Anxious:: Yes  Current substance use:: No  General Questionnaire (Submitted on 3/25/2024)  Chief Complaint: Chronic problems general questions HPI Form  On average, how many sweetened beverages do you drink each day (Examples: soda, juice, sweet tea, etc.  Do NOT count diet or artificially sweetened beverages)?: 0  How many minutes a day do you exercise enough to make your heart beat faster?: 10 to 19  How many days " a week do you exercise enough to make your heart beat faster?: 3 or less  How many days per week do you miss taking your medication?: 0  Jennifer is a 53 year old who is being evaluated via a billable video visit.      Video visit performed in lieu of an in-person visit due to current concerns regarding social distancing and keeping people safe.  Physician and patient agreed this was appropriate for concerns addressed.     How would you like to obtain your AVS? MyChart  If the video visit is dropped, the invitation should be resent by: Send to e-mail at: [email or web address]  Will anyone else be joining your video visit? No      Assessment & Plan       ICD-10-CM    1. Anxiety  F41.9 escitalopram (LEXAPRO) 10 MG tablet     ALPRAZolam (XANAX) 0.5 MG tablet     naloxone (NARCAN) 4 MG/0.1ML nasal spray      2. Persistent insomnia  G47.00 zolpidem (AMBIEN) 10 MG tablet         We talked a bit about medication changes.  She wants to try a different sleeping medication. I am going to give her a trial of zolpidem.  Will start with a lower dose of 5 mg nightly.  I am going to follow-up with her again in about 3 weeks to see if this is working.  In addition we're going to have her stay off the Wellbutrin and start on Lexapro.  We discussed that this will likely not take full effect by the time we talk again but hopefully if she is sleeping better her anxiety might be slightly better by that time as well.  I also agreed to occasional use of Xanax for panic attacks.  We discussed the addiction potential for this medication and I do not want her to be on this long-term but for the short-term until her other medications are more effective and things are better under control for her I think this is reasonable.    Will follow up sooner if things worsen or if the Ambien is not helpful at all, we can then increase the dose to 10 mg.       Nisha Rodriguez is a 53 year old, presenting for the following health  issues:  Anxiety        3/25/2024     9:40 AM   Additional Questions   Roomed by Shakir     History of Present Illness       Mental Health Follow-up:  Patient presents to follow-up on Anxiety.    Patient's anxiety since last visit has been:  Worse  The patient is having other symptoms associated with anxiety.  Any significant life events: No, job concerns and grief or loss  Patient is feeling anxious or having panic attacks.  Patient has no concerns about alcohol or drug use.She consumes 0 sweetened beverage(s) daily.She exercises with enough effort to increase her heart rate 10 to 19 minutes per day.  She exercises with enough effort to increase her heart rate 3 or less days per week.   She is taking medications regularly.     She has not been doing well lately.  She feels her anxiety is out of control.  She had surgery in November and again in December and she still dealing with a lot of pain.  She is currently doing physical therapy, was doing a couple times a week and now is down to once a week.  Her mental health has been declining.  Her anxiety is much worse and she is getting panic attacks.  She is not sleeping well.  She does not feel depressed.  Feels like her depression is under good control.  She is using trazodone 150 mg at night and is not helping her sleep.  She falls asleep okay but she wakes up frequently.  In the past she had increased her Wellbutrin to 300 mg daily but she feels like it made her sleep worse so she dropped back down to 150 mg and then she stopped it and her sleep did get a little bit better.  She was going from 2 to 3 hours per night up to 5 hours per night but is still not adequate.  She has been completely off the Wellbutrin for the past 3 weeks.  She is wondering about trying a different sleep medication.  She has heard of something from a different country called zopymed or something similar, she does not know the exact name of it.      7 pt ROS is otherwise negative except as  noted in HPI.        Objective           Vitals:  No vitals were obtained today due to virtual visit.    Physical Exam   GENERAL: alert and no acute distress. She is pleasant but appears uncomfortable.   EYES: Eyes grossly normal to inspection.  No discharge or erythema, or obvious scleral/conjunctival abnormalities.  RESP: No audible wheeze, cough, or visible cyanosis.    SKIN: Visible skin clear. No significant rash, abnormal pigmentation or lesions.  NEURO: Cranial nerves grossly intact.  Mentation and speech appropriate for age.  PSYCH: Appropriate affect, tone, and pace of words          Video-Visit Details    Type of service:  Video Visit   Originating Location (pt. Location): Home    Distant Location (provider location):  On-site  Platform used for Video Visit: Kristen  Signed Electronically by: Marni Montemayor MD

## 2024-03-26 NOTE — PATIENT INSTRUCTIONS
Jennifer, it was good talking to you today.  I am sorry for what you are going through with your pain and anxiety.      As we discussed, we are going to start you on Lexapro, also known as escitalopram.  Your dose will be 10 mg taken once a day.  I am taking the Wellbutrin off your medication list.    Also are starting you on a new sleep medication called zolpidem, also known as Ambien.  You will take 1 tablet, 5 mg, every night at bedtime.  Please stop taking the trazodone.    My nurse will be calling you to schedule a follow-up in about 3 weeks, to see if this is helpful at all yet.  If not, we can raise the dose a little bit if needed.    I also sent you a small supply of Xanax, also known as alprazolam, to use in case of panic attacks.  This can be an addictive medication, so be careful not to use it unless you need it for panic attacks.  It can be sedating as well.  When you get a prescription for this medication you will also get a prescription for a reversal medication called Narcan.  It is good to have this on hand in case of emergency.    If things are getting worse instead of better or your medications are not helpful and you need to reach me sooner please give me a call.

## 2024-04-01 ENCOUNTER — TELEPHONE (OUTPATIENT)
Dept: FAMILY MEDICINE | Facility: CLINIC | Age: 54
End: 2024-04-01
Payer: COMMERCIAL

## 2024-04-01 DIAGNOSIS — G47.00 PERSISTENT INSOMNIA: Primary | ICD-10-CM

## 2024-04-01 RX ORDER — QUETIAPINE FUMARATE 25 MG/1
25 TABLET, FILM COATED ORAL
Qty: 30 TABLET | Refills: 1 | Status: SHIPPED | OUTPATIENT
Start: 2024-04-01 | End: 2024-04-11

## 2024-04-01 NOTE — TELEPHONE ENCOUNTER
Let her know to stop the Ambien altogether and we will try Seroquel.  I sent in a new prescription.  Marni Montemayor MD

## 2024-04-01 NOTE — TELEPHONE ENCOUNTER
Called and notified patient of the below advice from PCP. She had no further questions or concerns.    VAN MaderaN, RN

## 2024-04-01 NOTE — TELEPHONE ENCOUNTER
Patient called in wanting to send an update to PCP after their recent visit on 3/25/24.     She states she is still having trouble sleeping. She declined triage, she stated she just wants an update sent to her PCP.     She states that she noticed this morning she has been making a mistake. She thought the instructions for zolpidem (AMBIEN) 10 MG tablet was take 1 tablet per night, but realizes now that the directions say take 0.5 tablet.     She states takes this at 9:30-10 PM. She falls asleep within 45 minutes to 1 hour. She states she still wakes up at 1-2 AM every night and cannot get back to sleep.     She also would like to let PCP know that she thinks the escitalopram (LEXAPRO) 10 MG tablet is starting to work.    Faith Osborn, BSN, RN

## 2024-04-02 ENCOUNTER — TELEPHONE (OUTPATIENT)
Dept: FAMILY MEDICINE | Facility: CLINIC | Age: 54
End: 2024-04-02
Payer: COMMERCIAL

## 2024-04-02 DIAGNOSIS — F41.9 ANXIETY: ICD-10-CM

## 2024-04-02 DIAGNOSIS — G47.00 PERSISTENT INSOMNIA: Primary | ICD-10-CM

## 2024-04-02 NOTE — TELEPHONE ENCOUNTER
S: Not sleeping    B: Would like to discuss the current medication regiment- states that they are not helping her sleep. She states she is more anxious.     A: Patient states since her medication changes she still cannot sleep, is becoming more anxious,     R: Please advise    Rosanna Jackson RN on 4/2/2024 at 5:09 PM

## 2024-04-03 ENCOUNTER — MYC MEDICAL ADVICE (OUTPATIENT)
Dept: FAMILY MEDICINE | Facility: CLINIC | Age: 54
End: 2024-04-03
Payer: COMMERCIAL

## 2024-04-03 NOTE — TELEPHONE ENCOUNTER
See mychart note to patient. Don't want her taking both. If necessary, I'll increase her dose of seroquel to 50 but stop the ambien (zolpidem).   Marni Montemayor MD

## 2024-04-03 NOTE — TELEPHONE ENCOUNTER
Patient messaging in about her sleep. Took Seroquel 25mg last night and  Zolpimed 10mg and slept for 6 hours.  She feels good this morning. Patient is wondering if she can continue with this regimen?

## 2024-04-04 ENCOUNTER — NURSE TRIAGE (OUTPATIENT)
Dept: FAMILY MEDICINE | Facility: CLINIC | Age: 54
End: 2024-04-04
Payer: COMMERCIAL

## 2024-04-04 NOTE — TELEPHONE ENCOUNTER
Jennifer [facility] [place] [facility] (supporting Marni Montemayor MD)16 hours ago (6:31 PM)     DB  Thank you for your response. I need to be seen by a doctor as soon as I can as this messaging  and calling every day and waiting is not working for my current state of mind. I previously asked about the sudden termination of the trazadone which I have been on for years as I feel that this sudden stop could be contributing to my worsening state over the last week, I dont know. I got an appt with [facility] and [facility] this Friday to address my insomnia and heightened state of anxiety. This is all I could think of to do to help myself at this time, thank you, Jennifer PALACIOS - I did try the aprazolam a couple of times and this did not help so I have stopped this medication.  Nurse Triage SBAR    Is this a 2nd Level Triage? NO    Situation: Called patient regarding [employer] message. She says that her anxiety has been declining every day since her medication changes after virtual visit with PCP on 3/25/24. She doesn't understand why she was just taken off the trazodone and if this has something to do with her state of anxiety. She has been able to go to work but she will start crying at work sometimes and she has tried the xanax that was prescribed and says this does not help. She feels like her needs aren't being addressed regarding her anxiety and insomnia. She reports feeling safe and no thoughts of suicide. She is seeking out psychiatric care at [facility] tomorrow as she feels like she needs to see someone and get another opinion.    Background: She did trial the 50 mg of seroquel without the ambien last night and said that she did sleep for a little bit longer.    Assessment: Discussed with patient that if her anxiety is getting bad enough there is always the ER that she can seek care. And she has the right to seek other treatment options for her anxiety and insomnia. Did discuss she can call triage  line back or seek ER if worsening anxiety or thoughts of suicide.     Protocol Recommended Disposition:   See in Office Within 3 Days    Recommendation: Patient said she is willing to trial the 50 mg of seroquel without the ambien but is also seeking care at [email or web address] tomorrow. Can patient be worked in any sooner for another virtual visit with you? Her next one isn't until 4/22/24 and she feels this is too far away.      Routed to provider    Does the patient meet one of the following criteria for ADS visit consideration? No    Marycruz Banegas RN on 4/4/2024 at 11:34 AM      Reason for Disposition   MODERATE anxiety (e.g., persistent or frequent anxiety symptoms; interferes with sleep, school, or work)    Additional Information   Negative: SEVERE difficulty breathing (e.g., struggling for each breath, speaks in single words)   Negative: Bluish (or gray) lips or face   Negative: Difficult to awaken or acting confused (e.g., disoriented, slurred speech)   Negative: Hysterical or combative behavior   Negative: Sounds like a life-threatening emergency to the triager   Negative: Chest pain   Negative: Palpitations, skipped heartbeat, or rapid heartbeat is main symptom   Negative: Cough is main symptom   Negative: Suicide thoughts, threats, attempts, or questions   Negative: Depression is main problem or symptom (e.g., feelings of sadness or hopelessness)   Negative: Difficulty breathing and persists > 10 minutes and not relieved by reassurance provided by triager   Negative: Lightheadedness or dizziness and persists > 10 minutes and not relieved by reassurance provided by triager   Negative: SEVERE anxiety (e.g., extremely anxious with intense emotional symptoms such as feeling of unreality, urge to flee, unable to calm down; unable to cope or function), which is not better after 10 minutes of reassurance and Care Advice   Negative: Panic attack symptoms (e.g., sudden onset of intense fear and symptoms such  as dizziness, feeling of impending doom or fear of dying, hyperventilation, numbness or tingling, sweating, trembling), and has not been evaluated for this by doctor (or NP/PA)   Negative: Panic attack symptoms (diagnosed in the past) that is not better with usual treatment, reassurance, or Care Advice   Negative: Alcohol or drug use, known or suspected, and feeling very shaky (i.e., visible tremors of hands)   Negative: Patient sounds very sick or weak to the triager   Negative: Patient sounds very upset or troubled to the triager    Protocols used: Anxiety and Panic Attack-A-OH

## 2024-04-05 ENCOUNTER — MYC MEDICAL ADVICE (OUTPATIENT)
Dept: FAMILY MEDICINE | Facility: CLINIC | Age: 54
End: 2024-04-05
Payer: COMMERCIAL

## 2024-04-05 DIAGNOSIS — G47.00 PERSISTENT INSOMNIA: ICD-10-CM

## 2024-04-05 RX ORDER — ALPRAZOLAM 1 MG
1 TABLET ORAL 3 TIMES DAILY PRN
Qty: 10 TABLET | Refills: 0 | Status: SHIPPED | OUTPATIENT
Start: 2024-04-05 | End: 2024-05-03

## 2024-04-05 NOTE — TELEPHONE ENCOUNTER
Dr. Montemayor reached out to writer to notify patient that phone call with her today will be around the noon hour. [employer] message sent to patient per providers request.     Marycruz Banegas RN on 4/5/2024 at 10:40 AM

## 2024-04-05 NOTE — TELEPHONE ENCOUNTER
I notified her that yes, that may be creating more of a problem. We did that because we were starting a new sleep medication and I didn't want to overdose her.   See carahart note to patient. I'm going to try to call her on Friday.   Marni Montemayor MD

## 2024-04-11 NOTE — TELEPHONE ENCOUNTER
Patient calling to follow up as she had not heard back from PCP or team.     She is doing well on the increased dose of Seroquel, but does need a refill. Pended below    Marsha ARAUJON, RN

## 2024-04-12 RX ORDER — QUETIAPINE FUMARATE 25 MG/1
75 TABLET, FILM COATED ORAL
Qty: 90 TABLET | Refills: 3 | Status: SHIPPED | OUTPATIENT
Start: 2024-04-12 | End: 2024-08-09

## 2024-04-16 DIAGNOSIS — F41.9 ANXIETY: ICD-10-CM

## 2024-04-16 RX ORDER — ESCITALOPRAM OXALATE 10 MG/1
10 TABLET ORAL DAILY
Qty: 90 TABLET | Refills: 0 | Status: SHIPPED | OUTPATIENT
Start: 2024-04-16 | End: 2024-04-22

## 2024-04-22 ENCOUNTER — VIRTUAL VISIT (OUTPATIENT)
Dept: FAMILY MEDICINE | Facility: CLINIC | Age: 54
End: 2024-04-22
Payer: COMMERCIAL

## 2024-04-22 DIAGNOSIS — J30.1 SEASONAL ALLERGIC RHINITIS DUE TO POLLEN: ICD-10-CM

## 2024-04-22 DIAGNOSIS — F41.9 ANXIETY: Primary | ICD-10-CM

## 2024-04-22 DIAGNOSIS — G47.00 PERSISTENT INSOMNIA: ICD-10-CM

## 2024-04-22 DIAGNOSIS — J31.0 RHINOCONJUNCTIVITIS: ICD-10-CM

## 2024-04-22 DIAGNOSIS — H10.9 RHINOCONJUNCTIVITIS: ICD-10-CM

## 2024-04-22 PROCEDURE — 99214 OFFICE O/P EST MOD 30 MIN: CPT | Mod: 95 | Performed by: FAMILY MEDICINE

## 2024-04-22 RX ORDER — CLONAZEPAM 1 MG/1
1 TABLET ORAL
Qty: 30 TABLET | Refills: 1 | Status: SHIPPED | OUTPATIENT
Start: 2024-04-22 | End: 2024-06-12

## 2024-04-22 RX ORDER — AZELASTINE 1 MG/ML
2 SPRAY, METERED NASAL 2 TIMES DAILY
Qty: 30 ML | Refills: 11 | Status: SHIPPED | OUTPATIENT
Start: 2024-04-22

## 2024-04-22 RX ORDER — FLUTICASONE PROPIONATE 50 MCG
2 SPRAY, SUSPENSION (ML) NASAL AT BEDTIME
Qty: 48 G | Refills: 3 | Status: SHIPPED | OUTPATIENT
Start: 2024-04-22

## 2024-04-22 RX ORDER — METHYLPREDNISOLONE 4 MG/1
TABLET ORAL
Qty: 21 TABLET | Refills: 0 | Status: SHIPPED | OUTPATIENT
Start: 2024-04-22 | End: 2024-05-06

## 2024-04-22 RX ORDER — OLOPATADINE HYDROCHLORIDE 1 MG/ML
1 SOLUTION/ DROPS OPHTHALMIC 2 TIMES DAILY
Qty: 5 ML | Refills: 11 | Status: SHIPPED | OUTPATIENT
Start: 2024-04-22

## 2024-04-22 RX ORDER — ESCITALOPRAM OXALATE 20 MG/1
20 TABLET ORAL DAILY
Qty: 30 TABLET | Refills: 2 | Status: SHIPPED | OUTPATIENT
Start: 2024-04-22 | End: 2024-05-17

## 2024-04-22 ASSESSMENT — ANXIETY QUESTIONNAIRES
GAD7 TOTAL SCORE: 11
7. FEELING AFRAID AS IF SOMETHING AWFUL MIGHT HAPPEN: MORE THAN HALF THE DAYS
7. FEELING AFRAID AS IF SOMETHING AWFUL MIGHT HAPPEN: MORE THAN HALF THE DAYS
4. TROUBLE RELAXING: SEVERAL DAYS
1. FEELING NERVOUS, ANXIOUS, OR ON EDGE: NEARLY EVERY DAY
3. WORRYING TOO MUCH ABOUT DIFFERENT THINGS: MORE THAN HALF THE DAYS
GAD7 TOTAL SCORE: 11
8. IF YOU CHECKED OFF ANY PROBLEMS, HOW DIFFICULT HAVE THESE MADE IT FOR YOU TO DO YOUR WORK, TAKE CARE OF THINGS AT HOME, OR GET ALONG WITH OTHER PEOPLE?: SOMEWHAT DIFFICULT
GAD7 TOTAL SCORE: 11
IF YOU CHECKED OFF ANY PROBLEMS ON THIS QUESTIONNAIRE, HOW DIFFICULT HAVE THESE PROBLEMS MADE IT FOR YOU TO DO YOUR WORK, TAKE CARE OF THINGS AT HOME, OR GET ALONG WITH OTHER PEOPLE: SOMEWHAT DIFFICULT
2. NOT BEING ABLE TO STOP OR CONTROL WORRYING: SEVERAL DAYS
6. BECOMING EASILY ANNOYED OR IRRITABLE: SEVERAL DAYS
5. BEING SO RESTLESS THAT IT IS HARD TO SIT STILL: SEVERAL DAYS

## 2024-04-22 ASSESSMENT — ENCOUNTER SYMPTOMS: NERVOUS/ANXIOUS: 1

## 2024-04-22 ASSESSMENT — PATIENT HEALTH QUESTIONNAIRE - PHQ9
SUM OF ALL RESPONSES TO PHQ QUESTIONS 1-9: 5
SUM OF ALL RESPONSES TO PHQ QUESTIONS 1-9: 5
10. IF YOU CHECKED OFF ANY PROBLEMS, HOW DIFFICULT HAVE THESE PROBLEMS MADE IT FOR YOU TO DO YOUR WORK, TAKE CARE OF THINGS AT HOME, OR GET ALONG WITH OTHER PEOPLE: SOMEWHAT DIFFICULT

## 2024-04-22 NOTE — PROGRESS NOTES
"    Instructions Relayed to Patient by Virtual Roomer:     Patient is active on [employer]:   Relayed following to patient: \"It looks like you are active on [employer], are you able to join the visit this way? If not, do you need us to send you a link now or would you like your provider to send a link via text or email when they are ready to initiate the visit?\"    Reminded patient to ensure they were logged on to virtual visit by arrival time listed. Documented in appointment notes if patient had flexibility to initiate visit sooner than arrival time. If pediatric virtual visit, ensured pediatric patient along with parent/guardian will be present for video visit.     Patient offered the website [email or web address] and/or phone number to [employer] Help line: [phone]    Jennifer is a 53 year old who is being evaluated via a billable video visit.      Video visit performed in lieu of an in-person visit due to current concerns regarding social distancing and keeping people safe.  Physician and patient agreed this was appropriate for concerns addressed.     How would you like to obtain your AVS? Localmind  If the video visit is dropped, the invitation should be resent by: Text to cell phone: [phone]  Will anyone else be joining your video visit? No      Assessment & Plan       ICD-10-CM    1. Anxiety  F41.9 escitalopram (LEXAPRO) 20 MG tablet      2. Persistent insomnia  G47.00 clonazePAM (KLONOPIN) 1 MG tablet      3. Seasonal allergic rhinitis due to pollen  J30.1 olopatadine (PATANOL) 0.1 % ophthalmic solution     fluticasone (FLONASE) 50 MCG/ACT nasal spray     azelastine (ASTELIN) 0.1 % nasal spray      4. Rhinoconjunctivitis  J31.0 olopatadine (PATANOL) 0.1 % ophthalmic solution    H10.9 fluticasone (FLONASE) 50 MCG/ACT nasal spray     azelastine (ASTELIN) 0.1 % nasal spray         We discussed medication changes and w'ere going to increase her Lexapro to 20 mg.  In addition I am going to give her " some Klonopin for a starter medication for sleep.  We discussed the risk for overdose with this, the risk for addiction, and the short-term nature of this use.  Advised her not to take Xanax and Klonopin at the same time and I am not refilling her Xanax for now.  She may use this very sparingly during the daytime for anxiety and only use the Klonopin at night.    I did refill her allergy medications as well.  She may use her Flonase and Astelin in addition to her oral Claritin and montelukast.  Also refilled her Patanol eyedrops.  Will put on hold a prescription for Medrol Dosepak in case she needs stepup therapy.  She typically needs this once every year in the spring.    I am going to follow-up with her in 4 days just to see if this Klonopin helps her sleep at all.  We reviewed that the Lexapro change may take a few weeks to notice.    Will keep her Seroquel dose the same, 75 mg at bedtime.    Will follow-up sooner as needed.    Marni Montemayor MD     Nisha Rodriguez is a 53 year old, presenting for the following health issues:  Anxiety and Depression      4/22/2024    11:18 AM   Additional Questions   Roomed by jelani   Accompanied by self     Anxiety    History of Present Illness       Mental Health Follow-up:  Patient presents to follow-up on Depression & Anxiety.Patient's depression since last visit has been:  Medium  The patient is not having other symptoms associated with depression.  Patient's anxiety since last visit has been:  Medium  The patient is not having other symptoms associated with anxiety.  Any significant life events: job concerns, grief or loss and health concerns  Patient is feeling anxious or having panic attacks.  Patient has no concerns about alcohol or drug use.    She eats 2-3 servings of fruits and vegetables daily.She consumes 0 sweetened beverage(s) daily.She exercises with enough effort to increase her heart rate 10 to 19 minutes per day.  She exercises with enough effort to  increase her heart rate 3 or less days per week.   She is taking medications regularly.         7/5/2023     1:33 PM 3/25/2024     8:14 AM 4/22/2024    10:37 AM   PHQ   PHQ-9 Total Score 2 8 5   Q9: Thoughts of better off dead/self-harm past 2 weeks Not at all Not at all Not at all          12/9/2023    11:25 AM 3/25/2024     8:18 AM 4/22/2024    10:38 AM   MIKE-7 SCORE   Total Score 14 (moderate anxiety) 12 (moderate anxiety) 11 (moderate anxiety)   Total Score 14 12 11     Jennifer has been dealing with significant anxiety lately and we have been adjusting her medications.  I recently started her on 10 mg of Lexapro and she finds that is working well, is anticipating that she will need to go up on the dose soon.  Is not having any side effects.  Things are little bit better now that she is sleeping a little bit better.  She is now taking Seroquel 75 mg a night at bedtime.  She still has a hard time falling asleep, was hoping that something would make her feel drowsy before bedtime but nothing has.  Once she does fall asleep she finds she sleeps a little bit longer and can get back to sleep faster.  She still waking up around 1 or 3 AM.  But not staying awake as long.  She has used some Xanax occasionally for daytime anxiety and has used that a few times since I prescribed it for her last.  She has about 3 left.  It works well but does not knock her out.  Overall she is feeling just a little bit better.  She is also requesting treatment for her allergies.  She needs refills on her Patanol eyedrops and her nasal sprays.  She does have montelukast and Claritin as well.  Her allergies are getting really bad right now.  In the past she has needed a Medrol Dosepak about once a year in the spring and would like to have a refill for that on file.    7 pt ROS is otherwise negative except as noted in HPI.        Objective           Vitals:  No vitals were obtained today due to virtual visit.    Physical Exam   GENERAL: alert  and no distress  EYES: Eyes grossly normal to inspection.  No discharge or erythema, or obvious scleral/conjunctival abnormalities.  RESP: No audible wheeze, cough, or visible cyanosis.    SKIN: Visible skin clear. No significant rash, abnormal pigmentation or lesions.  NEURO: Cranial nerves grossly intact.  Mentation and speech appropriate for age.  PSYCH: Appropriate affect, tone, and pace of words        Video-Visit Details    Type of service:  Video Visit   Originating Location (pt. Location): Home    Distant Location (provider location):  On-site  Platform used for Video Visit: Kristen  Signed Electronically by: Marni Montemayor MD

## 2024-04-22 NOTE — PATIENT INSTRUCTIONS
Jennifer, thank you for talking with me early today.  I appreciate your flexibility.  These are the medication changes we agreed on today:    Increase your Lexapro to 20 mg daily.  I sent in a new prescription to the pharmacy.  You can finish up what you have for the 10 mg and then make the switch when you get your new bottle filled.    Please keep your Seroquel dose the same at 75 mg at bedtime.  You should have refills left on this.    In addition I sent in a prescription for something called clonazepam, also known as Klonopin, and you can take 1 mg tablet at bedtime.  This is a benzodiazepine similar to the alprazolam that you are taking for anxiety.  You said you have about 3 left of those and you can still use those during the daytime for anxiety, if needed.  Please do not take the alprazolam and the clonazepam within 6 hours of each other.    I did refill your allergy medications including Flonase, Astelin nasal spray, and Patanol eyedrops.  In addition you can continue your other medications (the montelukast and the Claritin).    I also put a short course of methylprednisolone on hand at the pharmacy in case you get worse. Only use this as needed and remember that steroids can make your sleep worse temporarily.     We will follow-up on Friday at 230 with a virtual visit.    If you need any help sooner please call and speak to the nurse and asked them to speak with me.  Have a wonderful day.

## 2024-05-03 ENCOUNTER — VIRTUAL VISIT (OUTPATIENT)
Dept: FAMILY MEDICINE | Facility: CLINIC | Age: 54
End: 2024-05-03
Payer: COMMERCIAL

## 2024-05-03 DIAGNOSIS — F41.9 ANXIETY: ICD-10-CM

## 2024-05-03 DIAGNOSIS — G47.00 PERSISTENT INSOMNIA: Primary | ICD-10-CM

## 2024-05-03 PROCEDURE — 99441 PR PHYSICIAN TELEPHONE EVALUATION 5-10 MIN: CPT | Performed by: FAMILY MEDICINE

## 2024-05-03 RX ORDER — ALPRAZOLAM 1 MG
1 TABLET ORAL 3 TIMES DAILY PRN
Qty: 10 TABLET | Refills: 0 | Status: SHIPPED | OUTPATIENT
Start: 2024-05-03 | End: 2024-06-12

## 2024-05-03 RX ORDER — OXYCODONE AND ACETAMINOPHEN 5; 325 MG/1; MG/1
1 TABLET ORAL 3 TIMES DAILY
COMMUNITY
Start: 2024-05-04

## 2024-05-03 NOTE — PROGRESS NOTES
Instructions Relayed to Patient by Virtual Roomer:     Patient instructed that provider will initiate telephone visit via phone call      Patient Confirmed they will join visit via: Provider to call patient for telephone visit   Reminded patient to ensure they were logged on to virtual visit by arrival time listed.   Asked if patient has flexibility to initiate visit sooner than arrival time: patient is unable to initiate visit earlier than arrival time     If pediatric virtual visit, ensured pediatric patient along with parent/guardian will be present for video visit.     Patient offered the website [email or web address] and/or phone number to [employer] Help line: [phone]    Jennifer is a 53 year old who is being evaluated via a billable telephone visit.    What phone number would you like to be contacted at? [phone]  How would you like to obtain your AVS? [email or web address]  Originating Location (pt. Location): Home    Distant Location (provider location):  On-site    Assessment & Plan       ICD-10-CM    1. Persistent insomnia  G47.00 ALPRAZolam (XANAX) 1 MG tablet      2. Anxiety  F41.9 ALPRAZolam (XANAX) 1 MG tablet     naloxone (NARCAN) 4 MG/0.1ML nasal spray         This current medication combination seems to be working well. She is getting better sleep and this is positively affecting her anxiety. We'll keep her on this combination for now, give the higher dose of lexapro more time to work and she has follow up planned for June. I am refilling her Xanax for now, just for infrequent use for significant anxiety. Will continue on the Klonopin at night and not use both at the same time. She continues on seroquel as well.         Marni Montemayor MD     Subjective   Jennifer is a 53 year old, presenting for the following health issues:  MH Follow Up        5/3/2024    11:05 AM   Additional Questions   Roomed by Phuc TREVINO     Medication Followup of Escitalopram  Taking Medication as  prescribed: yes  Side Effects:  None  Medication Helping Symptoms:  yes  Anxiety   How are you doing with your anxiety since your last visit? Improved   Are you having other symptoms that might be associated with anxiety? No  Have you had a significant life event? No   Are you feeling depressed? No  Do you have any concerns with your use of alcohol or other drugs? No    Social History     Tobacco Use    Smoking status: Never    Smokeless tobacco: Never   Vaping Use    Vaping status: Never Used   Substance Use Topics    Alcohol use: Yes     Comment: socially    Drug use: No         12/9/2023    11:25 AM 3/25/2024     8:18 AM 4/22/2024    10:38 AM   MIKE-7 SCORE   Total Score 14 (moderate anxiety) 12 (moderate anxiety) 11 (moderate anxiety)   Total Score 14 12 11         7/5/2023     1:33 PM 3/25/2024     8:14 AM 4/22/2024    10:37 AM   PHQ   PHQ-9 Total Score 2 8 5   Q9: Thoughts of better off dead/self-harm past 2 weeks Not at all Not at all Not at all         4/22/2024    10:37 AM   Last PHQ-9   1.  Little interest or pleasure in doing things 1   2.  Feeling down, depressed, or hopeless 1   3.  Trouble falling or staying asleep, or sleeping too much 1   4.  Feeling tired or having little energy 1   5.  Poor appetite or overeating 0   6.  Feeling bad about yourself 0   7.  Trouble concentrating 1   8.  Moving slowly or restless 0   Q9: Thoughts of better off dead/self-harm past 2 weeks 0   PHQ-9 Total Score 5         4/22/2024    10:38 AM   MIKE-7    1. Feeling nervous, anxious, or on edge 3   2. Not being able to stop or control worrying 1   3. Worrying too much about different things 2   4. Trouble relaxing 1   5. Being so restless that it is hard to sit still 1   6. Becoming easily annoyed or irritable 1   7. Feeling afraid, as if something awful might happen 2   MIKE-7 Total Score 11   If you checked any problems, how difficult have they made it for you to do your work, take care of things at home, or get along  "with other people? Somewhat difficult       Medication Followup of Clonazepan  Taking Medication as prescribed: yes  Side Effects:  None  Medication Helping Symptoms:  yes    I recently started her on Klonopin for sleep at night. She has Xanax for use during the day for severe anxiety, which she has used infrequently, about 2 times weekly. She has 1 left from last Rx.   She is sleeping much better. Feels \"ok\". She feels the quality of her sleep is much better, which is really helping her daytime symptoms as well.   I also increased her lexapro recently and she is tolerating that well. No negative side effects. She hasn't noticed it working much yet but it's only been about 2 weeks.       Objective           Vitals:  No vitals were obtained today due to virtual visit.    Physical Exam   General: Alert and no distress //Respiratory: No audible wheeze, cough, or shortness of breath // Psychiatric:  Appropriate affect, tone, and pace of words          Phone call duration: 5 minutes  Signed Electronically by: Marni Montemayor MD    "

## 2024-05-15 DIAGNOSIS — F41.9 ANXIETY: ICD-10-CM

## 2024-05-17 RX ORDER — ESCITALOPRAM OXALATE 20 MG/1
20 TABLET ORAL DAILY
Qty: 90 TABLET | Refills: 0 | Status: SHIPPED | OUTPATIENT
Start: 2024-05-17 | End: 2024-06-12

## 2024-06-10 ASSESSMENT — ANXIETY QUESTIONNAIRES
GAD7 TOTAL SCORE: 13
8. IF YOU CHECKED OFF ANY PROBLEMS, HOW DIFFICULT HAVE THESE MADE IT FOR YOU TO DO YOUR WORK, TAKE CARE OF THINGS AT HOME, OR GET ALONG WITH OTHER PEOPLE?: VERY DIFFICULT
GAD7 TOTAL SCORE: 13
7. FEELING AFRAID AS IF SOMETHING AWFUL MIGHT HAPPEN: MORE THAN HALF THE DAYS

## 2024-06-12 ENCOUNTER — VIRTUAL VISIT (OUTPATIENT)
Dept: FAMILY MEDICINE | Facility: CLINIC | Age: 54
End: 2024-06-12
Attending: FAMILY MEDICINE
Payer: COMMERCIAL

## 2024-06-12 DIAGNOSIS — F41.9 ANXIETY: Primary | ICD-10-CM

## 2024-06-12 DIAGNOSIS — F32.2 MAJOR DEPRESSIVE DISORDER, SINGLE EPISODE, SEVERE WITHOUT PSYCHOTIC FEATURES (H): ICD-10-CM

## 2024-06-12 DIAGNOSIS — G47.00 PERSISTENT INSOMNIA: ICD-10-CM

## 2024-06-12 PROCEDURE — 99214 OFFICE O/P EST MOD 30 MIN: CPT | Mod: 95 | Performed by: FAMILY MEDICINE

## 2024-06-12 RX ORDER — BUSPIRONE HYDROCHLORIDE 5 MG/1
TABLET ORAL
Qty: 90 TABLET | Refills: 1 | Status: SHIPPED | OUTPATIENT
Start: 2024-06-12 | End: 2024-09-03

## 2024-06-12 RX ORDER — ESCITALOPRAM OXALATE 20 MG/1
20 TABLET ORAL DAILY
Qty: 90 TABLET | Refills: 3 | Status: SHIPPED | OUTPATIENT
Start: 2024-06-12

## 2024-06-12 RX ORDER — CLONAZEPAM 1 MG/1
1 TABLET ORAL
Qty: 30 TABLET | Refills: 5 | Status: SHIPPED | OUTPATIENT
Start: 2024-06-12 | End: 2024-06-26

## 2024-06-12 ASSESSMENT — PATIENT HEALTH QUESTIONNAIRE - PHQ9
SUM OF ALL RESPONSES TO PHQ QUESTIONS 1-9: 4
10. IF YOU CHECKED OFF ANY PROBLEMS, HOW DIFFICULT HAVE THESE PROBLEMS MADE IT FOR YOU TO DO YOUR WORK, TAKE CARE OF THINGS AT HOME, OR GET ALONG WITH OTHER PEOPLE: SOMEWHAT DIFFICULT
SUM OF ALL RESPONSES TO PHQ QUESTIONS 1-9: 4

## 2024-06-12 NOTE — PROGRESS NOTES
Jennifer is a 53 year old who is being evaluated via a billable video visit.      Telephone visit performed in lieu of an in-person visit due to current concerns regarding social distancing and keeping people safe.  Physician and patient agreed this was appropriate for concerns addressed.     How would you like to obtain your AVS? MyChart  If the video visit is dropped, the invitation should be resent by: Text to cell phone: [phone]  Will anyone else be joining your video visit? No      Assessment & Plan       ICD-10-CM    1. Anxiety  F41.9 PRIMARY CARE FOLLOW-UP SCHEDULING     escitalopram (LEXAPRO) 20 MG tablet     DISCONTINUED: busPIRone (BUSPAR) 5 MG tablet      2. Major depressive disorder, single episode, severe without psychotic features (H)  F32.2 PRIMARY CARE FOLLOW-UP SCHEDULING      3. Persistent insomnia  G47.00 PRIMARY CARE FOLLOW-UP SCHEDULING     DISCONTINUED: clonazePAM (KLONOPIN) 1 MG tablet         We are going to try adding buspar, see orders. Will plan a follow up visit in about 2-3 months.   Encourage exercise as able and continue working on good habits for stress management, sleep. Will continue her other medications without change. (Lexapro, seroquel, clonazepam). Provided refills.   Continue working with ortho.       Marni Montemayor MD     Subjective   Jennifer is a 53 year old, presenting for the following health issues:   Follow Up        6/12/2024    11:09 AM   Additional Questions   Roomed by Yolanda     History of Present Illness       Mental Health Follow-up:  Patient presents to follow-up on Anxiety.    Patient's anxiety since last visit has been:  Bad  The patient is having other symptoms associated with anxiety.  Any significant life events: No  Patient is feeling anxious or having panic attacks.  Patient has no concerns about alcohol or drug use.    She eats 4 or more servings of fruits and vegetables daily.She consumes 0 sweetened beverage(s) daily.She exercises with  enough effort to increase her heart rate 20 to 29 minutes per day.  She exercises with enough effort to increase her heart rate 5 days per week.   She is taking medications regularly.           Social History     Tobacco Use    Smoking status: Never    Smokeless tobacco: Never   Vaping Use    Vaping status: Never Used   Substance Use Topics    Alcohol use: Yes     Comment: socially    Drug use: No         4/22/2024    10:37 AM 6/12/2024    11:09 AM 9/3/2024    10:47 AM   PHQ   PHQ-9 Total Score 5 4 3   Q9: Thoughts of better off dead/self-harm past 2 weeks Not at all Not at all Not at all         4/22/2024    10:38 AM 6/10/2024     3:50 PM 9/3/2024    10:46 AM   MIKE-7 SCORE   Total Score 11 (moderate anxiety) 13 (moderate anxiety) 11 (moderate anxiety)   Total Score 11 13 11         9/3/2024    10:47 AM   Last PHQ-9   1.  Little interest or pleasure in doing things 1   2.  Feeling down, depressed, or hopeless 0   3.  Trouble falling or staying asleep, or sleeping too much 0   4.  Feeling tired or having little energy 1   5.  Poor appetite or overeating 0   6.  Feeling bad about yourself 0   7.  Trouble concentrating 1   8.  Moving slowly or restless 0   Q9: Thoughts of better off dead/self-harm past 2 weeks 0   PHQ-9 Total Score 3         9/3/2024    10:46 AM   MIKE-7    1. Feeling nervous, anxious, or on edge 2   2. Not being able to stop or control worrying 2   3. Worrying too much about different things 2   4. Trouble relaxing 2   5. Being so restless that it is hard to sit still 1   6. Becoming easily annoyed or irritable 1   7. Feeling afraid, as if something awful might happen 1   MIKE-7 Total Score 11   If you checked any problems, how difficult have they made it for you to do your work, take care of things at home, or get along with other people? Not difficult at all       Suicide Assessment Five-step Evaluation and Treatment (SAFE-T)    Her anxiety is not well controlled. She is dealing with a lot of stress  "at work, layoffs, increased workload, changes in teams and poor communication.   She feels fairly secure about her position but still adds a lot of stress. She \"lives in fear\".     She is sleeping well, getting 9 hours but wakes at 1 am.   She is walking outside in the garden.       7 pt ROS is otherwise negative except as noted in HPI.        Objective           Vitals:  No vitals were obtained today due to virtual visit.    Physical Exam   GENERAL: alert and no distress  EYES: Eyes grossly normal to inspection.  No discharge or erythema, or obvious scleral/conjunctival abnormalities.  RESP: No audible wheeze, cough, or visible cyanosis.    SKIN: Visible skin clear. No significant rash, abnormal pigmentation or lesions.  NEURO: Cranial nerves grossly intact.  Mentation and speech appropriate for age.  PSYCH: Appropriate affect, tone, and pace of words        Video-Visit Details    Type of service:  Video Visit   Originating Location (pt. Location): Home    Distant Location (provider location):  On-site  Platform used for Video Visit: Kristen  Signed Electronically by: Marni Montemayor MD    "

## 2024-06-21 ENCOUNTER — TELEPHONE (OUTPATIENT)
Dept: FAMILY MEDICINE | Facility: CLINIC | Age: 54
End: 2024-06-21
Payer: COMMERCIAL

## 2024-06-26 DIAGNOSIS — G47.00 PERSISTENT INSOMNIA: ICD-10-CM

## 2024-06-26 RX ORDER — CLONAZEPAM 1 MG/1
1 TABLET ORAL
Qty: 30 TABLET | Refills: 5 | Status: SHIPPED | OUTPATIENT
Start: 2024-06-26 | End: 2024-07-30

## 2024-07-24 ENCOUNTER — PATIENT OUTREACH (OUTPATIENT)
Dept: CARE COORDINATION | Facility: CLINIC | Age: 54
End: 2024-07-24
Payer: COMMERCIAL

## 2024-07-29 ENCOUNTER — MYC MEDICAL ADVICE (OUTPATIENT)
Dept: FAMILY MEDICINE | Facility: CLINIC | Age: 54
End: 2024-07-29
Payer: COMMERCIAL

## 2024-07-29 DIAGNOSIS — G47.00 PERSISTENT INSOMNIA: ICD-10-CM

## 2024-07-30 RX ORDER — CLONAZEPAM 1 MG/1
1 TABLET ORAL
Qty: 30 TABLET | Refills: 4 | Status: SHIPPED | OUTPATIENT
Start: 2024-07-30

## 2024-07-30 NOTE — TELEPHONE ENCOUNTER
Clonazepam: Change Pharmacy to [facility] vs. [facility] as they do not have medication in stock.     RN changed pharmacy per patient request and routed to provider for review and approval/denial.     Rosanna Jackson RN on 7/30/2024 at 9:05 AM

## 2024-07-31 NOTE — TELEPHONE ENCOUNTER
[facility] calling to verify that the Rx was approved by the Doctor because it was originally sent to another pharmacy.  Told the pharmacy per the notes in chart that the Doctor approved the Rx because the other Pharmacy was out of stock.

## 2024-08-08 ENCOUNTER — MYC MEDICAL ADVICE (OUTPATIENT)
Dept: FAMILY MEDICINE | Facility: CLINIC | Age: 54
End: 2024-08-08
Payer: COMMERCIAL

## 2024-08-09 DIAGNOSIS — G47.00 PERSISTENT INSOMNIA: ICD-10-CM

## 2024-08-09 RX ORDER — QUETIAPINE FUMARATE 25 MG/1
75 TABLET, FILM COATED ORAL
Qty: 270 TABLET | Refills: 0 | Status: SHIPPED | OUTPATIENT
Start: 2024-08-09 | End: 2024-09-05

## 2024-09-03 ENCOUNTER — VIRTUAL VISIT (OUTPATIENT)
Dept: FAMILY MEDICINE | Facility: CLINIC | Age: 54
End: 2024-09-03
Payer: COMMERCIAL

## 2024-09-03 DIAGNOSIS — H10.9 RHINOCONJUNCTIVITIS: ICD-10-CM

## 2024-09-03 DIAGNOSIS — Z12.31 VISIT FOR SCREENING MAMMOGRAM: Primary | ICD-10-CM

## 2024-09-03 DIAGNOSIS — F41.9 ANXIETY: Primary | ICD-10-CM

## 2024-09-03 DIAGNOSIS — Z12.31 VISIT FOR SCREENING MAMMOGRAM: ICD-10-CM

## 2024-09-03 DIAGNOSIS — J31.0 RHINOCONJUNCTIVITIS: ICD-10-CM

## 2024-09-03 DIAGNOSIS — G47.00 PERSISTENT INSOMNIA: ICD-10-CM

## 2024-09-03 PROCEDURE — 99213 OFFICE O/P EST LOW 20 MIN: CPT | Mod: 95 | Performed by: FAMILY MEDICINE

## 2024-09-03 RX ORDER — MONTELUKAST SODIUM 10 MG/1
1 TABLET ORAL AT BEDTIME
Qty: 90 TABLET | Refills: 3 | Status: SHIPPED | OUTPATIENT
Start: 2024-09-03

## 2024-09-03 ASSESSMENT — ANXIETY QUESTIONNAIRES
4. TROUBLE RELAXING: MORE THAN HALF THE DAYS
6. BECOMING EASILY ANNOYED OR IRRITABLE: SEVERAL DAYS
IF YOU CHECKED OFF ANY PROBLEMS ON THIS QUESTIONNAIRE, HOW DIFFICULT HAVE THESE PROBLEMS MADE IT FOR YOU TO DO YOUR WORK, TAKE CARE OF THINGS AT HOME, OR GET ALONG WITH OTHER PEOPLE: NOT DIFFICULT AT ALL
7. FEELING AFRAID AS IF SOMETHING AWFUL MIGHT HAPPEN: SEVERAL DAYS
2. NOT BEING ABLE TO STOP OR CONTROL WORRYING: MORE THAN HALF THE DAYS
GAD7 TOTAL SCORE: 11
8. IF YOU CHECKED OFF ANY PROBLEMS, HOW DIFFICULT HAVE THESE MADE IT FOR YOU TO DO YOUR WORK, TAKE CARE OF THINGS AT HOME, OR GET ALONG WITH OTHER PEOPLE?: NOT DIFFICULT AT ALL
1. FEELING NERVOUS, ANXIOUS, OR ON EDGE: MORE THAN HALF THE DAYS
GAD7 TOTAL SCORE: 11
3. WORRYING TOO MUCH ABOUT DIFFERENT THINGS: MORE THAN HALF THE DAYS
5. BEING SO RESTLESS THAT IT IS HARD TO SIT STILL: SEVERAL DAYS

## 2024-09-03 ASSESSMENT — PATIENT HEALTH QUESTIONNAIRE - PHQ9
SUM OF ALL RESPONSES TO PHQ QUESTIONS 1-9: 3
SUM OF ALL RESPONSES TO PHQ QUESTIONS 1-9: 3
10. IF YOU CHECKED OFF ANY PROBLEMS, HOW DIFFICULT HAVE THESE PROBLEMS MADE IT FOR YOU TO DO YOUR WORK, TAKE CARE OF THINGS AT HOME, OR GET ALONG WITH OTHER PEOPLE: NOT DIFFICULT AT ALL

## 2024-09-03 NOTE — PROGRESS NOTES
Subjective    Reports having her daughter and boyfriend staying with her. She has been experiencing inconsistent sleep patterns and struggles with anxiety. Previous attempts to treat anxiety with Xanax were not successful. She also tried Buspirone, but it made her feel more tired than she could tolerate. She has been feeling tired and has started exercising more frequently in the past six days to boost her energy. She is also trying to maintain a healthy diet. She recently underwent a knee procedure, but the pain has returned after the back of her knee swelled up again. She also has hip pain, but her request for MRIs was declined.      Objective    No objective information detected during this encounter      Assessment    - Anxiety disorder: Patient continues to struggle with anxiety. Previous treatments with Xanax and Buspirone were not successful due to side effects and lack of efficacy.    - Sleep disorder: Patient reports inconsistent sleep patterns.    - Chronic knee pain: Patient underwent a knee procedure, but the pain has returned due to swelling at the back of the knee.    - Hip pain: Patient reports hip pain, but MRI was declined.      Plan    - Continue to monitor anxiety and sleep disorder. Consider alternative treatments for anxiety that may have less sedating side effects.    - Encourage regular exercise and a healthy diet to boost energy levels.    - Re-evaluate knee pain and consider additional interventions if necessary.    - Address hip pain and explore options for imaging or other diagnostic tests.

## 2024-09-03 NOTE — PROGRESS NOTES
Jennifer is a 54 year old who is being evaluated via a billable video visit.    How would you like to obtain your AVS? MyChart  If the video visit is dropped, the invitation should be resent by: Text to cell phone: [phone]  Will anyone else be joining your video visit? No  {If patient encounters technical issues they should call [phone] :[id number]}    Assessment & Plan       ICD-10-CM    1. Visit for screening mammogram  Z12.31 MA Screening Bilateral w/ Denilson      2. Rhinoconjunctivitis  J31.0 montelukast (SINGULAIR) 10 MG tablet    H10.9            Subjective    Reports having her daughter and boyfriend staying with her. She has been experiencing inconsistent sleep patterns and struggles with anxiety. Previous attempts to treat anxiety with Xanax were not successful. She also tried Buspirone, but it made her feel more tired than she could tolerate. She has been feeling tired and has started exercising more frequently in the past six days to boost her energy. She is also trying to maintain a healthy diet. She recently underwent a knee procedure, but the pain has returned after the back of her knee swelled up again. She also has hip pain, but her request for MRIs was declined.      Objective    No objective information detected during this encounter      Assessment    - Anxiety disorder: Patient continues to struggle with anxiety. Previous treatments with Xanax and Buspirone were not successful due to side effects and lack of efficacy.    - Sleep disorder: Patient reports inconsistent sleep patterns.    - Chronic knee pain: Patient underwent a knee procedure, but the pain has returned due to swelling at the back of the knee.    - Hip pain: Patient reports hip pain, but MRI was declined.      Plan    - Continue to monitor anxiety and sleep disorder. Consider alternative treatments for anxiety that may have less sedating side effects.    - Encourage regular exercise and a healthy diet to boost energy levels.    -  "Re-evaluate knee pain and consider additional interventions if necessary.    - Address hip pain and explore options for imaging or other diagnostic tests.          Will plan routine followup in 3 months. I offered follow up sooner but she feels she doesn't need to be seen sooner. Will continue with her PT and work toward getting back into more regular exercise. Will stay off the buspar since it didn't help.   Continue meds without change.   I refilled her singulair for prn.   She is expecting to decrease her pain pills back down at next visit.   Will work on getting approval for hip MRI in future if ***    Marni Montemayor MD     Nisha Rodriguez is a 54 year old, presenting for the following health issues:   Follow Up      9/3/2024    10:43 AM   Additional Questions   Roomed by Gloria REYNOLDS     History of Present Illness       Mental Health Follow-up:  Patient presents to follow-up on Anxiety.    Patient's anxiety since last visit has been:  Medium  The patient is having other symptoms associated with anxiety.  Any significant life events: No  Patient is feeling anxious or having panic attacks.  Patient has no concerns about alcohol or drug use.        {SUPERLIST (Optional):511397}  {additonal problems for provider to add (Optional):224354}    {ROS Picklists (Optional):825559}      Objective           Vitals:  No vitals were obtained today due to virtual visit.    Physical Exam   {video visit exam brief selected:095615}    {Diagnostic Test Results (Optional):055423}      Video-Visit Details    Type of service:  Video Visit   Originating Location (pt. Location): {video visit patient location:670511::\"Home\"}  {PROVIDER LOCATION On-site should be selected for visits conducted from your clinic location or adjoining [facility] hospital, academic office, or other nearby [facility] building. Off-site should be selected for all other provider locations, including home:267592}  Distant Location (provider location):  {virtual " "location provider:590814}  Platform used for Video Visit: {Virtual Visit Platforms:335296::\"TapMyBack\"}  Signed Electronically by: Marni Montemayor MD  {Email feedback regarding this note to [email or web address]   :964056}  "

## 2024-09-04 DIAGNOSIS — G47.00 PERSISTENT INSOMNIA: ICD-10-CM

## 2024-09-04 RX ORDER — QUETIAPINE FUMARATE 25 MG/1
75 TABLET, FILM COATED ORAL
Qty: 270 TABLET | Refills: 0 | Status: CANCELLED | OUTPATIENT
Start: 2024-09-04

## 2024-09-04 NOTE — TELEPHONE ENCOUNTER
Requested Prescriptions   Pending Prescriptions Disp Refills    QUEtiapine (SEROQUEL) 25 MG tablet 270 tablet 0     Sig: Take 3 tablets (75 mg) by mouth nightly as needed (sleep).       There is no refill protocol information for this order        Pharmacy change, please refill.    Carli Phillips, [facility]

## 2024-09-05 RX ORDER — QUETIAPINE FUMARATE 25 MG/1
75 TABLET, FILM COATED ORAL
Qty: 270 TABLET | Refills: 3 | Status: SHIPPED | OUTPATIENT
Start: 2024-09-05

## 2024-09-06 DIAGNOSIS — G47.00 PERSISTENT INSOMNIA: ICD-10-CM

## 2024-09-06 RX ORDER — QUETIAPINE FUMARATE 25 MG/1
75 TABLET, FILM COATED ORAL
Qty: 270 TABLET | Refills: 3 | OUTPATIENT
Start: 2024-09-06

## 2024-10-25 ENCOUNTER — MYC MEDICAL ADVICE (OUTPATIENT)
Dept: FAMILY MEDICINE | Facility: CLINIC | Age: 54
End: 2024-10-25
Payer: COMMERCIAL

## 2024-10-25 NOTE — TELEPHONE ENCOUNTER
Last refill 09/05/2024 for Q 270 tab and 3 refills sent to [facility] in [place].     Sent [employer] message to patient.   Rosanna Jackson RN on 10/25/2024 at 12:01 PM

## 2025-03-14 DIAGNOSIS — J30.1 SEASONAL ALLERGIC RHINITIS DUE TO POLLEN: ICD-10-CM

## 2025-03-14 DIAGNOSIS — J31.0 RHINOCONJUNCTIVITIS: ICD-10-CM

## 2025-03-14 DIAGNOSIS — H10.9 RHINOCONJUNCTIVITIS: ICD-10-CM

## 2025-03-14 RX ORDER — FLUTICASONE PROPIONATE 50 MCG
2 SPRAY, SUSPENSION (ML) NASAL DAILY
Qty: 48 G | Refills: 0 | Status: SHIPPED | OUTPATIENT
Start: 2025-03-14

## 2025-03-14 NOTE — TELEPHONE ENCOUNTER
Medication refilled x1.  Needs to follow-up with new primary care provider for further refills.  Will have staff notify patient.    Dank Lock MD

## 2025-03-17 ENCOUNTER — MYC MEDICAL ADVICE (OUTPATIENT)
Dept: FAMILY MEDICINE | Facility: CLINIC | Age: 55
End: 2025-03-17
Payer: COMMERCIAL

## 2025-03-17 NOTE — TELEPHONE ENCOUNTER
Patient has been notified of the note below via [employer]. Reminder set for 3 days to send letter if not read

## 2025-03-17 NOTE — LETTER
March 20, 2025      Jennifer Reyes  [address] [address] [place] [id number]        Dear Jennifer,     We are concerned about your health care.  We recently provided you with a medication refill.  Many medications require routine follow-up with your Doctor.       At this time we ask that: You schedule a office visit with a new physician to follow your medications.  Call the clinic at [phone] to schedule.      Your prescription:  Has been filled. Please schedule a follow up visit for first available appointment. Courtesy refills will be given if needed until your scheduled appointment.         Thank you   [facility] Care Team  [phone]

## 2025-04-19 ENCOUNTER — HEALTH MAINTENANCE LETTER (OUTPATIENT)
Age: 55
End: 2025-04-19

## 2025-07-29 ENCOUNTER — TELEPHONE (OUTPATIENT)
Dept: FAMILY MEDICINE | Facility: CLINIC | Age: 55
End: 2025-07-29
Payer: COMMERCIAL

## 2025-07-29 NOTE — TELEPHONE ENCOUNTER
Patient Quality Outreach    Patient is due for the following:   Breast Cancer Screening - Mammogram  Depression  -  PHQ-9 needed  Physical Preventive Adult Physical      Topic Date Due    Hepatitis B Vaccine (1 of 3 - 19+ 3-dose series) Never done    Pneumococcal Vaccine (1 of 1 - PCV) Never done    Zoster (Shingles) Vaccine (1 of 2) Never done    COVID-19 Vaccine (3 - 2024-25 season) 09/01/2024       Action(s) Taken:   Schedule a Adult Preventative    Type of outreach:    Sent [employer] message.    Questions for provider review:    None         Krystal Hunter CNA  Chart routed to None.

## 2025-08-23 ENCOUNTER — HEALTH MAINTENANCE LETTER (OUTPATIENT)
Age: 55
End: 2025-08-23

## (undated) DEVICE — DRAPE C-ARM 60X42" 1013

## (undated) DEVICE — ESU GROUND PAD UNIVERSAL W/O CORD

## (undated) DEVICE — MANIFOLD NEPTUNE 4 PORT 700-20

## (undated) DEVICE — GOWN XXL 9575

## (undated) DEVICE — NDL BLUNT 19GA 1.5"

## (undated) DEVICE — SU VICRYL 2-0 CT-2 CR 8X18" J726D

## (undated) DEVICE — SYR BULB IRRIG DOVER 60 ML LATEX FREE 67000

## (undated) DEVICE — ESU ELEC BLADE 2.75" COATED/INSULATED E1455

## (undated) DEVICE — PACK SPINE SM CUSTOM SNE15SSFSK

## (undated) DEVICE — TOOL DISSECT MIDAS MR8 12CM TELESC MATCH 2.5 MR8-T12MH25

## (undated) DEVICE — DRAPE SHEET REV FOLD 3/4 9349

## (undated) DEVICE — GLOVE PROTEXIS BLUE W/NEU-THERA 8.5  2D73EB85

## (undated) DEVICE — SU VICRYL 0 CT-1 CR 8X18" J740D

## (undated) DEVICE — NDL SPINAL 18GA 3.5" 405184

## (undated) DEVICE — LINEN TOWEL PACK X5 5464

## (undated) DEVICE — NDL BLUNT 17GA 1.5" 8881202330

## (undated) DEVICE — SU VICRYL 0 UR-6 27" J603H

## (undated) DEVICE — DRAPE MICROSCOPE LEICA 54X150" AR8033650

## (undated) DEVICE — ESU ELEC BLADE 6" COATED/INSULATED E1455-6

## (undated) DEVICE — DRAPE MAYO STAND 23X54 8337

## (undated) DEVICE — PREP CHLORAPREP 26ML TINTED HI-LITE ORANGE 930815

## (undated) DEVICE — ESU PENCIL W/HOLSTER E2350H

## (undated) DEVICE — POSITIONER PT PRONESAFE HEAD REST W/DERMAPROX INSERT 40599

## (undated) DEVICE — RX SURGIFLO HEMOSTATIC MATRIX W/THROMBIN 8ML 2994

## (undated) DEVICE — SPONGE SURGIFOAM 50

## (undated) DEVICE — GLOVE PROTEXIS MICRO 7.5  2D73PM75

## (undated) RX ORDER — FENTANYL CITRATE 50 UG/ML
INJECTION, SOLUTION INTRAMUSCULAR; INTRAVENOUS
Status: DISPENSED
Start: 2022-10-05

## (undated) RX ORDER — HYDROMORPHONE HCL IN WATER/PF 6 MG/30 ML
PATIENT CONTROLLED ANALGESIA SYRINGE INTRAVENOUS
Status: DISPENSED
Start: 2022-10-05

## (undated) RX ORDER — CLINDAMYCIN PHOSPHATE 900 MG/50ML
INJECTION, SOLUTION INTRAVENOUS
Status: DISPENSED
Start: 2022-10-05

## (undated) RX ORDER — HYDROMORPHONE HYDROCHLORIDE 1 MG/ML
INJECTION, SOLUTION INTRAMUSCULAR; INTRAVENOUS; SUBCUTANEOUS
Status: DISPENSED
Start: 2022-10-05

## (undated) RX ORDER — FENTANYL CITRATE 0.05 MG/ML
INJECTION, SOLUTION INTRAMUSCULAR; INTRAVENOUS
Status: DISPENSED
Start: 2022-10-05

## (undated) RX ORDER — BUPIVACAINE HYDROCHLORIDE AND EPINEPHRINE 2.5; 5 MG/ML; UG/ML
INJECTION, SOLUTION EPIDURAL; INFILTRATION; INTRACAUDAL; PERINEURAL
Status: DISPENSED
Start: 2022-10-05

## (undated) RX ORDER — ONDANSETRON 2 MG/ML
INJECTION INTRAMUSCULAR; INTRAVENOUS
Status: DISPENSED
Start: 2022-10-05

## (undated) RX ORDER — LIDOCAINE HYDROCHLORIDE 20 MG/ML
INJECTION, SOLUTION EPIDURAL; INFILTRATION; INTRACAUDAL; PERINEURAL
Status: DISPENSED
Start: 2022-10-05

## (undated) RX ORDER — GLYCOPYRROLATE 0.2 MG/ML
INJECTION, SOLUTION INTRAMUSCULAR; INTRAVENOUS
Status: DISPENSED
Start: 2022-10-05

## (undated) RX ORDER — NEOSTIGMINE METHYLSULFATE 1 MG/ML
VIAL (ML) INJECTION
Status: DISPENSED
Start: 2022-10-05